# Patient Record
Sex: FEMALE | HISPANIC OR LATINO | Employment: FULL TIME | URBAN - METROPOLITAN AREA
[De-identification: names, ages, dates, MRNs, and addresses within clinical notes are randomized per-mention and may not be internally consistent; named-entity substitution may affect disease eponyms.]

---

## 2017-01-13 DIAGNOSIS — N91.1 SECONDARY AMENORRHEA: ICD-10-CM

## 2017-01-17 ENCOUNTER — HOSPITAL ENCOUNTER (OUTPATIENT)
Dept: LAB | Facility: MEDICAL CENTER | Age: 32
End: 2017-01-17
Attending: FAMILY MEDICINE
Payer: COMMERCIAL

## 2017-01-17 DIAGNOSIS — N91.1 SECONDARY AMENORRHEA: ICD-10-CM

## 2017-01-17 LAB
PROLACTIN SERPL-MCNC: 8.41 NG/ML (ref 2.8–26)
T4 FREE SERPL-MCNC: 0.65 NG/DL (ref 0.53–1.43)
TSH SERPL DL<=0.005 MIU/L-ACNC: 1.24 UIU/ML (ref 0.3–3.7)

## 2017-01-17 PROCEDURE — 36415 COLL VENOUS BLD VENIPUNCTURE: CPT

## 2017-01-17 PROCEDURE — 84146 ASSAY OF PROLACTIN: CPT

## 2017-01-17 PROCEDURE — 84443 ASSAY THYROID STIM HORMONE: CPT

## 2017-01-17 PROCEDURE — 84439 ASSAY OF FREE THYROXINE: CPT

## 2017-01-19 ENCOUNTER — PATIENT MESSAGE (OUTPATIENT)
Dept: MEDICAL GROUP | Age: 32
End: 2017-01-19

## 2017-01-27 ENCOUNTER — HOSPITAL ENCOUNTER (OUTPATIENT)
Dept: LAB | Facility: MEDICAL CENTER | Age: 32
End: 2017-01-27
Attending: CLINICAL NURSE SPECIALIST
Payer: COMMERCIAL

## 2017-01-27 LAB
25(OH)D3 SERPL-MCNC: 66 NG/ML (ref 30–100)
ESTRADIOL SERPL-MCNC: 27 PG/ML
FSH SERPL-ACNC: 4.5 MIU/ML
PROLACTIN SERPL-MCNC: 7.19 NG/ML (ref 2.8–26)

## 2017-01-27 PROCEDURE — 84146 ASSAY OF PROLACTIN: CPT

## 2017-01-27 PROCEDURE — 36415 COLL VENOUS BLD VENIPUNCTURE: CPT

## 2017-01-27 PROCEDURE — 84270 ASSAY OF SEX HORMONE GLOBUL: CPT

## 2017-01-27 PROCEDURE — 84403 ASSAY OF TOTAL TESTOSTERONE: CPT

## 2017-01-27 PROCEDURE — 82670 ASSAY OF TOTAL ESTRADIOL: CPT

## 2017-01-27 PROCEDURE — 82306 VITAMIN D 25 HYDROXY: CPT

## 2017-01-27 PROCEDURE — 83001 ASSAY OF GONADOTROPIN (FSH): CPT

## 2017-02-01 LAB
SHBG SERPL-SCNC: 147 NMOL/L (ref 30–135)
TESTOST FREE SERPL-MCNC: 1.6 PG/ML (ref 0.8–7.4)
TESTOST SERPL-MCNC: 27 NG/DL (ref 9–55)

## 2017-02-09 ENCOUNTER — HOSPITAL ENCOUNTER (OUTPATIENT)
Dept: LAB | Facility: MEDICAL CENTER | Age: 32
End: 2017-02-09
Attending: PHYSICIAN ASSISTANT
Payer: COMMERCIAL

## 2017-02-09 ENCOUNTER — HOSPITAL ENCOUNTER (OUTPATIENT)
Dept: LAB | Facility: MEDICAL CENTER | Age: 32
End: 2017-02-09
Payer: COMMERCIAL

## 2017-02-09 LAB
25(OH)D3 SERPL-MCNC: 90 NG/ML (ref 30–100)
ALBUMIN SERPL BCP-MCNC: 5.1 G/DL (ref 3.2–4.9)
ALBUMIN/GLOB SERPL: 1.5 G/DL
ALP SERPL-CCNC: 83 U/L (ref 30–99)
ALT SERPL-CCNC: 25 U/L (ref 2–50)
ANION GAP SERPL CALC-SCNC: 7 MMOL/L (ref 0–11.9)
APTT PPP: 27 SEC (ref 24.7–36)
AST SERPL-CCNC: 23 U/L (ref 12–45)
BASOPHILS # BLD AUTO: 0.02 K/UL (ref 0–0.12)
BASOPHILS NFR BLD AUTO: 0.4 % (ref 0–1.8)
BDY FAT % MEASURED: 15.7 %
BILIRUB SERPL-MCNC: 0.9 MG/DL (ref 0.1–1.5)
BP DIAS: 62 MMHG
BP SYS: 97 MMHG
BUN SERPL-MCNC: 12 MG/DL (ref 8–22)
CALCIUM SERPL-MCNC: 10.6 MG/DL (ref 8.5–10.5)
CHLORIDE SERPL-SCNC: 103 MMOL/L (ref 96–112)
CHOLEST SERPL-MCNC: 178 MG/DL (ref 100–199)
CHOLEST SERPL-MCNC: 181 MG/DL (ref 100–199)
CO2 SERPL-SCNC: 29 MMOL/L (ref 20–33)
CREAT SERPL-MCNC: 0.82 MG/DL (ref 0.5–1.4)
DIABETES HTDIA: NO
EOSINOPHIL # BLD: 0.01 K/UL (ref 0–0.51)
EOSINOPHIL NFR BLD AUTO: 0.2 % (ref 0–6.9)
ERYTHROCYTE [DISTWIDTH] IN BLOOD BY AUTOMATED COUNT: 45.6 FL (ref 35.9–50)
EVENT NAME HTEVT: NORMAL
FASTING HTFAS: YES
FOLATE SERPL-MCNC: >23.6 NG/ML
GLOBULIN SER CALC-MCNC: 3.4 G/DL (ref 1.9–3.5)
GLUCOSE SERPL-MCNC: 70 MG/DL (ref 65–99)
GLUCOSE SERPL-MCNC: 83 MG/DL (ref 65–99)
HCT VFR BLD AUTO: 44.5 % (ref 37–47)
HDLC SERPL-MCNC: 86 MG/DL
HDLC SERPL-MCNC: 89 MG/DL
HGB BLD-MCNC: 15.4 G/DL (ref 12–16)
HYPERTENSION HTHYP: NO
IMM GRANULOCYTES # BLD AUTO: 0.01 K/UL (ref 0–0.11)
IMM GRANULOCYTES NFR BLD AUTO: 0.2 % (ref 0–0.9)
INR PPP: 0.99 (ref 0.87–1.13)
LDLC SERPL CALC-MCNC: 77 MG/DL
LDLC SERPL CALC-MCNC: 77 MG/DL
LYMPHOCYTES # BLD: 1.63 K/UL (ref 1–4.8)
LYMPHOCYTES NFR BLD AUTO: 35.6 % (ref 22–41)
MCH RBC QN AUTO: 34 PG (ref 27–33)
MCHC RBC AUTO-ENTMCNC: 34.6 G/DL (ref 33.6–35)
MCV RBC AUTO: 98.2 FL (ref 81.4–97.8)
MONOCYTES # BLD: 0.28 K/UL (ref 0–0.85)
MONOCYTES NFR BLD AUTO: 6.1 % (ref 0–13.4)
NEUTROPHILS # BLD: 2.63 K/UL (ref 2–7.15)
NEUTROPHILS NFR BLD AUTO: 57.5 % (ref 44–72)
NRBC # BLD AUTO: 0 K/UL
NRBC BLD-RTO: 0 /100 WBC
PLATELET # BLD AUTO: 207 K/UL (ref 164–446)
PMV BLD AUTO: 11.6 FL (ref 9–12.9)
POTASSIUM SERPL-SCNC: 3.7 MMOL/L (ref 3.6–5.5)
PROT SERPL-MCNC: 8.5 G/DL (ref 6–8.2)
PROTHROMBIN TIME: 13.4 SEC (ref 12–14.6)
RBC # BLD AUTO: 4.53 M/UL (ref 4.2–5.4)
SCREENING LOC CITY HTCIT: NORMAL
SCREENING LOC STATE HTSTA: NORMAL
SCREENING LOCATION HTLOC: NORMAL
SMOKING HTSMO: NO
SODIUM SERPL-SCNC: 139 MMOL/L (ref 135–145)
SUBSCRIBER ID HTSID: NORMAL
TRIGL SERPL-MCNC: 73 MG/DL (ref 0–149)
TRIGL SERPL-MCNC: 74 MG/DL (ref 0–149)
VIT B12 SERPL-MCNC: 881 PG/ML (ref 211–911)
WBC # BLD AUTO: 4.6 K/UL (ref 4.8–10.8)

## 2017-02-09 PROCEDURE — 85025 COMPLETE CBC W/AUTO DIFF WBC: CPT

## 2017-02-09 PROCEDURE — 80061 LIPID PANEL: CPT

## 2017-02-09 PROCEDURE — 82746 ASSAY OF FOLIC ACID SERUM: CPT

## 2017-02-09 PROCEDURE — 85610 PROTHROMBIN TIME: CPT

## 2017-02-09 PROCEDURE — 82607 VITAMIN B-12: CPT

## 2017-02-09 PROCEDURE — 80053 COMPREHEN METABOLIC PANEL: CPT

## 2017-02-09 PROCEDURE — 82306 VITAMIN D 25 HYDROXY: CPT

## 2017-02-09 PROCEDURE — 85730 THROMBOPLASTIN TIME PARTIAL: CPT

## 2017-02-09 PROCEDURE — 36415 COLL VENOUS BLD VENIPUNCTURE: CPT

## 2017-03-24 ENCOUNTER — HOSPITAL ENCOUNTER (OUTPATIENT)
Dept: LAB | Facility: MEDICAL CENTER | Age: 32
End: 2017-03-24
Attending: PHYSICIAN ASSISTANT
Payer: COMMERCIAL

## 2017-03-24 LAB
ALBUMIN SERPL BCP-MCNC: 4.5 G/DL (ref 3.2–4.9)
ALBUMIN/GLOB SERPL: 1.3 G/DL
ALP SERPL-CCNC: 76 U/L (ref 30–99)
ALT SERPL-CCNC: 31 U/L (ref 2–50)
ANION GAP SERPL CALC-SCNC: 6 MMOL/L (ref 0–11.9)
AST SERPL-CCNC: 23 U/L (ref 12–45)
BILIRUB SERPL-MCNC: 0.5 MG/DL (ref 0.1–1.5)
BUN SERPL-MCNC: 14 MG/DL (ref 8–22)
CALCIUM SERPL-MCNC: 9.9 MG/DL (ref 8.5–10.5)
CHLORIDE SERPL-SCNC: 102 MMOL/L (ref 96–112)
CO2 SERPL-SCNC: 28 MMOL/L (ref 20–33)
CREAT SERPL-MCNC: 0.78 MG/DL (ref 0.5–1.4)
GLOBULIN SER CALC-MCNC: 3.5 G/DL (ref 1.9–3.5)
GLUCOSE SERPL-MCNC: 79 MG/DL (ref 65–99)
PHOSPHATE SERPL-MCNC: 3.4 MG/DL (ref 2.5–4.5)
POTASSIUM SERPL-SCNC: 4 MMOL/L (ref 3.6–5.5)
PROT SERPL-MCNC: 8 G/DL (ref 6–8.2)
PTH-INTACT SERPL-MCNC: 34.8 PG/ML (ref 14–72)
SODIUM SERPL-SCNC: 136 MMOL/L (ref 135–145)

## 2017-03-24 PROCEDURE — 80053 COMPREHEN METABOLIC PANEL: CPT

## 2017-03-24 PROCEDURE — 83970 ASSAY OF PARATHORMONE: CPT

## 2017-03-24 PROCEDURE — 36415 COLL VENOUS BLD VENIPUNCTURE: CPT

## 2017-03-24 PROCEDURE — 84100 ASSAY OF PHOSPHORUS: CPT

## 2017-03-29 ENCOUNTER — HOSPITAL ENCOUNTER (OUTPATIENT)
Dept: LAB | Facility: MEDICAL CENTER | Age: 32
End: 2017-03-29
Attending: OBSTETRICS & GYNECOLOGY
Payer: COMMERCIAL

## 2017-03-29 LAB
T3FREE SERPL-MCNC: 2.57 PG/ML (ref 2.4–4.2)
T4 FREE SERPL-MCNC: 0.76 NG/DL (ref 0.53–1.43)
TSH SERPL DL<=0.005 MIU/L-ACNC: 0.68 UIU/ML (ref 0.3–3.7)

## 2017-03-29 PROCEDURE — 84443 ASSAY THYROID STIM HORMONE: CPT

## 2017-03-29 PROCEDURE — 84439 ASSAY OF FREE THYROXINE: CPT

## 2017-03-29 PROCEDURE — 84481 FREE ASSAY (FT-3): CPT

## 2017-03-29 PROCEDURE — 36415 COLL VENOUS BLD VENIPUNCTURE: CPT

## 2017-05-25 ENCOUNTER — HOSPITAL ENCOUNTER (OUTPATIENT)
Dept: LAB | Facility: MEDICAL CENTER | Age: 32
End: 2017-05-25
Attending: PHYSICIAN ASSISTANT
Payer: COMMERCIAL

## 2017-05-25 LAB
GFR SERPL CREATININE-BSD FRML MDRD: >60 ML/MIN/1.73 M 2
TRANSFERRIN SERPL-MCNC: 250 MG/DL (ref 200–370)

## 2017-05-25 PROCEDURE — 80053 COMPREHEN METABOLIC PANEL: CPT

## 2017-05-25 PROCEDURE — 82607 VITAMIN B-12: CPT

## 2017-05-25 PROCEDURE — 85652 RBC SED RATE AUTOMATED: CPT

## 2017-05-25 PROCEDURE — 85025 COMPLETE CBC W/AUTO DIFF WBC: CPT

## 2017-05-25 PROCEDURE — 83516 IMMUNOASSAY NONANTIBODY: CPT | Mod: 91

## 2017-05-25 PROCEDURE — 82746 ASSAY OF FOLIC ACID SERUM: CPT

## 2017-05-25 PROCEDURE — 86140 C-REACTIVE PROTEIN: CPT

## 2017-05-25 PROCEDURE — 82728 ASSAY OF FERRITIN: CPT

## 2017-05-25 PROCEDURE — 86376 MICROSOMAL ANTIBODY EACH: CPT

## 2017-05-25 PROCEDURE — 84436 ASSAY OF TOTAL THYROXINE: CPT

## 2017-05-25 PROCEDURE — 84443 ASSAY THYROID STIM HORMONE: CPT

## 2017-05-25 PROCEDURE — 84466 ASSAY OF TRANSFERRIN: CPT

## 2017-05-25 PROCEDURE — 36415 COLL VENOUS BLD VENIPUNCTURE: CPT

## 2017-05-25 PROCEDURE — 86200 CCP ANTIBODY: CPT

## 2017-05-25 PROCEDURE — 83550 IRON BINDING TEST: CPT

## 2017-05-25 PROCEDURE — 86431 RHEUMATOID FACTOR QUANT: CPT

## 2017-05-25 PROCEDURE — 84480 ASSAY TRIIODOTHYRONINE (T3): CPT

## 2017-05-25 PROCEDURE — 86038 ANTINUCLEAR ANTIBODIES: CPT

## 2017-05-25 PROCEDURE — 83540 ASSAY OF IRON: CPT

## 2017-05-26 LAB
ALBUMIN SERPL BCP-MCNC: 4.7 G/DL (ref 3.2–4.9)
ALBUMIN/GLOB SERPL: 1.5 G/DL
ALP SERPL-CCNC: 77 U/L (ref 30–99)
ALT SERPL-CCNC: 29 U/L (ref 2–50)
ANION GAP SERPL CALC-SCNC: 8 MMOL/L (ref 0–11.9)
AST SERPL-CCNC: 27 U/L (ref 12–45)
BASOPHILS # BLD AUTO: 0.6 % (ref 0–1.8)
BASOPHILS # BLD: 0.04 K/UL (ref 0–0.12)
BILIRUB SERPL-MCNC: 0.5 MG/DL (ref 0.1–1.5)
BUN SERPL-MCNC: 16 MG/DL (ref 8–22)
CALCIUM SERPL-MCNC: 9.7 MG/DL (ref 8.5–10.5)
CHLORIDE SERPL-SCNC: 102 MMOL/L (ref 96–112)
CO2 SERPL-SCNC: 27 MMOL/L (ref 20–33)
CREAT SERPL-MCNC: 0.76 MG/DL (ref 0.5–1.4)
CRP SERPL HS-MCNC: 0.02 MG/DL (ref 0–0.75)
EOSINOPHIL # BLD AUTO: 0.01 K/UL (ref 0–0.51)
EOSINOPHIL NFR BLD: 0.2 % (ref 0–6.9)
ERYTHROCYTE [DISTWIDTH] IN BLOOD BY AUTOMATED COUNT: 42.5 FL (ref 35.9–50)
ERYTHROCYTE [SEDIMENTATION RATE] IN BLOOD BY WESTERGREN METHOD: 8 MM/HOUR (ref 0–20)
FERRITIN SERPL-MCNC: 68.9 NG/ML (ref 10–291)
FOLATE SERPL-MCNC: >23.7 NG/ML
GLOBULIN SER CALC-MCNC: 3.2 G/DL (ref 1.9–3.5)
GLUCOSE SERPL-MCNC: 91 MG/DL (ref 65–99)
HCT VFR BLD AUTO: 38 % (ref 37–47)
HGB BLD-MCNC: 13.5 G/DL (ref 12–16)
IMM GRANULOCYTES # BLD AUTO: 0.01 K/UL (ref 0–0.11)
IMM GRANULOCYTES NFR BLD AUTO: 0.2 % (ref 0–0.9)
IRON SATN MFR SERPL: 24 % (ref 15–55)
IRON SERPL-MCNC: 82 UG/DL (ref 40–170)
LYMPHOCYTES # BLD AUTO: 1.97 K/UL (ref 1–4.8)
LYMPHOCYTES NFR BLD: 31.5 % (ref 22–41)
MCH RBC QN AUTO: 34.4 PG (ref 27–33)
MCHC RBC AUTO-ENTMCNC: 35.5 G/DL (ref 33.6–35)
MCV RBC AUTO: 96.7 FL (ref 81.4–97.8)
MONOCYTES # BLD AUTO: 0.25 K/UL (ref 0–0.85)
MONOCYTES NFR BLD AUTO: 4 % (ref 0–13.4)
NEUTROPHILS # BLD AUTO: 3.97 K/UL (ref 2–7.15)
NEUTROPHILS NFR BLD: 63.5 % (ref 44–72)
NRBC # BLD AUTO: 0 K/UL
NRBC BLD AUTO-RTO: 0 /100 WBC
PLATELET # BLD AUTO: 188 K/UL (ref 164–446)
PMV BLD AUTO: 11.9 FL (ref 9–12.9)
POTASSIUM SERPL-SCNC: 3.7 MMOL/L (ref 3.6–5.5)
PROT SERPL-MCNC: 7.9 G/DL (ref 6–8.2)
RBC # BLD AUTO: 3.93 M/UL (ref 4.2–5.4)
RHEUMATOID FACT SER IA-ACNC: 10 IU/ML (ref 0–14)
SODIUM SERPL-SCNC: 137 MMOL/L (ref 135–145)
T3 SERPL-MCNC: 62.9 NG/DL (ref 60–181)
T4 SERPL-MCNC: 6.1 UG/DL (ref 4–12)
THYROPEROXIDASE AB SERPL-ACNC: 2.2 IU/ML (ref 0–9)
TIBC SERPL-MCNC: 344 UG/DL (ref 250–450)
TSH SERPL DL<=0.005 MIU/L-ACNC: 1.65 UIU/ML (ref 0.3–3.7)
VIT B12 SERPL-MCNC: 949 PG/ML (ref 211–911)
WBC # BLD AUTO: 6.3 K/UL (ref 4.8–10.8)

## 2017-05-27 LAB
TTG IGA SER IA-ACNC: 1 U/ML (ref 0–3)
TTG IGG SER IA-ACNC: 4 U/ML (ref 0–5)

## 2017-05-28 LAB
NUCLEAR IGG SER QL IA: DETECTED
NUCLEAR IGG TITR SER IF: ABNORMAL {TITER}

## 2017-06-01 ENCOUNTER — HOSPITAL ENCOUNTER (OUTPATIENT)
Dept: LAB | Facility: MEDICAL CENTER | Age: 32
End: 2017-06-01
Attending: PHYSICIAN ASSISTANT
Payer: COMMERCIAL

## 2017-06-01 LAB
BASOPHILS # BLD AUTO: 0.6 % (ref 0–1.8)
BASOPHILS # BLD: 0.04 K/UL (ref 0–0.12)
EOSINOPHIL # BLD AUTO: 0.02 K/UL (ref 0–0.51)
EOSINOPHIL NFR BLD: 0.3 % (ref 0–6.9)
ERYTHROCYTE [DISTWIDTH] IN BLOOD BY AUTOMATED COUNT: 41.3 FL (ref 35.9–50)
HCT VFR BLD AUTO: 38.6 % (ref 37–47)
HGB BLD-MCNC: 13.6 G/DL (ref 12–16)
HGB RETIC QN AUTO: 39.4 PG/CELL (ref 29–35)
IMM GRANULOCYTES # BLD AUTO: 0.01 K/UL (ref 0–0.11)
IMM GRANULOCYTES NFR BLD AUTO: 0.2 % (ref 0–0.9)
IMM RETICS NFR: 9.8 % (ref 9.3–17.4)
LYMPHOCYTES # BLD AUTO: 1.66 K/UL (ref 1–4.8)
LYMPHOCYTES NFR BLD: 26.2 % (ref 22–41)
MCH RBC QN AUTO: 33.7 PG (ref 27–33)
MCHC RBC AUTO-ENTMCNC: 35.2 G/DL (ref 33.6–35)
MCV RBC AUTO: 95.5 FL (ref 81.4–97.8)
MONOCYTES # BLD AUTO: 0.3 K/UL (ref 0–0.85)
MONOCYTES NFR BLD AUTO: 4.7 % (ref 0–13.4)
NEUTROPHILS # BLD AUTO: 4.31 K/UL (ref 2–7.15)
NEUTROPHILS NFR BLD: 68 % (ref 44–72)
NRBC # BLD AUTO: 0 K/UL
NRBC BLD AUTO-RTO: 0 /100 WBC
PLATELET # BLD AUTO: 197 K/UL (ref 164–446)
PMV BLD AUTO: 11 FL (ref 9–12.9)
RBC # BLD AUTO: 4.04 M/UL (ref 4.2–5.4)
RETICS # AUTO: 0.07 M/UL (ref 0.04–0.06)
RETICS/RBC NFR: 1.6 % (ref 0.8–2.1)
WBC # BLD AUTO: 6.3 K/UL (ref 4.8–10.8)

## 2017-06-01 PROCEDURE — 85046 RETICYTE/HGB CONCENTRATE: CPT

## 2017-06-01 PROCEDURE — 86038 ANTINUCLEAR ANTIBODIES: CPT

## 2017-06-01 PROCEDURE — 86039 ANTINUCLEAR ANTIBODIES (ANA): CPT

## 2017-06-01 PROCEDURE — 86225 DNA ANTIBODY NATIVE: CPT

## 2017-06-01 PROCEDURE — 86235 NUCLEAR ANTIGEN ANTIBODY: CPT | Mod: 91

## 2017-06-01 PROCEDURE — 36415 COLL VENOUS BLD VENIPUNCTURE: CPT

## 2017-06-01 PROCEDURE — 85025 COMPLETE CBC W/AUTO DIFF WBC: CPT

## 2017-06-05 LAB
NUCLEAR IGG SER QL IA: DETECTED
NUCLEAR IGG TITR SER IF: ABNORMAL {TITER}

## 2017-06-07 LAB — TEST NAME 95000: NORMAL

## 2017-06-23 ENCOUNTER — OFFICE VISIT (OUTPATIENT)
Dept: CARDIOLOGY | Facility: MEDICAL CENTER | Age: 32
End: 2017-06-23
Payer: COMMERCIAL

## 2017-06-23 VITALS
HEART RATE: 48 BPM | OXYGEN SATURATION: 96 % | HEIGHT: 66 IN | WEIGHT: 109 LBS | SYSTOLIC BLOOD PRESSURE: 106 MMHG | BODY MASS INDEX: 17.52 KG/M2 | DIASTOLIC BLOOD PRESSURE: 70 MMHG

## 2017-06-23 DIAGNOSIS — R42 EPISODIC LIGHTHEADEDNESS: ICD-10-CM

## 2017-06-23 DIAGNOSIS — R00.1 BRADYCARDIA: ICD-10-CM

## 2017-06-23 LAB — EKG IMPRESSION: NORMAL

## 2017-06-23 PROCEDURE — 99244 OFF/OP CNSLTJ NEW/EST MOD 40: CPT | Performed by: INTERNAL MEDICINE

## 2017-06-23 PROCEDURE — 93000 ELECTROCARDIOGRAM COMPLETE: CPT | Performed by: INTERNAL MEDICINE

## 2017-06-23 ASSESSMENT — ENCOUNTER SYMPTOMS: PALPITATIONS: 1

## 2017-06-23 NOTE — Clinical Note
[facility]   [address]  [place] [address]  Phone: [phone]  Fax: [phone]              Emili Awad  1985    Encounter Date: 6/23/2017    Qi aHssan M.D.          PROGRESS NOTE:  Subjective:   Emili Awad is a 32 y.o. female who presents today for cardiac evaluation of palpitation and lightheadedness. These usually happen after her workout sessions. No syncopal episodes. Emili Awad does not have any history of heart attack arrhythmias in the past. she never had transthoracic echocardiogram, cardiac catheterization or ablations procedure in the past. At this time, she denies having chest pain shortness of breath presyncopal syncopal episodes. she is able to exercise with walking for one to 2 miles without having problems of chest pain or shortness of breath. Patient is also able to climb up at least 2 flights of stairs without having symptoms.      Past Medical History   Diagnosis Date   • H. pylori infection 2015     treated   • Hypokalemia    • IUD (intrauterine device) in place    • Needle stick injury      took medications, pt was tested negative   • Eczema    • Episodic lightheadedness 6/23/2017     History reviewed. No pertinent past surgical history.  Family History   Problem Relation Age of Onset   • Diabetes Mother    • Hypertension Father    • Cancer Paternal Uncle      lymphoma     History   Smoking status   • Never Smoker    Smokeless tobacco   • Never Used     No Known Allergies  Outpatient Encounter Prescriptions as of 6/23/2017   Medication Sig Dispense Refill   • B Complex Vitamins (VITAMIN B COMPLEX PO) Take  by mouth.     • BIOTIN PO Take  by mouth.     • Ascorbic Acid (VITAMIN C PO) Take  by mouth.     • Cholecalciferol (VITAMIN D PO) Take 1 Tab by mouth every day.     • Omega-3 Fatty Acids (FISH OIL PO) Take 1 Cap by mouth every day.     • Multiple Vitamins-Minerals (MULTIVITAMIN PO) Take  by mouth.    "  • Probiotic Product (PROBIOTIC DAILY PO) Take  by mouth.     • diazepam (VALIUM) 2 MG Tab Take 1 Tab by mouth 1 time daily as needed for Anxiety. (Patient not taking: Reported on 12/13/2016) 5 Tab 0   • POTASSIUM CHLORIDE PO Take  by mouth.       No facility-administered encounter medications on file as of 6/23/2017.     Review of Systems   Cardiovascular: Positive for palpitations.        Objective:   /70 mmHg  Pulse 48  Ht 1.676 m (5' 6\")  Wt 49.442 kg (109 lb)  BMI 17.60 kg/m2  SpO2 96%    Physical Exam   Constitutional: She is oriented to person, place, and time. She appears well-developed and well-nourished.   HENT:   Head: Normocephalic and atraumatic.   Eyes: EOM are normal.   Neck: No JVD present.   Cardiovascular: Normal rate, regular rhythm, normal heart sounds and intact distal pulses.  Exam reveals no gallop and no friction rub.    No murmur heard.  Pulmonary/Chest: No respiratory distress. She has no wheezes. She has no rales. She exhibits no tenderness.   Abdominal: She exhibits no distension. There is no tenderness. There is no rebound and no guarding.   Musculoskeletal: She exhibits no edema or tenderness.   Lymphadenopathy:     She has no cervical adenopathy.   Neurological: She is alert and oriented to person, place, and time.   Skin: Skin is dry.   Psychiatric: She has a normal mood and affect.   Nursing note and vitals reviewed.      Assessment:     1. Bradycardia  EKG    ECHOCARDIOGRAM COMP W/O CONT    HOLTER MONITOR STUDY    TREADMILL STRESS   2. Episodic lightheadedness  ECHOCARDIOGRAM COMP W/O CONT    HOLTER MONITOR STUDY    TREADMILL STRESS       Medical Decision Making:  Today's Assessment / Status / Plan:      I will order transthoracic echocardiogram to assess for structural abnormalities.   I will also order 48 hours home Holter monitoring.   I will also order exercise treadmill stress test        Paige Cruz PA-C  [address]  [place] [address]  VIA " Facsimile: [phone]

## 2017-06-23 NOTE — PROGRESS NOTES
Subjective:   Emili Awad is a 32 y.o. female who presents today for cardiac evaluation of palpitation and lightheadedness. These usually happen after her workout sessions. No syncopal episodes. Emili Awad does not have any history of heart attack arrhythmias in the past. she never had transthoracic echocardiogram, cardiac catheterization or ablations procedure in the past. At this time, she denies having chest pain shortness of breath presyncopal syncopal episodes. she is able to exercise with walking for one to 2 miles without having problems of chest pain or shortness of breath. Patient is also able to climb up at least 2 flights of stairs without having symptoms.      Past Medical History   Diagnosis Date   • H. pylori infection 2015     treated   • Hypokalemia    • IUD (intrauterine device) in place    • Needle stick injury      took medications, pt was tested negative   • Eczema    • Episodic lightheadedness 6/23/2017     History reviewed. No pertinent past surgical history.  Family History   Problem Relation Age of Onset   • Diabetes Mother    • Hypertension Father    • Cancer Paternal Uncle      lymphoma     History   Smoking status   • Never Smoker    Smokeless tobacco   • Never Used     No Known Allergies  Outpatient Encounter Prescriptions as of 6/23/2017   Medication Sig Dispense Refill   • B Complex Vitamins (VITAMIN B COMPLEX PO) Take  by mouth.     • BIOTIN PO Take  by mouth.     • Ascorbic Acid (VITAMIN C PO) Take  by mouth.     • Cholecalciferol (VITAMIN D PO) Take 1 Tab by mouth every day.     • Omega-3 Fatty Acids (FISH OIL PO) Take 1 Cap by mouth every day.     • Multiple Vitamins-Minerals (MULTIVITAMIN PO) Take  by mouth.     • Probiotic Product (PROBIOTIC DAILY PO) Take  by mouth.     • diazepam (VALIUM) 2 MG Tab Take 1 Tab by mouth 1 time daily as needed for Anxiety. (Patient not taking: Reported on 12/13/2016) 5 Tab 0   • POTASSIUM CHLORIDE PO Take  by mouth.       No  "facility-administered encounter medications on file as of 6/23/2017.     Review of Systems   Cardiovascular: Positive for palpitations.        Objective:   /70 mmHg  Pulse 48  Ht 1.676 m (5' 6\")  Wt 49.442 kg (109 lb)  BMI 17.60 kg/m2  SpO2 96%    Physical Exam   Constitutional: She is oriented to person, place, and time. She appears well-developed and well-nourished.   HENT:   Head: Normocephalic and atraumatic.   Eyes: EOM are normal.   Neck: No JVD present.   Cardiovascular: Normal rate, regular rhythm, normal heart sounds and intact distal pulses.  Exam reveals no gallop and no friction rub.    No murmur heard.  Pulmonary/Chest: No respiratory distress. She has no wheezes. She has no rales. She exhibits no tenderness.   Abdominal: She exhibits no distension. There is no tenderness. There is no rebound and no guarding.   Musculoskeletal: She exhibits no edema or tenderness.   Lymphadenopathy:     She has no cervical adenopathy.   Neurological: She is alert and oriented to person, place, and time.   Skin: Skin is dry.   Psychiatric: She has a normal mood and affect.   Nursing note and vitals reviewed.      Assessment:     1. Bradycardia  EKG    ECHOCARDIOGRAM COMP W/O CONT    HOLTER MONITOR STUDY    TREADMILL STRESS   2. Episodic lightheadedness  ECHOCARDIOGRAM COMP W/O CONT    HOLTER MONITOR STUDY    TREADMILL STRESS       Medical Decision Making:  Today's Assessment / Status / Plan:      I will order transthoracic echocardiogram to assess for structural abnormalities.   I will also order 48 hours home Holter monitoring.   I will also order exercise treadmill stress test    "

## 2017-06-23 NOTE — MR AVS SNAPSHOT
"        Emili Awad   2017 2:00 PM   Office Visit   MRN: [id number]    Department:  [facility]   Dept Phone:  [phone]    Description:  Female : 1985   Provider:  Qi Hassan M.D.           Reason for Visit     New Patient           Allergies as of 2017     No Known Allergies      You were diagnosed with     Bradycardia   [908662]       Episodic lightheadedness   [938917]         Vital Signs     Blood Pressure Pulse Height Weight Body Mass Index Oxygen Saturation    106/70 mmHg 48 1.676 m (5' 6\") 49.442 kg (109 lb) 17.60 kg/m2 96%    Smoking Status                   Never Smoker            Basic Information     Date Of Birth Sex Race Ethnicity Preferred Language    1985 Female  or   Origin (Ukrainian,Kuwaiti,Swazi,Tongan, etc) English      Your appointments     2017  2:15 PM   ECHO with ECHO [facility] [place] [place], [facility] EXAM 12   ECHOCARDIOLOGY [facility] [place] [place] ([facility])    [address]  [place] [address]   [phone]            2017  2:45 PM   Treadmill with TREADMILL[facility]   [facility] (--)    [address]  [place] [address]   [phone]            2017 11:00 AM   HOLTER MONITOR 48 HRS with HOLTER-[facility]   [facility] (--)    [address]  [place] [address]   [phone]            Aug 04, 2017  3:00 PM   FOLLOW UP with Qi Hassan M.D.   [facility] (--)    [address]  [place] [address]   [phone]              Problem List              ICD-10-CM Priority Class Noted - Resolved    Health examination of defined subpopulation Z02.89   2013 - Present    BMI less than 19,adult Z68.1   2016 - Present    Abdominal discomfort R10.9   2016 - Present    Abdominal bloating R14.0   2016 - Present    Irregular menses N92.6   2016 - Present    Secondary amenorrhea " N91.1   2017 - Present    Episodic lightheadedness R42   2017 - Present      Health Maintenance        Date Due Completion Dates    PAP SMEAR 2019    IMM DTaP/Tdap/Td Vaccine (2 - Td) 2026            Results     EKG      Component    Report    [facility]    Test Date:  2017  Pt Name:    LEELEE LOMELI                    Department: [facility]  MRN:        [id number]                      Room:  Gender:     F                            Technician: JOLIE  :        1985                   Requested By:QI BALDWIN  Order #:    [id number]                    Reading MD: Qi Baldwin MD    Measurements  Intervals                                Axis  Rate:       44                           P:          68  MN:         132                          QRS:        5  QRSD:       82                           T:          65  QT:         460  QTc:        394    Interpretive Statements  SINUS BRADYCARDIA  BORDERLINE T ABNORMALITIES, ANT-LAT LEADS  No previous ECG available for comparison    Electronically Signed On 2017 14:44:27 PDT by Qi Baldwin MD                          Current Immunizations     Hepatitis A Vaccine, Adult 10/15/2013    Hepatitis B Vaccine Recombivax (Adol/Adult) 2011, 2011, 2011    Influenza Vaccine Quad Inj (Pf) 11/10/2016, 10/4/2014    Influenza Vaccine Quad Inj (Preserved) 10/20/2015    MMR Vaccine 1992, 1989    Tdap Vaccine 2016  3:20 PM    Tuberculin Skin Test 2013 10:10 AM      Below and/or attached are the medications your provider expects you to take. Review all of your home medications and newly ordered medications with your provider and/or pharmacist. Follow medication instructions as directed by your provider and/or pharmacist. Please keep your medication list with you and share with your provider. Update the information when medications are discontinued, doses are changed, or new  medications (including over-the-counter products) are added; and carry medication information at all times in the event of emergency situations     Allergies:  No Known Allergies          Medications  Valid as of: June 23, 2017 -  2:47 PM    Generic Name Brand Name Tablet Size Instructions for use    Ascorbic Acid   Take  by mouth.        B Complex Vitamins   Take  by mouth.        Biotin   Take  by mouth.        Cholecalciferol   Take 1 Tab by mouth every day.        DiazePAM (Tab) VALIUM 2 MG Take 1 Tab by mouth 1 time daily as needed for Anxiety.        Multiple Vitamins-Minerals   Take  by mouth.        Omega-3 Fatty Acids   Take 1 Cap by mouth every day.        Potassium Chloride   Take  by mouth.        Probiotic Product   Take  by mouth.        .                 Medicines prescribed today were sent to:     [facility] - [place], [place] - [address] [address] [facility]    [address] [address]    Phone: [phone] Fax: [phone]    Open 24 Hours?: No      Medication refill instructions:       If your prescription bottle indicates you have medication refills left, it is not necessary to call your provider’s office. Please contact your pharmacy and they will refill your medication.    If your prescription bottle indicates you do not have any refills left, you may request refills at any time through one of the following ways: The online [employer] system (except Urgent Care), by calling your provider’s office, or by asking your pharmacy to contact your provider’s office with a refill request. Medication refills are processed only during regular business hours and may not be available until the next business day. Your provider may request additional information or to have a follow-up visit with you prior to refilling your medication.   *Please Note: Medication refills are assigned a new Rx number when refilled electronically. Your pharmacy may indicate that no refills were authorized even though a new prescription  for the same medication is available at the pharmacy. Please request the medicine by name with the pharmacy before contacting your provider for a refill.        Your To Do List     Future Labs/Procedures Complete By Expires    ECHOCARDIOGRAM COMP W/O CONT  As directed 6/24/2018         MyChart Access Code: Activation code not generated  Current [employer] Status: Active

## 2017-06-27 ENCOUNTER — HOSPITAL ENCOUNTER (OUTPATIENT)
Facility: MEDICAL CENTER | Age: 32
End: 2017-06-27
Attending: PHYSICIAN ASSISTANT
Payer: COMMERCIAL

## 2017-06-27 PROCEDURE — 87899 AGENT NOS ASSAY W/OPTIC: CPT

## 2017-06-27 PROCEDURE — 87324 CLOSTRIDIUM AG IA: CPT

## 2017-06-27 PROCEDURE — 87328 CRYPTOSPORIDIUM AG IA: CPT

## 2017-06-27 PROCEDURE — 87046 STOOL CULTR AEROBIC BACT EA: CPT

## 2017-06-27 PROCEDURE — 87329 GIARDIA AG IA: CPT

## 2017-06-27 PROCEDURE — 87045 FECES CULTURE AEROBIC BACT: CPT

## 2017-06-27 PROCEDURE — 82274 ASSAY TEST FOR BLOOD FECAL: CPT

## 2017-06-27 PROCEDURE — 87493 C DIFF AMPLIFIED PROBE: CPT

## 2017-06-28 LAB
C DIFF DNA SPEC QL NAA+PROBE: NEGATIVE
C DIFF TOX A+B STL QL IA: NEGATIVE
C DIFF TOX GENS STL QL NAA+PROBE: NEGATIVE
G LAMBLIA+C PARVUM AG STL QL RAPID: NORMAL
SIGNIFICANT IND 70042: NORMAL
SOURCE SOURCE: NORMAL

## 2017-06-29 LAB
E COLI SXT1+2 STL IA: NORMAL
SIGNIFICANT IND 70042: NORMAL
SOURCE SOURCE: NORMAL

## 2017-06-30 LAB — HEMOCCULT STL QL IA: NEGATIVE

## 2017-07-01 LAB
BACTERIA STL CULT: NORMAL
E COLI SXT1+2 STL IA: NORMAL
SIGNIFICANT IND 70042: NORMAL
SOURCE SOURCE: NORMAL

## 2017-07-10 ENCOUNTER — HOSPITAL ENCOUNTER (OUTPATIENT)
Dept: LAB | Facility: MEDICAL CENTER | Age: 32
End: 2017-07-10
Attending: PHYSICIAN ASSISTANT
Payer: COMMERCIAL

## 2017-07-10 LAB
ALBUMIN SERPL BCP-MCNC: 4.3 G/DL (ref 3.2–4.9)
ALBUMIN/GLOB SERPL: 1.5 G/DL
ALP SERPL-CCNC: 84 U/L (ref 30–99)
ALT SERPL-CCNC: 24 U/L (ref 2–50)
ANION GAP SERPL CALC-SCNC: 7 MMOL/L (ref 0–11.9)
AST SERPL-CCNC: 22 U/L (ref 12–45)
BASOPHILS # BLD AUTO: 0.7 % (ref 0–1.8)
BASOPHILS # BLD: 0.04 K/UL (ref 0–0.12)
BILIRUB SERPL-MCNC: 0.3 MG/DL (ref 0.1–1.5)
BUN SERPL-MCNC: 18 MG/DL (ref 8–22)
CALCIUM SERPL-MCNC: 9.3 MG/DL (ref 8.5–10.5)
CHLORIDE SERPL-SCNC: 100 MMOL/L (ref 96–112)
CO2 SERPL-SCNC: 29 MMOL/L (ref 20–33)
CREAT SERPL-MCNC: 0.73 MG/DL (ref 0.5–1.4)
CRP SERPL HS-MCNC: 0.03 MG/DL (ref 0–0.75)
EOSINOPHIL # BLD AUTO: 0.02 K/UL (ref 0–0.51)
EOSINOPHIL NFR BLD: 0.4 % (ref 0–6.9)
ERYTHROCYTE [DISTWIDTH] IN BLOOD BY AUTOMATED COUNT: 43.8 FL (ref 35.9–50)
ERYTHROCYTE [SEDIMENTATION RATE] IN BLOOD BY WESTERGREN METHOD: 4 MM/HOUR (ref 0–20)
FERRITIN SERPL-MCNC: 41.5 NG/ML (ref 10–291)
FOLATE SERPL-MCNC: >23.7 NG/ML
GFR SERPL CREATININE-BSD FRML MDRD: >60 ML/MIN/1.73 M 2
GLOBULIN SER CALC-MCNC: 2.9 G/DL (ref 1.9–3.5)
GLUCOSE SERPL-MCNC: 63 MG/DL (ref 65–99)
HCT VFR BLD AUTO: 38.6 % (ref 37–47)
HGB BLD-MCNC: 13.6 G/DL (ref 12–16)
HGB RETIC QN AUTO: 38.8 PG/CELL (ref 29–35)
IMM GRANULOCYTES # BLD AUTO: 0.01 K/UL (ref 0–0.11)
IMM GRANULOCYTES NFR BLD AUTO: 0.2 % (ref 0–0.9)
IMM RETICS NFR: 9.3 % (ref 9.3–17.4)
IRON SATN MFR SERPL: 18 % (ref 15–55)
IRON SERPL-MCNC: 60 UG/DL (ref 40–170)
LYMPHOCYTES # BLD AUTO: 1.79 K/UL (ref 1–4.8)
LYMPHOCYTES NFR BLD: 33.1 % (ref 22–41)
MCH RBC QN AUTO: 34 PG (ref 27–33)
MCHC RBC AUTO-ENTMCNC: 35.2 G/DL (ref 33.6–35)
MCV RBC AUTO: 96.5 FL (ref 81.4–97.8)
MONOCYTES # BLD AUTO: 0.38 K/UL (ref 0–0.85)
MONOCYTES NFR BLD AUTO: 7 % (ref 0–13.4)
NEUTROPHILS # BLD AUTO: 3.17 K/UL (ref 2–7.15)
NEUTROPHILS NFR BLD: 58.6 % (ref 44–72)
NRBC # BLD AUTO: 0 K/UL
NRBC BLD AUTO-RTO: 0 /100 WBC
PLATELET # BLD AUTO: 182 K/UL (ref 164–446)
PMV BLD AUTO: 11.9 FL (ref 9–12.9)
POTASSIUM SERPL-SCNC: 3.9 MMOL/L (ref 3.6–5.5)
PROT SERPL-MCNC: 7.2 G/DL (ref 6–8.2)
RBC # BLD AUTO: 4 M/UL (ref 4.2–5.4)
RETICS # AUTO: 0.07 M/UL (ref 0.04–0.06)
RETICS/RBC NFR: 1.9 % (ref 0.8–2.1)
SODIUM SERPL-SCNC: 136 MMOL/L (ref 135–145)
TIBC SERPL-MCNC: 335 UG/DL (ref 250–450)
TRANSFERRIN SERPL-MCNC: 243 MG/DL (ref 200–370)
TSH SERPL DL<=0.005 MIU/L-ACNC: 0.96 UIU/ML (ref 0.3–3.7)
VIT B12 SERPL-MCNC: 911 PG/ML (ref 211–911)
WBC # BLD AUTO: 5.4 K/UL (ref 4.8–10.8)

## 2017-07-10 PROCEDURE — 85046 RETICYTE/HGB CONCENTRATE: CPT

## 2017-07-10 PROCEDURE — 84443 ASSAY THYROID STIM HORMONE: CPT

## 2017-07-10 PROCEDURE — 80053 COMPREHEN METABOLIC PANEL: CPT

## 2017-07-10 PROCEDURE — 84466 ASSAY OF TRANSFERRIN: CPT

## 2017-07-10 PROCEDURE — 86235 NUCLEAR ANTIGEN ANTIBODY: CPT | Mod: 91

## 2017-07-10 PROCEDURE — 82607 VITAMIN B-12: CPT

## 2017-07-10 PROCEDURE — 85025 COMPLETE CBC W/AUTO DIFF WBC: CPT

## 2017-07-10 PROCEDURE — 82746 ASSAY OF FOLIC ACID SERUM: CPT

## 2017-07-10 PROCEDURE — 86140 C-REACTIVE PROTEIN: CPT

## 2017-07-10 PROCEDURE — 83540 ASSAY OF IRON: CPT

## 2017-07-10 PROCEDURE — 83550 IRON BINDING TEST: CPT

## 2017-07-10 PROCEDURE — 83516 IMMUNOASSAY NONANTIBODY: CPT

## 2017-07-10 PROCEDURE — 86225 DNA ANTIBODY NATIVE: CPT

## 2017-07-10 PROCEDURE — 36415 COLL VENOUS BLD VENIPUNCTURE: CPT

## 2017-07-10 PROCEDURE — 85652 RBC SED RATE AUTOMATED: CPT

## 2017-07-10 PROCEDURE — 82728 ASSAY OF FERRITIN: CPT

## 2017-07-12 ENCOUNTER — HOSPITAL ENCOUNTER (OUTPATIENT)
Dept: CARDIOLOGY | Facility: MEDICAL CENTER | Age: 32
End: 2017-07-12
Attending: INTERNAL MEDICINE
Payer: COMMERCIAL

## 2017-07-12 DIAGNOSIS — R42 EPISODIC LIGHTHEADEDNESS: ICD-10-CM

## 2017-07-12 DIAGNOSIS — R00.1 BRADYCARDIA: ICD-10-CM

## 2017-07-12 LAB
DSDNA AB TITR SER CLIF: NORMAL {TITER}
ENA SM IGG SER-ACNC: 0 AU/ML (ref 0–40)
ENA SS-B IGG SER IA-ACNC: 0 AU/ML (ref 0–40)
LV EJECT FRACT  99904: 60
LV EJECT FRACT MOD 2C 99903: 58.78
LV EJECT FRACT MOD 4C 99902: 64.97
LV EJECT FRACT MOD BP 99901: 62.11
SMA IGG SER-ACNC: 16 UNITS (ref 0–19)
SSA52 R0ENA AB IGG Q0420: 4 AU/ML (ref 0–40)
SSA60 R0ENA AB IGG Q0419: 0 AU/ML (ref 0–40)
TTG IGA SER IA-ACNC: 1 U/ML (ref 0–3)
TTG IGG SER IA-ACNC: 3 U/ML (ref 0–5)
U1 SNRNP IGG SER QL: 0 AU/ML (ref 0–40)

## 2017-07-12 PROCEDURE — 93306 TTE W/DOPPLER COMPLETE: CPT

## 2017-07-12 PROCEDURE — 93306 TTE W/DOPPLER COMPLETE: CPT | Mod: 26 | Performed by: INTERNAL MEDICINE

## 2017-07-12 NOTE — PROGRESS NOTES
Quick Note:    Dear Mary Beth,    Can you please let Emili Awad know that result is ok and I will see patient as scheduled?    Thanks Erlin Clinton.    ______

## 2017-07-13 ENCOUNTER — NON-PROVIDER VISIT (OUTPATIENT)
Dept: CARDIOLOGY | Facility: MEDICAL CENTER | Age: 32
End: 2017-07-13
Attending: INTERNAL MEDICINE
Payer: COMMERCIAL

## 2017-07-13 ENCOUNTER — TELEPHONE (OUTPATIENT)
Dept: CARDIOLOGY | Facility: MEDICAL CENTER | Age: 32
End: 2017-07-13

## 2017-07-13 VITALS
WEIGHT: 112 LBS | BODY MASS INDEX: 18.66 KG/M2 | HEIGHT: 65 IN | SYSTOLIC BLOOD PRESSURE: 102 MMHG | HEART RATE: 54 BPM | DIASTOLIC BLOOD PRESSURE: 60 MMHG | OXYGEN SATURATION: 99 %

## 2017-07-13 DIAGNOSIS — R00.1 BRADYCARDIA: ICD-10-CM

## 2017-07-13 DIAGNOSIS — R42 EPISODIC LIGHTHEADEDNESS: ICD-10-CM

## 2017-07-13 LAB — TREADMILL STRESS: NORMAL

## 2017-07-13 PROCEDURE — 93015 CV STRESS TEST SUPVJ I&R: CPT | Performed by: INTERNAL MEDICINE

## 2017-07-13 NOTE — TELEPHONE ENCOUNTER
Pt notified of echo results per Dr. Hassan. She has a Treadmill stress test today 7/13. Pt appreciative of call.     ECHOCARDIOGRAM COMP W/O CONT   Status: Final result     Visible to patient:  Not Released     Dx:  Bradycardia; Episodic lightheadedness               Notes Recorded by Qi Hassan M.D. on 7/12/2017 at 4:38 PM  Dear Mary Beth,    Can you please let Emili Alice Awad know that result is ok and I will see patient as scheduled?    Thanks Erlin Clinton.

## 2017-07-14 ENCOUNTER — TELEPHONE (OUTPATIENT)
Dept: CARDIOLOGY | Facility: MEDICAL CENTER | Age: 32
End: 2017-07-14

## 2017-07-14 NOTE — TELEPHONE ENCOUNTER
L/m notifying pt of negative stress test per Dr. Hassan. Pt to call back if any questions.     TREADMILL STRESS   Status: Edited Result - FINAL     Visible to patient:  Not Released     Dx:  Bradycardia; Episodic lightheadedness               Notes Recorded by Qi Hassan M.D. on 7/13/2017 at 3:51 PM  Dear Mary Beth,  Can you please let Emili Alice Awad know that result is ok and I will see patient as scheduled?  Thanks Erlin Clinton.

## 2017-07-18 ENCOUNTER — NON-PROVIDER VISIT (OUTPATIENT)
Dept: CARDIOLOGY | Facility: MEDICAL CENTER | Age: 32
End: 2017-07-18
Payer: COMMERCIAL

## 2017-07-18 DIAGNOSIS — I49.1 PREMATURE ATRIAL CONTRACTION: ICD-10-CM

## 2017-07-18 DIAGNOSIS — R42 EPISODIC LIGHTHEADEDNESS: ICD-10-CM

## 2017-07-18 DIAGNOSIS — R00.1 SINUS BRADYCARDIA: ICD-10-CM

## 2017-07-18 DIAGNOSIS — I49.3 PVC (PREMATURE VENTRICULAR CONTRACTION): ICD-10-CM

## 2017-07-18 DIAGNOSIS — I49.8 BIGEMINY: ICD-10-CM

## 2017-07-21 DIAGNOSIS — R00.1 BRADYCARDIA: ICD-10-CM

## 2017-07-21 DIAGNOSIS — R42 EPISODIC LIGHTHEADEDNESS: ICD-10-CM

## 2017-07-21 LAB — EKG IMPRESSION: NORMAL

## 2017-07-21 PROCEDURE — 93224 XTRNL ECG REC UP TO 48 HRS: CPT | Performed by: INTERNAL MEDICINE

## 2017-07-27 ENCOUNTER — HOSPITAL ENCOUNTER (OUTPATIENT)
Dept: RADIOLOGY | Facility: MEDICAL CENTER | Age: 32
End: 2017-07-27
Attending: INTERNAL MEDICINE
Payer: COMMERCIAL

## 2017-07-27 DIAGNOSIS — R10.9 ABDOMINAL PAIN, UNSPECIFIED SITE: ICD-10-CM

## 2017-07-27 PROCEDURE — 76700 US EXAM ABDOM COMPLETE: CPT

## 2017-08-04 ENCOUNTER — OFFICE VISIT (OUTPATIENT)
Dept: CARDIOLOGY | Facility: MEDICAL CENTER | Age: 32
End: 2017-08-04
Payer: COMMERCIAL

## 2017-08-04 VITALS
OXYGEN SATURATION: 98 % | HEART RATE: 46 BPM | SYSTOLIC BLOOD PRESSURE: 108 MMHG | BODY MASS INDEX: 18.66 KG/M2 | WEIGHT: 112 LBS | HEIGHT: 65 IN | DIASTOLIC BLOOD PRESSURE: 64 MMHG

## 2017-08-04 DIAGNOSIS — I49.3 PREMATURE VENTRICULAR COMPLEX: ICD-10-CM

## 2017-08-04 PROCEDURE — 99214 OFFICE O/P EST MOD 30 MIN: CPT | Performed by: INTERNAL MEDICINE

## 2017-08-04 RX ORDER — FERROUS SULFATE 325(65) MG
325 TABLET ORAL DAILY
COMMUNITY
End: 2018-03-15

## 2017-08-04 RX ORDER — DILTIAZEM HYDROCHLORIDE 120 MG/1
120 CAPSULE, COATED, EXTENDED RELEASE ORAL DAILY
Qty: 30 CAP | Refills: 11 | Status: SHIPPED | OUTPATIENT
Start: 2017-08-04 | End: 2018-03-15

## 2017-08-04 ASSESSMENT — ENCOUNTER SYMPTOMS
DIZZINESS: 0
VOMITING: 0
BLURRED VISION: 0
SPEECH CHANGE: 0
LOSS OF CONSCIOUSNESS: 0
EYE PAIN: 0
BLOOD IN STOOL: 0
CLAUDICATION: 0
HALLUCINATIONS: 0
EYE DISCHARGE: 0
SENSORY CHANGE: 0
CHILLS: 0
WEIGHT LOSS: 0
SHORTNESS OF BREATH: 0
MYALGIAS: 0
HEADACHES: 0
BRUISES/BLEEDS EASILY: 0
ABDOMINAL PAIN: 0
ORTHOPNEA: 0
PND: 0
DEPRESSION: 0
COUGH: 0
FEVER: 0
NAUSEA: 0
DOUBLE VISION: 0
FALLS: 0
PALPITATIONS: 1

## 2017-08-04 NOTE — MR AVS SNAPSHOT
"        Emili Awad   2017 3:00 PM   Office Visit   MRN: [id number]    Department:  [facility]   Dept Phone:  [phone]    Description:  Female : 1985   Provider:  Qi Hassan M.D.           Reason for Visit     Follow-Up           Allergies as of 2017     No Known Allergies      You were diagnosed with     Premature ventricular complex   [[id number]]         Vital Signs     Blood Pressure Pulse Height Weight Body Mass Index Oxygen Saturation    108/64 mmHg 46 1.651 m (5' 5\") 50.803 kg (112 lb) 18.64 kg/m2 98%    Smoking Status                   Never Smoker            Basic Information     Date Of Birth Sex Race Ethnicity Preferred Language    1985 Female  or   Origin (Romansh,Croatian,Equatorial Guinean,Mason, etc) English      Your appointments     Sep 15, 2017  2:30 PM   FOLLOW UP with Qi Hassan M.D.   [facility] (--)    [address], [address]  [facility] [address]   [phone]              Problem List              ICD-10-CM Priority Class Noted - Resolved    Health examination of defined subpopulation Z02.89   2013 - Present    BMI less than 19,adult Z68.1   2016 - Present    Abdominal discomfort R10.9   2016 - Present    Abdominal bloating R14.0   2016 - Present    Irregular menses N92.6   2016 - Present    Secondary amenorrhea N91.1   2017 - Present    Episodic lightheadedness R42   2017 - Present      Health Maintenance        Date Due Completion Dates    IMM INFLUENZA (1) 2017 11/10/2016, 10/20/2015, 10/4/2014    PAP SMEAR 2019    IMM DTaP/Tdap/Td Vaccine (2 - Td) 2026            Current Immunizations     Hepatitis A Vaccine, Adult 10/15/2013    Hepatitis B Vaccine Recombivax (Adol/Adult) 2011, 2011, 2011    Influenza Vaccine Quad Inj (Pf) 11/10/2016, 10/4/2014    Influenza Vaccine Quad Inj (Preserved) 10/20/2015   " MMR Vaccine 4/27/1992, 1/4/1989    Tdap Vaccine 12/13/2016  3:20 PM    Tuberculin Skin Test 11/1/2013 10:10 AM      Below and/or attached are the medications your provider expects you to take. Review all of your home medications and newly ordered medications with your provider and/or pharmacist. Follow medication instructions as directed by your provider and/or pharmacist. Please keep your medication list with you and share with your provider. Update the information when medications are discontinued, doses are changed, or new medications (including over-the-counter products) are added; and carry medication information at all times in the event of emergency situations     Allergies:  No Known Allergies          Medications  Valid as of: August 04, 2017 -  3:29 PM    Generic Name Brand Name Tablet Size Instructions for use    Ascorbic Acid   Take  by mouth.        B Complex Vitamins   Take  by mouth.        Biotin   Take  by mouth.        Cholecalciferol   Take 1 Tab by mouth every day.        DiazePAM (Tab) VALIUM 2 MG Take 1 Tab by mouth 1 time daily as needed for Anxiety.        DilTIAZem HCl Coated Beads (CAPSULE SR 24 HR) CARDIZEM  MG Take 1 Cap by mouth every day.        Ferrous Sulfate (Tab) ferrous sulfate 325 (65 FE) MG Take 325 mg by mouth every day.        Multiple Vitamins-Minerals   Take  by mouth.        Omega-3 Fatty Acids   Take 1 Cap by mouth every day.        Potassium Chloride   Take  by mouth.        Probiotic Product   Take  by mouth.        .                 Medicines prescribed today were sent to:     [facility] [place] - [id number] [facility] [address]    Phone: [phone] Fax: [phone]    Open 24 Hours?: No      Medication refill instructions:       If your prescription bottle indicates you have medication refills left, it is not necessary to call your provider’s office. Please contact your pharmacy and they will refill your medication.    If your  prescription bottle indicates you do not have any refills left, you may request refills at any time through one of the following ways: The online [employer] system (except Urgent Care), by calling your provider’s office, or by asking your pharmacy to contact your provider’s office with a refill request. Medication refills are processed only during regular business hours and may not be available until the next business day. Your provider may request additional information or to have a follow-up visit with you prior to refilling your medication.   *Please Note: Medication refills are assigned a new Rx number when refilled electronically. Your pharmacy may indicate that no refills were authorized even though a new prescription for the same medication is available at the pharmacy. Please request the medicine by name with the pharmacy before contacting your provider for a refill.           [employer] Access Code: Activation code not generated  Current [employer] Status: Active

## 2017-08-04 NOTE — Clinical Note
[facility]   [address], [address]  [place] [address]  Phone: [phone]  Fax: [phone]              Emili Awad  1985    Encounter Date: 8/4/2017    Qi Hassan M.D.          PROGRESS NOTE:  Subjective:   Emili Awad is a 32 y.o. female who presents today for cardiac care of palpitations. She had Holter monitor shows evidence of frequent PVCs. Her transthoracic echocardiogram is relatively normal. She still has symptoms at this time.    Past Medical History   Diagnosis Date   • H. pylori infection 2015     treated   • Hypokalemia    • IUD (intrauterine device) in place    • Needle stick injury      took medications, pt was tested negative   • Eczema    • Episodic lightheadedness 6/23/2017     History reviewed. No pertinent past surgical history.  Family History   Problem Relation Age of Onset   • Diabetes Mother    • Hypertension Father    • Cancer Paternal Uncle      lymphoma     History   Smoking status   • Never Smoker    Smokeless tobacco   • Never Used     No Known Allergies  Outpatient Encounter Prescriptions as of 8/4/2017   Medication Sig Dispense Refill   • ferrous sulfate 325 (65 FE) MG tablet Take 325 mg by mouth every day.     • diltiazem CD (CARDIZEM CD) 120 MG CAPSULE SR 24 HR Take 1 Cap by mouth every day. 30 Cap 11   • B Complex Vitamins (VITAMIN B COMPLEX PO) Take  by mouth.     • BIOTIN PO Take  by mouth.     • Ascorbic Acid (VITAMIN C PO) Take  by mouth.     • Cholecalciferol (VITAMIN D PO) Take 1 Tab by mouth every day.     • Omega-3 Fatty Acids (FISH OIL PO) Take 1 Cap by mouth every day.     • Multiple Vitamins-Minerals (MULTIVITAMIN PO) Take  by mouth.     • Probiotic Product (PROBIOTIC DAILY PO) Take  by mouth.     • diazepam (VALIUM) 2 MG Tab Take 1 Tab by mouth 1 time daily as needed for Anxiety. (Patient not taking: Reported on 12/13/2016) 5 Tab 0   • POTASSIUM CHLORIDE PO Take  by mouth.       No  "facility-administered encounter medications on file as of 8/4/2017.     Review of Systems   Constitutional: Negative for fever, chills, weight loss and malaise/fatigue.   HENT: Negative for ear discharge, ear pain, hearing loss and nosebleeds.    Eyes: Negative for blurred vision, double vision, pain and discharge.   Respiratory: Negative for cough and shortness of breath.    Cardiovascular: Positive for palpitations. Negative for chest pain, orthopnea, claudication, leg swelling and PND.   Gastrointestinal: Negative for nausea, vomiting, abdominal pain, blood in stool and melena.   Genitourinary: Negative for dysuria and hematuria.   Musculoskeletal: Negative for myalgias, joint pain and falls.   Skin: Negative for itching and rash.   Neurological: Negative for dizziness, sensory change, speech change, loss of consciousness and headaches.   Endo/Heme/Allergies: Negative for environmental allergies. Does not bruise/bleed easily.   Psychiatric/Behavioral: Negative for depression, suicidal ideas and hallucinations.        Objective:   /64 mmHg  Pulse 46  Ht 1.651 m (5' 5\")  Wt 50.803 kg (112 lb)  BMI 18.64 kg/m2  SpO2 98%    Physical Exam   Constitutional: She is oriented to person, place, and time. She appears well-developed and well-nourished.   HENT:   Head: Normocephalic and atraumatic.   Eyes: EOM are normal.   Neck: No JVD present.   Cardiovascular: Normal rate, regular rhythm, normal heart sounds and intact distal pulses.  Exam reveals no gallop and no friction rub.    No murmur heard.  Pulmonary/Chest: No respiratory distress. She has no wheezes. She has no rales. She exhibits no tenderness.   Abdominal: She exhibits no distension. There is no tenderness. There is no rebound and no guarding.   Musculoskeletal: She exhibits no edema or tenderness.   Lymphadenopathy:     She has no cervical adenopathy.   Neurological: She is alert and oriented to person, place, and time.   Skin: Skin is dry.   "   Psychiatric: She has a normal mood and affect.   Nursing note and vitals reviewed.      Assessment:     1. Premature ventricular complex  diltiazem CD (CARDIZEM CD) 120 MG CAPSULE SR 24 HR       Medical Decision Making:  Today's Assessment / Status / Plan:     Trial of Cardizem.      Paige Cruz PA-C  [address]  [place] [address]  VIA Facsimile: [phone]

## 2017-08-05 NOTE — PROGRESS NOTES
Subjective:   Emili Awad is a 32 y.o. female who presents today for cardiac care of palpitations. She had Holter monitor shows evidence of frequent PVCs. Her transthoracic echocardiogram is relatively normal. She still has symptoms at this time.    Past Medical History   Diagnosis Date   • H. pylori infection 2015     treated   • Hypokalemia    • IUD (intrauterine device) in place    • Needle stick injury      took medications, pt was tested negative   • Eczema    • Episodic lightheadedness 6/23/2017     History reviewed. No pertinent past surgical history.  Family History   Problem Relation Age of Onset   • Diabetes Mother    • Hypertension Father    • Cancer Paternal Uncle      lymphoma     History   Smoking status   • Never Smoker    Smokeless tobacco   • Never Used     No Known Allergies  Outpatient Encounter Prescriptions as of 8/4/2017   Medication Sig Dispense Refill   • ferrous sulfate 325 (65 FE) MG tablet Take 325 mg by mouth every day.     • diltiazem CD (CARDIZEM CD) 120 MG CAPSULE SR 24 HR Take 1 Cap by mouth every day. 30 Cap 11   • B Complex Vitamins (VITAMIN B COMPLEX PO) Take  by mouth.     • BIOTIN PO Take  by mouth.     • Ascorbic Acid (VITAMIN C PO) Take  by mouth.     • Cholecalciferol (VITAMIN D PO) Take 1 Tab by mouth every day.     • Omega-3 Fatty Acids (FISH OIL PO) Take 1 Cap by mouth every day.     • Multiple Vitamins-Minerals (MULTIVITAMIN PO) Take  by mouth.     • Probiotic Product (PROBIOTIC DAILY PO) Take  by mouth.     • diazepam (VALIUM) 2 MG Tab Take 1 Tab by mouth 1 time daily as needed for Anxiety. (Patient not taking: Reported on 12/13/2016) 5 Tab 0   • POTASSIUM CHLORIDE PO Take  by mouth.       No facility-administered encounter medications on file as of 8/4/2017.     Review of Systems   Constitutional: Negative for fever, chills, weight loss and malaise/fatigue.   HENT: Negative for ear discharge, ear pain, hearing loss and nosebleeds.    Eyes: Negative for blurred  "vision, double vision, pain and discharge.   Respiratory: Negative for cough and shortness of breath.    Cardiovascular: Positive for palpitations. Negative for chest pain, orthopnea, claudication, leg swelling and PND.   Gastrointestinal: Negative for nausea, vomiting, abdominal pain, blood in stool and melena.   Genitourinary: Negative for dysuria and hematuria.   Musculoskeletal: Negative for myalgias, joint pain and falls.   Skin: Negative for itching and rash.   Neurological: Negative for dizziness, sensory change, speech change, loss of consciousness and headaches.   Endo/Heme/Allergies: Negative for environmental allergies. Does not bruise/bleed easily.   Psychiatric/Behavioral: Negative for depression, suicidal ideas and hallucinations.        Objective:   /64 mmHg  Pulse 46  Ht 1.651 m (5' 5\")  Wt 50.803 kg (112 lb)  BMI 18.64 kg/m2  SpO2 98%    Physical Exam   Constitutional: She is oriented to person, place, and time. She appears well-developed and well-nourished.   HENT:   Head: Normocephalic and atraumatic.   Eyes: EOM are normal.   Neck: No JVD present.   Cardiovascular: Normal rate, regular rhythm, normal heart sounds and intact distal pulses.  Exam reveals no gallop and no friction rub.    No murmur heard.  Pulmonary/Chest: No respiratory distress. She has no wheezes. She has no rales. She exhibits no tenderness.   Abdominal: She exhibits no distension. There is no tenderness. There is no rebound and no guarding.   Musculoskeletal: She exhibits no edema or tenderness.   Lymphadenopathy:     She has no cervical adenopathy.   Neurological: She is alert and oriented to person, place, and time.   Skin: Skin is dry.   Psychiatric: She has a normal mood and affect.   Nursing note and vitals reviewed.      Assessment:     1. Premature ventricular complex  diltiazem CD (CARDIZEM CD) 120 MG CAPSULE SR 24 HR       Medical Decision Making:  Today's Assessment / Status / Plan:     Trial of Cardizem.  "

## 2017-08-10 ENCOUNTER — TELEPHONE (OUTPATIENT)
Dept: CARDIOLOGY | Facility: MEDICAL CENTER | Age: 32
End: 2017-08-10

## 2017-08-10 NOTE — TELEPHONE ENCOUNTER
Question about medication   Received: Today       Jordinvanessavirgil Munroe RCELESTINE.       Phone Number: [phone]                     TT/Mary Beth     Pt takes diltiazem CD, wants to know with occasionally a glass of wine on weekends is that okay? Please advise [phone]              Returned patient call. Pt advised that ETOH can potentiate PVCs. Pt states she takes her Cardizem in the AM and wanted to know if she could drink a little wine in the evenings. Pt advised to about alcohol potentiation. Pt asks if caffeine does the same. This is confirmed. Pt states she will just try to stay away or limit her alcohol intake then.

## 2017-08-23 ENCOUNTER — RX ONLY (OUTPATIENT)
Age: 32
Setting detail: RX ONLY
End: 2017-08-23

## 2017-08-25 ENCOUNTER — HOSPITAL ENCOUNTER (OUTPATIENT)
Dept: LAB | Facility: MEDICAL CENTER | Age: 32
End: 2017-08-25
Attending: PHYSICIAN ASSISTANT
Payer: COMMERCIAL

## 2017-08-25 LAB
BASOPHILS # BLD AUTO: 0.8 % (ref 0–1.8)
BASOPHILS # BLD: 0.04 K/UL (ref 0–0.12)
EOSINOPHIL # BLD AUTO: 0.02 K/UL (ref 0–0.51)
EOSINOPHIL NFR BLD: 0.4 % (ref 0–6.9)
ERYTHROCYTE [DISTWIDTH] IN BLOOD BY AUTOMATED COUNT: 44.6 FL (ref 35.9–50)
FERRITIN SERPL-MCNC: 57.1 NG/ML (ref 10–291)
HCT VFR BLD AUTO: 34.9 % (ref 37–47)
HGB BLD-MCNC: 12.1 G/DL (ref 12–16)
IMM GRANULOCYTES # BLD AUTO: 0.01 K/UL (ref 0–0.11)
IMM GRANULOCYTES NFR BLD AUTO: 0.2 % (ref 0–0.9)
IRON SATN MFR SERPL: 22 % (ref 15–55)
IRON SERPL-MCNC: 71 UG/DL (ref 40–170)
LYMPHOCYTES # BLD AUTO: 1.54 K/UL (ref 1–4.8)
LYMPHOCYTES NFR BLD: 31.5 % (ref 22–41)
MCH RBC QN AUTO: 34 PG (ref 27–33)
MCHC RBC AUTO-ENTMCNC: 34.7 G/DL (ref 33.6–35)
MCV RBC AUTO: 98 FL (ref 81.4–97.8)
MONOCYTES # BLD AUTO: 0.55 K/UL (ref 0–0.85)
MONOCYTES NFR BLD AUTO: 11.2 % (ref 0–13.4)
NEUTROPHILS # BLD AUTO: 2.73 K/UL (ref 2–7.15)
NEUTROPHILS NFR BLD: 55.9 % (ref 44–72)
NRBC # BLD AUTO: 0 K/UL
NRBC BLD AUTO-RTO: 0 /100 WBC
PLATELET # BLD AUTO: 170 K/UL (ref 164–446)
PMV BLD AUTO: 11.7 FL (ref 9–12.9)
RBC # BLD AUTO: 3.56 M/UL (ref 4.2–5.4)
TIBC SERPL-MCNC: 329 UG/DL (ref 250–450)
TRANSFERRIN SERPL-MCNC: 231 MG/DL (ref 200–370)
WBC # BLD AUTO: 4.9 K/UL (ref 4.8–10.8)

## 2017-08-25 PROCEDURE — 36415 COLL VENOUS BLD VENIPUNCTURE: CPT

## 2017-08-25 PROCEDURE — 85025 COMPLETE CBC W/AUTO DIFF WBC: CPT

## 2017-08-25 PROCEDURE — 84466 ASSAY OF TRANSFERRIN: CPT

## 2017-08-25 PROCEDURE — 83550 IRON BINDING TEST: CPT

## 2017-08-25 PROCEDURE — 83540 ASSAY OF IRON: CPT

## 2017-08-25 PROCEDURE — 82728 ASSAY OF FERRITIN: CPT

## 2017-09-15 ENCOUNTER — OFFICE VISIT (OUTPATIENT)
Dept: CARDIOLOGY | Facility: MEDICAL CENTER | Age: 32
End: 2017-09-15
Payer: COMMERCIAL

## 2017-09-15 VITALS
BODY MASS INDEX: 18.32 KG/M2 | HEIGHT: 66 IN | WEIGHT: 114 LBS | SYSTOLIC BLOOD PRESSURE: 100 MMHG | HEART RATE: 46 BPM | DIASTOLIC BLOOD PRESSURE: 80 MMHG | OXYGEN SATURATION: 100 %

## 2017-09-15 DIAGNOSIS — I49.3 PREMATURE VENTRICULAR COMPLEX: ICD-10-CM

## 2017-09-15 PROCEDURE — 99214 OFFICE O/P EST MOD 30 MIN: CPT | Performed by: INTERNAL MEDICINE

## 2017-09-15 ASSESSMENT — ENCOUNTER SYMPTOMS
ORTHOPNEA: 0
DIZZINESS: 0
PND: 0
BRUISES/BLEEDS EASILY: 0
DOUBLE VISION: 0
COUGH: 0
BLOOD IN STOOL: 0
DEPRESSION: 0
MYALGIAS: 0
SHORTNESS OF BREATH: 0
ABDOMINAL PAIN: 0
VOMITING: 0
SPEECH CHANGE: 0
FALLS: 0
BLURRED VISION: 0
CLAUDICATION: 0
NAUSEA: 0
SENSORY CHANGE: 0
FEVER: 0
HEADACHES: 0
EYE PAIN: 0
LOSS OF CONSCIOUSNESS: 0
PALPITATIONS: 0
CHILLS: 0
HALLUCINATIONS: 0
WEIGHT LOSS: 0
EYE DISCHARGE: 0

## 2017-09-15 NOTE — LETTER
[facility]   [address], [address]  [place] [address]  Phone: [phone]  Fax: [phone]              Emili Awad  1985    Encounter Date: 9/15/2017    Qi Hassan M.D.          PROGRESS NOTE:  Subjective:   Emili Awad is a 32 y.o. female who presents today for cardiac care of palpitations. She had Holter monitor shows evidence of frequent PVCs. Her transthoracic echocardiogram is relatively normal.     Cardizem is controlling her symptoms.    Past Medical History:   Diagnosis Date   • Episodic lightheadedness 6/23/2017   • H. pylori infection 2015    treated   • Eczema    • Hypokalemia    • IUD (intrauterine device) in place    • Needle stick injury     took medications, pt was tested negative     No past surgical history on file.  Family History   Problem Relation Age of Onset   • Diabetes Mother    • Hypertension Father    • Cancer Paternal Uncle      lymphoma     History   Smoking Status   • Never Smoker   Smokeless Tobacco   • Never Used     No Known Allergies  Outpatient Encounter Prescriptions as of 9/15/2017   Medication Sig Dispense Refill   • ferrous sulfate 325 (65 FE) MG tablet Take 325 mg by mouth every day.     • diltiazem CD (CARDIZEM CD) 120 MG CAPSULE SR 24 HR Take 1 Cap by mouth every day. 30 Cap 11   • B Complex Vitamins (VITAMIN B COMPLEX PO) Take  by mouth.     • BIOTIN PO Take  by mouth.     • Ascorbic Acid (VITAMIN C PO) Take  by mouth.     • Cholecalciferol (VITAMIN D PO) Take 1 Tab by mouth every day.     • Omega-3 Fatty Acids (FISH OIL PO) Take 1 Cap by mouth every day.     • Multiple Vitamins-Minerals (MULTIVITAMIN PO) Take  by mouth.     • Probiotic Product (PROBIOTIC DAILY PO) Take  by mouth.     • diazepam (VALIUM) 2 MG Tab Take 1 Tab by mouth 1 time daily as needed for Anxiety. (Patient not taking: Reported on 12/13/2016) 5 Tab 0   • POTASSIUM CHLORIDE PO Take  by mouth.       No facility-administered  "encounter medications on file as of 9/15/2017.      Review of Systems   Constitutional: Negative for chills, fever, malaise/fatigue and weight loss.   HENT: Negative for ear discharge, ear pain, hearing loss and nosebleeds.    Eyes: Negative for blurred vision, double vision, pain and discharge.   Respiratory: Negative for cough and shortness of breath.    Cardiovascular: Negative for chest pain, palpitations, orthopnea, claudication, leg swelling and PND.   Gastrointestinal: Negative for abdominal pain, blood in stool, melena, nausea and vomiting.   Genitourinary: Negative for dysuria and hematuria.   Musculoskeletal: Negative for falls, joint pain and myalgias.   Skin: Negative for itching and rash.   Neurological: Negative for dizziness, sensory change, speech change, loss of consciousness and headaches.   Endo/Heme/Allergies: Negative for environmental allergies. Does not bruise/bleed easily.   Psychiatric/Behavioral: Negative for depression, hallucinations and suicidal ideas.        Objective:   /80   Pulse (!) 46   Ht 1.676 m (5' 6\")   Wt 51.7 kg (114 lb)   SpO2 100%   BMI 18.40 kg/m²      Physical Exam   Constitutional: She is oriented to person, place, and time. She appears well-developed and well-nourished.   HENT:   Head: Normocephalic and atraumatic.   Eyes: EOM are normal.   Neck: No JVD present.   Cardiovascular: Normal rate, regular rhythm, normal heart sounds and intact distal pulses.  Exam reveals no gallop and no friction rub.    No murmur heard.  Pulmonary/Chest: No respiratory distress. She has no wheezes. She has no rales. She exhibits no tenderness.   Abdominal: She exhibits no distension. There is no tenderness. There is no rebound and no guarding.   Musculoskeletal: She exhibits no edema or tenderness.   Lymphadenopathy:     She has no cervical adenopathy.   Neurological: She is alert and oriented to person, place, and time.   Skin: Skin is dry.   Psychiatric: She has a normal mood " and affect.   Nursing note and vitals reviewed.      Assessment:     1. Premature ventricular complex         Medical Decision Making:  Today's Assessment / Status / Plan:   At this time patient is clinically stable in terms of her cardiac standpoint.  Blood pressure is well controlled.    Cont current medications at current dose.     I will see patient back in clinic with lab tests and studies results in 6 months.    I thank you for referring patient to our Cardiology Clinic today.        Paige Cruz P.A.-C.  [address] [facility]  [place] [address]  VIA Facsimile: [phone]

## 2017-09-15 NOTE — PROGRESS NOTES
Subjective:   Emili Awad is a 32 y.o. female who presents today for cardiac care of palpitations. She had Holter monitor shows evidence of frequent PVCs. Her transthoracic echocardiogram is relatively normal.     Cardizem is controlling her symptoms.    Past Medical History:   Diagnosis Date   • Episodic lightheadedness 6/23/2017   • H. pylori infection 2015    treated   • Eczema    • Hypokalemia    • IUD (intrauterine device) in place    • Needle stick injury     took medications, pt was tested negative     No past surgical history on file.  Family History   Problem Relation Age of Onset   • Diabetes Mother    • Hypertension Father    • Cancer Paternal Uncle      lymphoma     History   Smoking Status   • Never Smoker   Smokeless Tobacco   • Never Used     No Known Allergies  Outpatient Encounter Prescriptions as of 9/15/2017   Medication Sig Dispense Refill   • ferrous sulfate 325 (65 FE) MG tablet Take 325 mg by mouth every day.     • diltiazem CD (CARDIZEM CD) 120 MG CAPSULE SR 24 HR Take 1 Cap by mouth every day. 30 Cap 11   • B Complex Vitamins (VITAMIN B COMPLEX PO) Take  by mouth.     • BIOTIN PO Take  by mouth.     • Ascorbic Acid (VITAMIN C PO) Take  by mouth.     • Cholecalciferol (VITAMIN D PO) Take 1 Tab by mouth every day.     • Omega-3 Fatty Acids (FISH OIL PO) Take 1 Cap by mouth every day.     • Multiple Vitamins-Minerals (MULTIVITAMIN PO) Take  by mouth.     • Probiotic Product (PROBIOTIC DAILY PO) Take  by mouth.     • diazepam (VALIUM) 2 MG Tab Take 1 Tab by mouth 1 time daily as needed for Anxiety. (Patient not taking: Reported on 12/13/2016) 5 Tab 0   • POTASSIUM CHLORIDE PO Take  by mouth.       No facility-administered encounter medications on file as of 9/15/2017.      Review of Systems   Constitutional: Negative for chills, fever, malaise/fatigue and weight loss.   HENT: Negative for ear discharge, ear pain, hearing loss and nosebleeds.    Eyes: Negative for blurred vision, double  "vision, pain and discharge.   Respiratory: Negative for cough and shortness of breath.    Cardiovascular: Negative for chest pain, palpitations, orthopnea, claudication, leg swelling and PND.   Gastrointestinal: Negative for abdominal pain, blood in stool, melena, nausea and vomiting.   Genitourinary: Negative for dysuria and hematuria.   Musculoskeletal: Negative for falls, joint pain and myalgias.   Skin: Negative for itching and rash.   Neurological: Negative for dizziness, sensory change, speech change, loss of consciousness and headaches.   Endo/Heme/Allergies: Negative for environmental allergies. Does not bruise/bleed easily.   Psychiatric/Behavioral: Negative for depression, hallucinations and suicidal ideas.        Objective:   /80   Pulse (!) 46   Ht 1.676 m (5' 6\")   Wt 51.7 kg (114 lb)   SpO2 100%   BMI 18.40 kg/m²     Physical Exam   Constitutional: She is oriented to person, place, and time. She appears well-developed and well-nourished.   HENT:   Head: Normocephalic and atraumatic.   Eyes: EOM are normal.   Neck: No JVD present.   Cardiovascular: Normal rate, regular rhythm, normal heart sounds and intact distal pulses.  Exam reveals no gallop and no friction rub.    No murmur heard.  Pulmonary/Chest: No respiratory distress. She has no wheezes. She has no rales. She exhibits no tenderness.   Abdominal: She exhibits no distension. There is no tenderness. There is no rebound and no guarding.   Musculoskeletal: She exhibits no edema or tenderness.   Lymphadenopathy:     She has no cervical adenopathy.   Neurological: She is alert and oriented to person, place, and time.   Skin: Skin is dry.   Psychiatric: She has a normal mood and affect.   Nursing note and vitals reviewed.      Assessment:     1. Premature ventricular complex         Medical Decision Making:  Today's Assessment / Status / Plan:   At this time patient is clinically stable in terms of her cardiac standpoint.  Blood pressure is " well controlled.    Cont current medications at current dose.     I will see patient back in clinic with lab tests and studies results in 6 months.    I thank you for referring patient to our Cardiology Clinic today.

## 2017-11-21 ENCOUNTER — IMMUNIZATION (OUTPATIENT)
Dept: OCCUPATIONAL MEDICINE | Facility: CLINIC | Age: 32
End: 2017-11-21

## 2017-11-21 DIAGNOSIS — Z23 NEED FOR VACCINATION: ICD-10-CM

## 2017-11-21 PROCEDURE — 90686 IIV4 VACC NO PRSV 0.5 ML IM: CPT | Performed by: PREVENTIVE MEDICINE

## 2018-01-03 ENCOUNTER — HOSPITAL ENCOUNTER (OUTPATIENT)
Dept: LAB | Facility: MEDICAL CENTER | Age: 33
End: 2018-01-03
Attending: PHYSICIAN ASSISTANT
Payer: COMMERCIAL

## 2018-01-03 LAB
BASOPHILS # BLD AUTO: 0.7 % (ref 0–1.8)
BASOPHILS # BLD: 0.04 K/UL (ref 0–0.12)
EOSINOPHIL # BLD AUTO: 0.01 K/UL (ref 0–0.51)
EOSINOPHIL NFR BLD: 0.2 % (ref 0–6.9)
ERYTHROCYTE [DISTWIDTH] IN BLOOD BY AUTOMATED COUNT: 44.6 FL (ref 35.9–50)
FERRITIN SERPL-MCNC: 47.3 NG/ML (ref 10–291)
HCT VFR BLD AUTO: 38.2 % (ref 37–47)
HGB BLD-MCNC: 13 G/DL (ref 12–16)
IMM GRANULOCYTES # BLD AUTO: 0 K/UL (ref 0–0.11)
IMM GRANULOCYTES NFR BLD AUTO: 0 % (ref 0–0.9)
IRON SATN MFR SERPL: 34 % (ref 15–55)
IRON SERPL-MCNC: 113 UG/DL (ref 40–170)
LYMPHOCYTES # BLD AUTO: 1.73 K/UL (ref 1–4.8)
LYMPHOCYTES NFR BLD: 31.5 % (ref 22–41)
MCH RBC QN AUTO: 33.2 PG (ref 27–33)
MCHC RBC AUTO-ENTMCNC: 34 G/DL (ref 33.6–35)
MCV RBC AUTO: 97.7 FL (ref 81.4–97.8)
MONOCYTES # BLD AUTO: 0.29 K/UL (ref 0–0.85)
MONOCYTES NFR BLD AUTO: 5.3 % (ref 0–13.4)
NEUTROPHILS # BLD AUTO: 3.42 K/UL (ref 2–7.15)
NEUTROPHILS NFR BLD: 62.3 % (ref 44–72)
NRBC # BLD AUTO: 0 K/UL
NRBC BLD-RTO: 0 /100 WBC
PLATELET # BLD AUTO: 189 K/UL (ref 164–446)
PMV BLD AUTO: 11.4 FL (ref 9–12.9)
RBC # BLD AUTO: 3.91 M/UL (ref 4.2–5.4)
T3 SERPL-MCNC: 65.8 NG/DL (ref 60–181)
T4 SERPL-MCNC: 4.6 UG/DL (ref 4–12)
THYROPEROXIDASE AB SERPL-ACNC: 1 IU/ML (ref 0–9)
TIBC SERPL-MCNC: 337 UG/DL (ref 250–450)
TRANSFERRIN SERPL-MCNC: 244 MG/DL (ref 200–370)
TSH SERPL DL<=0.005 MIU/L-ACNC: 0.8 UIU/ML (ref 0.38–5.33)
WBC # BLD AUTO: 5.5 K/UL (ref 4.8–10.8)

## 2018-01-03 PROCEDURE — 82728 ASSAY OF FERRITIN: CPT

## 2018-01-03 PROCEDURE — 84480 ASSAY TRIIODOTHYRONINE (T3): CPT

## 2018-01-03 PROCEDURE — 86376 MICROSOMAL ANTIBODY EACH: CPT

## 2018-01-03 PROCEDURE — 84436 ASSAY OF TOTAL THYROXINE: CPT

## 2018-01-03 PROCEDURE — 36415 COLL VENOUS BLD VENIPUNCTURE: CPT

## 2018-01-03 PROCEDURE — 83550 IRON BINDING TEST: CPT

## 2018-01-03 PROCEDURE — 84466 ASSAY OF TRANSFERRIN: CPT

## 2018-01-03 PROCEDURE — 84443 ASSAY THYROID STIM HORMONE: CPT

## 2018-01-03 PROCEDURE — 83540 ASSAY OF IRON: CPT

## 2018-01-03 PROCEDURE — 85025 COMPLETE CBC W/AUTO DIFF WBC: CPT

## 2018-02-16 ENCOUNTER — HOSPITAL ENCOUNTER (OUTPATIENT)
Dept: LAB | Facility: MEDICAL CENTER | Age: 33
End: 2018-02-16
Attending: PHYSICIAN ASSISTANT
Payer: COMMERCIAL

## 2018-02-16 LAB
CORTIS SERPL-MCNC: 17.1 UG/DL (ref 0–23)
MAGNESIUM SERPL-MCNC: 2.1 MG/DL (ref 1.5–2.5)

## 2018-02-16 PROCEDURE — 36415 COLL VENOUS BLD VENIPUNCTURE: CPT

## 2018-02-16 PROCEDURE — 82533 TOTAL CORTISOL: CPT

## 2018-02-16 PROCEDURE — 83735 ASSAY OF MAGNESIUM: CPT

## 2018-03-15 ENCOUNTER — OFFICE VISIT (OUTPATIENT)
Dept: CARDIOLOGY | Facility: MEDICAL CENTER | Age: 33
End: 2018-03-15
Payer: COMMERCIAL

## 2018-03-15 VITALS
DIASTOLIC BLOOD PRESSURE: 74 MMHG | BODY MASS INDEX: 18.48 KG/M2 | HEIGHT: 66 IN | SYSTOLIC BLOOD PRESSURE: 104 MMHG | WEIGHT: 115 LBS | HEART RATE: 48 BPM

## 2018-03-15 DIAGNOSIS — I49.3 PREMATURE VENTRICULAR COMPLEX: ICD-10-CM

## 2018-03-15 DIAGNOSIS — I49.8 BIGEMINY: ICD-10-CM

## 2018-03-15 DIAGNOSIS — R42 EPISODIC LIGHTHEADEDNESS: ICD-10-CM

## 2018-03-15 DIAGNOSIS — R00.1 BRADYCARDIA: ICD-10-CM

## 2018-03-15 DIAGNOSIS — I49.1 PREMATURE ATRIAL CONTRACTION: ICD-10-CM

## 2018-03-15 DIAGNOSIS — R00.1 SINUS BRADYCARDIA: ICD-10-CM

## 2018-03-15 PROCEDURE — 99214 OFFICE O/P EST MOD 30 MIN: CPT | Performed by: INTERNAL MEDICINE

## 2018-03-15 ASSESSMENT — ENCOUNTER SYMPTOMS
CHILLS: 0
FALLS: 0
HEADACHES: 0
HALLUCINATIONS: 0
CLAUDICATION: 0
PALPITATIONS: 0
FEVER: 0
MYALGIAS: 0
SENSORY CHANGE: 0
SPEECH CHANGE: 0
NAUSEA: 0
COUGH: 0
VOMITING: 0
DEPRESSION: 0
ABDOMINAL PAIN: 0
WEIGHT LOSS: 0
DIZZINESS: 0
BRUISES/BLEEDS EASILY: 0
BLURRED VISION: 0
PND: 0
ORTHOPNEA: 0
LOSS OF CONSCIOUSNESS: 0
EYE DISCHARGE: 0
DOUBLE VISION: 0
BLOOD IN STOOL: 0
SHORTNESS OF BREATH: 0
EYE PAIN: 0

## 2018-03-15 NOTE — PROGRESS NOTES
Subjective:   Emili Awad is a 32 y.o. female who presents today for cardiac care of palpitations. She had Holter monitor shows evidence of frequent PVCs. Her transthoracic echocardiogram is relatively normal.      Cardizem is NOT controlling her symptoms.    CC: palpitations some anxiety.    Past Medical History:   Diagnosis Date   • Eczema    • Episodic lightheadedness 6/23/2017   • H. pylori infection 2015    treated   • Hypokalemia    • IUD (intrauterine device) in place    • Needle stick injury     took medications, pt was tested negative     History reviewed. No pertinent surgical history.  Family History   Problem Relation Age of Onset   • Diabetes Mother    • Hypertension Father    • Cancer Paternal Uncle      lymphoma     History   Smoking Status   • Never Smoker   Smokeless Tobacco   • Never Used     No Known Allergies  Outpatient Encounter Prescriptions as of 3/15/2018   Medication Sig Dispense Refill   • B Complex Vitamins (VITAMIN B COMPLEX PO) Take  by mouth.     • Ascorbic Acid (VITAMIN C PO) Take  by mouth.     • Cholecalciferol (VITAMIN D PO) Take 1 Tab by mouth every day.     • Multiple Vitamins-Minerals (MULTIVITAMIN PO) Take  by mouth.     • Probiotic Product (PROBIOTIC DAILY PO) Take  by mouth.     • [DISCONTINUED] ferrous sulfate 325 (65 FE) MG tablet Take 325 mg by mouth every day.     • [DISCONTINUED] diltiazem CD (CARDIZEM CD) 120 MG CAPSULE SR 24 HR Take 1 Cap by mouth every day. 30 Cap 11   • [DISCONTINUED] BIOTIN PO Take  by mouth.     • [DISCONTINUED] diazepam (VALIUM) 2 MG Tab Take 1 Tab by mouth 1 time daily as needed for Anxiety. (Patient not taking: Reported on 12/13/2016) 5 Tab 0   • [DISCONTINUED] Omega-3 Fatty Acids (FISH OIL PO) Take 1 Cap by mouth every day.     • [DISCONTINUED] POTASSIUM CHLORIDE PO Take  by mouth.       No facility-administered encounter medications on file as of 3/15/2018.      Review of Systems   Constitutional: Negative for chills, fever,  "malaise/fatigue and weight loss.   HENT: Negative for ear discharge, ear pain, hearing loss and nosebleeds.    Eyes: Negative for blurred vision, double vision, pain and discharge.   Respiratory: Negative for cough and shortness of breath.    Cardiovascular: Negative for chest pain, palpitations, orthopnea, claudication, leg swelling and PND.   Gastrointestinal: Negative for abdominal pain, blood in stool, melena, nausea and vomiting.   Genitourinary: Negative for dysuria and hematuria.   Musculoskeletal: Negative for falls, joint pain and myalgias.   Skin: Negative for itching and rash.   Neurological: Negative for dizziness, sensory change, speech change, loss of consciousness and headaches.   Endo/Heme/Allergies: Negative for environmental allergies. Does not bruise/bleed easily.   Psychiatric/Behavioral: Negative for depression, hallucinations and suicidal ideas.        Objective:   /74   Pulse (!) 48   Ht 1.676 m (5' 6\")   Wt 52.2 kg (115 lb)   BMI 18.56 kg/m²     Physical Exam   Constitutional: She is oriented to person, place, and time. She appears well-developed and well-nourished.   HENT:   Head: Normocephalic and atraumatic.   Eyes: EOM are normal.   Neck: No JVD present.   Cardiovascular: Normal rate, regular rhythm, normal heart sounds and intact distal pulses.  Exam reveals no gallop and no friction rub.    No murmur heard.  Pulmonary/Chest: No respiratory distress. She has no wheezes. She has no rales. She exhibits no tenderness.   Abdominal: She exhibits no distension. There is no tenderness. There is no rebound and no guarding.   Musculoskeletal: She exhibits no edema or tenderness.   Lymphadenopathy:     She has no cervical adenopathy.   Neurological: She is alert and oriented to person, place, and time.   Skin: Skin is dry.   Psychiatric: She has a normal mood and affect.   Nursing note and vitals reviewed.      Assessment:     1. Premature ventricular complex     2. Episodic " lightheadedness     3. Bradycardia     4. Bigeminy     5. Premature atrial contraction     6. Sinus bradycardia         Medical Decision Making:  Today's Assessment / Status / Plan:   Try to stop Cardizem.    At this time patient is clinically stable in terms of her cardiac standpoint.  Blood pressure is well controlled.    Sinus bradycardia is not an issue.    Continue electrolytes hydration.    I will see patient back in clinic with lab tests and studies results in 12 months.    I thank you Dr. Dillon for referring patient to our Cardiology Clinic today.

## 2018-03-15 NOTE — LETTER
[facility]   [address], [address]  [place] [address]  Phone: [phone]  Fax: [phone]              Emili Awad  1985    Encounter Date: 3/15/2018    Qi Hassan M.D.          PROGRESS NOTE:  Subjective:   Emili Awad is a 32 y.o. female who presents today for cardiac care of palpitations. She had Holter monitor shows evidence of frequent PVCs. Her transthoracic echocardiogram is relatively normal.      Cardizem is NOT controlling her symptoms.    CC: palpitations some anxiety.    Past Medical History:   Diagnosis Date   • Eczema    • Episodic lightheadedness 6/23/2017   • H. pylori infection 2015    treated   • Hypokalemia    • IUD (intrauterine device) in place    • Needle stick injury     took medications, pt was tested negative     History reviewed. No pertinent surgical history.  Family History   Problem Relation Age of Onset   • Diabetes Mother    • Hypertension Father    • Cancer Paternal Uncle      lymphoma     History   Smoking Status   • Never Smoker   Smokeless Tobacco   • Never Used     No Known Allergies  Outpatient Encounter Prescriptions as of 3/15/2018   Medication Sig Dispense Refill   • B Complex Vitamins (VITAMIN B COMPLEX PO) Take  by mouth.     • Ascorbic Acid (VITAMIN C PO) Take  by mouth.     • Cholecalciferol (VITAMIN D PO) Take 1 Tab by mouth every day.     • Multiple Vitamins-Minerals (MULTIVITAMIN PO) Take  by mouth.     • Probiotic Product (PROBIOTIC DAILY PO) Take  by mouth.     • [DISCONTINUED] ferrous sulfate 325 (65 FE) MG tablet Take 325 mg by mouth every day.     • [DISCONTINUED] diltiazem CD (CARDIZEM CD) 120 MG CAPSULE SR 24 HR Take 1 Cap by mouth every day. 30 Cap 11   • [DISCONTINUED] BIOTIN PO Take  by mouth.     • [DISCONTINUED] diazepam (VALIUM) 2 MG Tab Take 1 Tab by mouth 1 time daily as needed for Anxiety. (Patient not taking: Reported on 12/13/2016) 5 Tab 0   • [DISCONTINUED] Omega-3 Fatty  "Acids (FISH OIL PO) Take 1 Cap by mouth every day.     • [DISCONTINUED] POTASSIUM CHLORIDE PO Take  by mouth.       No facility-administered encounter medications on file as of 3/15/2018.      Review of Systems   Constitutional: Negative for chills, fever, malaise/fatigue and weight loss.   HENT: Negative for ear discharge, ear pain, hearing loss and nosebleeds.    Eyes: Negative for blurred vision, double vision, pain and discharge.   Respiratory: Negative for cough and shortness of breath.    Cardiovascular: Negative for chest pain, palpitations, orthopnea, claudication, leg swelling and PND.   Gastrointestinal: Negative for abdominal pain, blood in stool, melena, nausea and vomiting.   Genitourinary: Negative for dysuria and hematuria.   Musculoskeletal: Negative for falls, joint pain and myalgias.   Skin: Negative for itching and rash.   Neurological: Negative for dizziness, sensory change, speech change, loss of consciousness and headaches.   Endo/Heme/Allergies: Negative for environmental allergies. Does not bruise/bleed easily.   Psychiatric/Behavioral: Negative for depression, hallucinations and suicidal ideas.        Objective:   /74   Pulse (!) 48   Ht 1.676 m (5' 6\")   Wt 52.2 kg (115 lb)   BMI 18.56 kg/m²      Physical Exam   Constitutional: She is oriented to person, place, and time. She appears well-developed and well-nourished.   HENT:   Head: Normocephalic and atraumatic.   Eyes: EOM are normal.   Neck: No JVD present.   Cardiovascular: Normal rate, regular rhythm, normal heart sounds and intact distal pulses.  Exam reveals no gallop and no friction rub.    No murmur heard.  Pulmonary/Chest: No respiratory distress. She has no wheezes. She has no rales. She exhibits no tenderness.   Abdominal: She exhibits no distension. There is no tenderness. There is no rebound and no guarding.   Musculoskeletal: She exhibits no edema or tenderness.   Lymphadenopathy:     She has no cervical adenopathy.  "   Neurological: She is alert and oriented to person, place, and time.   Skin: Skin is dry.   Psychiatric: She has a normal mood and affect.   Nursing note and vitals reviewed.      Assessment:     1. Premature ventricular complex     2. Episodic lightheadedness     3. Bradycardia     4. Bigeminy     5. Premature atrial contraction     6. Sinus bradycardia         Medical Decision Making:  Today's Assessment / Status / Plan:   Try to stop Cardizem.    At this time patient is clinically stable in terms of her cardiac standpoint.  Blood pressure is well controlled.    Sinus bradycardia is not an issue.    Continue electrolytes hydration.    I will see patient back in clinic with lab tests and studies results in 12 months.    I thank you Dr. Dillon for referring patient to our Cardiology Clinic today.        Miguelito Dillon M.D.  [address] [facility]  [address] [place] [id number]  VIA Facsimile: [phone]

## 2018-05-22 ENCOUNTER — HOSPITAL ENCOUNTER (OUTPATIENT)
Facility: MEDICAL CENTER | Age: 33
End: 2018-05-22
Attending: OBSTETRICS & GYNECOLOGY
Payer: COMMERCIAL

## 2018-05-22 ENCOUNTER — GYNECOLOGY VISIT (OUTPATIENT)
Dept: OBGYN | Facility: CLINIC | Age: 33
End: 2018-05-22
Payer: COMMERCIAL

## 2018-05-22 VITALS
WEIGHT: 113 LBS | SYSTOLIC BLOOD PRESSURE: 102 MMHG | DIASTOLIC BLOOD PRESSURE: 64 MMHG | HEIGHT: 66 IN | BODY MASS INDEX: 18.16 KG/M2

## 2018-05-22 DIAGNOSIS — Z12.4 SCREENING FOR CERVICAL CANCER: ICD-10-CM

## 2018-05-22 DIAGNOSIS — Z11.51 SPECIAL SCREENING EXAMINATION FOR HUMAN PAPILLOMAVIRUS (HPV): ICD-10-CM

## 2018-05-22 DIAGNOSIS — Z30.431 IUD CHECK UP: ICD-10-CM

## 2018-05-22 DIAGNOSIS — N92.6 IRREGULAR MENSTRUATION: ICD-10-CM

## 2018-05-22 DIAGNOSIS — Z01.419 WELL WOMAN EXAM: ICD-10-CM

## 2018-05-22 PROCEDURE — 88175 CYTOPATH C/V AUTO FLUID REDO: CPT

## 2018-05-22 PROCEDURE — 99385 PREV VISIT NEW AGE 18-39: CPT | Performed by: OBSTETRICS & GYNECOLOGY

## 2018-05-22 PROCEDURE — 87624 HPV HI-RISK TYP POOLED RSLT: CPT

## 2018-05-22 NOTE — PROGRESS NOTES
Emili Awad is a 33 y.o.  female who presents for her Annual Gynecologic Exam      [facility] Comments: Pt presents for her annual well woman exam.   Pt has complaint of absent menses x 5 months, then had menses 18. Has ParaGard IUD for birth control. Menses irregular past year. Sometimes skips 1-2 months. Exercises daily and states eats a healthy diet, denies fasting or vomiting. Last pap 1 year ago and normal.  Patient's last menstrual period was 2018.    Review of Systems:   Pertinent positives documented in HPI and all other systems reviewed & are negative    PGYN Hx: no abnl pap, no STD, ParaGard IUD for contraception    All PMH, PSH, allergies, social history and FH reviewed and updated today:  Past Medical History:   Diagnosis Date   • Eczema    • Episodic lightheadedness 2017   • H. pylori infection     treated   • Hypokalemia    • IUD (intrauterine device) in place    • Needle stick injury     took medications, pt was tested negative       History reviewed. No pertinent surgical history.    Medications:   Current Outpatient Prescriptions Ordered in [facility]   Medication Sig Dispense Refill   • B Complex Vitamins (VITAMIN B COMPLEX PO) Take  by mouth.     • Ascorbic Acid (VITAMIN C PO) Take  by mouth.     • Cholecalciferol (VITAMIN D PO) Take 1 Tab by mouth every day.     • Multiple Vitamins-Minerals (MULTIVITAMIN PO) Take  by mouth.     • Probiotic Product (PROBIOTIC DAILY PO) Take  by mouth.       No current Epic-ordered facility-administered medications on file.        Patient has no known allergies.    Social History     Social History   • Marital status:      Spouse name: N/A   • Number of children: N/A   • Years of education: N/A     Occupational History   • mammography tech      Social History Main Topics   • Smoking status: Never Smoker   • Smokeless tobacco: Never Used   • Alcohol use 0.0 oz/week      Comment: occasional   • Drug use: No   • Sexual activity: Yes      "Partners: Male     Birth control/ protection: IUD     Other Topics Concern   • Not on file     Social History Narrative    , no children.        Family History   Problem Relation Age of Onset   • Diabetes Mother    • Hypertension Father    • Cancer Paternal Uncle      lymphoma          Objective:   Vital measurements:  Blood pressure 102/64, height 1.676 m (5' 6\"), weight 51.3 kg (113 lb), last menstrual period 04/22/2018, not currently breastfeeding.  Body mass index is 18.24 kg/m². (Goal BM I>18 <25)    Physical Exam   Nursing note and vitals reviewed.  Constitutional: She is oriented to person, place, and time. She appears well-developed and well-nourished. No distress.     HEENT:   Head: Normocephalic and atraumatic.   Right Ear: External ear normal.   Left Ear: External ear normal.   Nose: Nose normal.   Eyes: Conjunctivae and EOM are normal. Pupils are equal, round, and reactive to light. No scleral icterus.     Neck: Normal range of motion. Neck supple. No tracheal deviation present. No thyromegaly present. No cervical or supraclavicular lymphadenopathy.    Pulmonary/Chest: Effort normal and breath sounds normal. No respiratory distress. She has no wheezes. She has no rales. She exhibits no tenderness.     Cardiovascular: Regular, rate and rhythm. No edema.    Breast: Symmetrical, normal consistency without masses., No dimpling or skin changes, Normal nipples without discharge, no axillary lymphadenopathy    Abdominal: Soft. Bowel sounds are normal. She exhibits no distension and no mass. No tenderness. She has no rebound and no guarding.     Genitourinary:  Pelvic exam was performed with patient supine.  External genitalia with no abnormal pigmentation, labial fusion, rashes, tenderness, lesions or injury to the labia bilaterally.  BUS normal  Vagina is pink and moist with no lesions, foul discharge, erythema, tenderness or bleeding. No foreign body around the vagina or signs of injury.   Cervix " exhibits no motion tenderness, no discharge and no friability, no lesions. IUD strings seen  Uterus is not deviated, not enlarged, not fixed and not tender.  Right adnexa displays no mass, no tenderness and no fullness.  Left adnexa displays no mass, no tenderness and no fullness.     Musculoskeletal: Normal range of motion. Non tender. She exhibits no edema and no tenderness.     Lymphadenopathy: She has no cervical or supraclavicular adenopathy.     Neurological: She is alert and oriented to person, place, and time. She exhibits normal muscle tone.     Skin: Skin is warm and dry. No rash noted. She is not diaphoretic. No erythema. No pallor.     Psychiatric: She has a normal mood and affect. Her behavior is normal. Judgment and thought content normal.        Assessment:     1. Well woman exam  THINPREP PAP WITH HPV   2. Screening for cervical cancer  THINPREP PAP WITH HPV   3. Special screening examination for human papillomavirus (HPV)  THINPREP PAP WITH HPV   4. Irregular menstruation  TSH+PRL+FSH+TESTT+LH+T4F+DH    ESTRADIOL    PROGESTERONE    ANTI-MULLERIAN HORMONE(AMH)   5. IUD check up     6. BMI less than 19,adult           Plan:   Pap and physical exam performed  Self breast awareness discussed.  Encouraged exercise and proper diet.  Mammograms starting @ age 40 annually.  See medications and orders placed in encounter report.  Will check hormone labs to rule out hormonal etiology or menopause as etiology of irregular menses  Suspect irregular menses due to her low body mass index - advised high protein and high fat diet  Follow up in 1 year for annual exam or sooner as needed

## 2018-05-23 LAB
CYTOLOGY REG CYTOL: NORMAL
HPV HR 12 DNA CVX QL NAA+PROBE: NEGATIVE
HPV16 DNA SPEC QL NAA+PROBE: NEGATIVE
HPV18 DNA SPEC QL NAA+PROBE: NEGATIVE
SPECIMEN SOURCE: NORMAL

## 2018-06-01 ENCOUNTER — HOSPITAL ENCOUNTER (OUTPATIENT)
Dept: LAB | Facility: MEDICAL CENTER | Age: 33
End: 2018-06-01
Attending: OBSTETRICS & GYNECOLOGY
Payer: COMMERCIAL

## 2018-06-01 DIAGNOSIS — N92.6 IRREGULAR MENSTRUATION: ICD-10-CM

## 2018-06-01 LAB
DHEA-S SERPL-MCNC: 165.9 UG/DL (ref 98.8–340)
ESTRADIOL SERPL-MCNC: 37 PG/ML
FSH SERPL-ACNC: 8.5 MIU/ML
LH SERPL-ACNC: 1 IU/L
PROGEST SERPL-MCNC: 0.19 NG/ML
PROLACTIN SERPL-MCNC: 8.94 NG/ML (ref 2.8–26)
T4 FREE SERPL-MCNC: 0.58 NG/DL (ref 0.53–1.43)
TSH SERPL DL<=0.005 MIU/L-ACNC: 1.05 UIU/ML (ref 0.38–5.33)

## 2018-06-01 PROCEDURE — 84270 ASSAY OF SEX HORMONE GLOBUL: CPT

## 2018-06-01 PROCEDURE — 83002 ASSAY OF GONADOTROPIN (LH): CPT

## 2018-06-01 PROCEDURE — 82627 DEHYDROEPIANDROSTERONE: CPT

## 2018-06-01 PROCEDURE — 82670 ASSAY OF TOTAL ESTRADIOL: CPT

## 2018-06-01 PROCEDURE — 84146 ASSAY OF PROLACTIN: CPT

## 2018-06-01 PROCEDURE — 84403 ASSAY OF TOTAL TESTOSTERONE: CPT

## 2018-06-01 PROCEDURE — 84144 ASSAY OF PROGESTERONE: CPT

## 2018-06-01 PROCEDURE — 83001 ASSAY OF GONADOTROPIN (FSH): CPT

## 2018-06-01 PROCEDURE — 36415 COLL VENOUS BLD VENIPUNCTURE: CPT

## 2018-06-01 PROCEDURE — 84443 ASSAY THYROID STIM HORMONE: CPT

## 2018-06-01 PROCEDURE — 84439 ASSAY OF FREE THYROXINE: CPT

## 2018-06-06 LAB
SHBG SERPL-SCNC: 108 NMOL/L (ref 30–135)
TESTOST FREE SERPL-MCNC: 2.1 PG/ML (ref 1.3–9.2)
TESTOST SERPL-MCNC: 29 NG/DL (ref 9–55)

## 2018-06-14 ENCOUNTER — HOSPITAL ENCOUNTER (OUTPATIENT)
Dept: LAB | Facility: MEDICAL CENTER | Age: 33
End: 2018-06-14
Attending: PHYSICIAN ASSISTANT
Payer: COMMERCIAL

## 2018-06-14 ENCOUNTER — HOSPITAL ENCOUNTER (OUTPATIENT)
Dept: RADIOLOGY | Facility: MEDICAL CENTER | Age: 33
End: 2018-06-14
Attending: PHYSICIAN ASSISTANT
Payer: COMMERCIAL

## 2018-06-14 DIAGNOSIS — M79.644 THUMB PAIN, RIGHT: ICD-10-CM

## 2018-06-14 LAB
APTT PPP: 26.4 SEC (ref 24.7–36)
BASOPHILS # BLD AUTO: 0.6 % (ref 0–1.8)
BASOPHILS # BLD: 0.03 K/UL (ref 0–0.12)
EOSINOPHIL # BLD AUTO: 0.01 K/UL (ref 0–0.51)
EOSINOPHIL NFR BLD: 0.2 % (ref 0–6.9)
ERYTHROCYTE [DISTWIDTH] IN BLOOD BY AUTOMATED COUNT: 44.9 FL (ref 35.9–50)
HCT VFR BLD AUTO: 40.5 % (ref 37–47)
HGB BLD-MCNC: 14.1 G/DL (ref 12–16)
IMM GRANULOCYTES # BLD AUTO: 0.01 K/UL (ref 0–0.11)
IMM GRANULOCYTES NFR BLD AUTO: 0.2 % (ref 0–0.9)
INR PPP: 1.06 (ref 0.87–1.13)
LYMPHOCYTES # BLD AUTO: 1.63 K/UL (ref 1–4.8)
LYMPHOCYTES NFR BLD: 30.6 % (ref 22–41)
MCH RBC QN AUTO: 34.4 PG (ref 27–33)
MCHC RBC AUTO-ENTMCNC: 34.8 G/DL (ref 33.6–35)
MCV RBC AUTO: 98.8 FL (ref 81.4–97.8)
MONOCYTES # BLD AUTO: 0.42 K/UL (ref 0–0.85)
MONOCYTES NFR BLD AUTO: 7.9 % (ref 0–13.4)
NEUTROPHILS # BLD AUTO: 3.23 K/UL (ref 2–7.15)
NEUTROPHILS NFR BLD: 60.5 % (ref 44–72)
NRBC # BLD AUTO: 0 K/UL
NRBC BLD-RTO: 0 /100 WBC
PLATELET # BLD AUTO: 219 K/UL (ref 164–446)
PMV BLD AUTO: 11 FL (ref 9–12.9)
PROTHROMBIN TIME: 13.5 SEC (ref 12–14.6)
RBC # BLD AUTO: 4.1 M/UL (ref 4.2–5.4)
WBC # BLD AUTO: 5.3 K/UL (ref 4.8–10.8)

## 2018-06-14 PROCEDURE — 85610 PROTHROMBIN TIME: CPT

## 2018-06-14 PROCEDURE — 36415 COLL VENOUS BLD VENIPUNCTURE: CPT

## 2018-06-14 PROCEDURE — 85730 THROMBOPLASTIN TIME PARTIAL: CPT

## 2018-06-14 PROCEDURE — 85025 COMPLETE CBC W/AUTO DIFF WBC: CPT

## 2018-06-14 PROCEDURE — 73140 X-RAY EXAM OF FINGER(S): CPT | Mod: RT

## 2018-07-25 ENCOUNTER — HOSPITAL ENCOUNTER (OUTPATIENT)
Dept: LAB | Facility: MEDICAL CENTER | Age: 33
End: 2018-07-25
Attending: NURSE PRACTITIONER
Payer: COMMERCIAL

## 2018-07-25 ENCOUNTER — HOSPITAL ENCOUNTER (OUTPATIENT)
Dept: LAB | Facility: MEDICAL CENTER | Age: 33
End: 2018-07-25
Payer: COMMERCIAL

## 2018-07-25 LAB
BDY FAT % MEASURED: 19.4 %
BP DIAS: 64 MMHG
BP SYS: 94 MMHG
DIABETES HTDIA: NO
EVENT NAME HTEVT: NORMAL
FOLATE SERPL-MCNC: >24 NG/ML
HYPERTENSION HTHYP: NO
SCREENING LOC CITY HTCIT: NORMAL
SCREENING LOC STATE HTSTA: NORMAL
SCREENING LOCATION HTLOC: NORMAL
SMOKING HTSMO: NO
SUBSCRIBER ID HTSID: NORMAL
VIT B12 SERPL-MCNC: 825 PG/ML (ref 211–911)

## 2018-07-25 PROCEDURE — 83516 IMMUNOASSAY NONANTIBODY: CPT

## 2018-07-25 PROCEDURE — 82668 ASSAY OF ERYTHROPOIETIN: CPT

## 2018-07-25 PROCEDURE — 82746 ASSAY OF FOLIC ACID SERUM: CPT

## 2018-07-25 PROCEDURE — 82607 VITAMIN B-12: CPT

## 2018-07-26 LAB
CHOLEST SERPL-MCNC: 187 MG/DL (ref 100–199)
EPO SERPL-ACNC: 10 MU/ML (ref 4–27)
FASTING HTFAS: YES
GLUCOSE SERPL-MCNC: 76 MG/DL (ref 65–99)
HDLC SERPL-MCNC: 95 MG/DL
LDLC SERPL CALC-MCNC: 75 MG/DL
TRIGL SERPL-MCNC: 86 MG/DL (ref 0–149)

## 2018-07-27 LAB — PCA IGG SER-ACNC: 1.7 UNITS (ref 0–24.9)

## 2018-09-11 NOTE — PROGRESS NOTES
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 Miguelito Dillon's office and Theresa BOGGS for evaluation of possible obstructive sleep apnea syndrome.    The patient briefly describes the presenting problem as  \"feel faint and really tired at times especially after certain workouts \". The patient grades the severity as moderate.  Symptom duration has been approximately 1 year.    She had an overnight oximetry performed on July 27, 2018 which showed saturations less than 90% for 5.5% of the recording with a low saturation of 49% which is likely artifactual.  Her saturations were less than 88% for 23.7 minutes. Review of the oximetric pattern suggest possible artifact.    The patient's bedtime is generally 10 PM to 11 PM and she gets up between 6 and 8 AM.  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  He has a regular bed partner and estimates that her sleep latency is between 30-40 minutes.  She may read or look at her phone just prior to turning of the lights and attempting to go to sleep.  She experiences 3 episodes of nocturia each night.  Her symptoms include sometimes difficulty awakening and getting out of bed after sleeping, resuscitative snorts, sleepiness during the day, too little sleep at night, tiredness during the day, but denies snoring.  She may experience restless legs as often as once a week.  She denies symptoms to suggest narcolepsy, periodic limb movements, or other parasomnias.    Review of her sleep diary showed no napping.  She awakened feeling fine good 5 out of 7 days and tired or little tired 2 of 7 days.  Her sleep times varied between a low about 6 hours and a high of about 9 hours.  Total [place] score is 4 out of 24.        Patient Active Problem List    Diagnosis Date Noted   • Premature ventricular complex 09/15/2017   • Episodic " "lightheadedness 06/23/2017   • Secondary amenorrhea 01/13/2017   • BMI less than 19,adult 12/13/2016   • Abdominal discomfort 12/13/2016   • Abdominal bloating 12/13/2016   • Irregular menses 12/13/2016   • Health examination of defined subpopulation 11/01/2013       Past Medical History:   Diagnosis Date   • Cardiac arrhythmia    • Chickenpox    • Eczema    • Episodic lightheadedness 6/23/2017   • H. pylori infection 2015    treated   • Hypokalemia    • IUD (intrauterine device) in place    • Needle stick injury     took medications, pt was tested negative        History reviewed. No pertinent surgical history.    Family History   Problem Relation Age of Onset   • Diabetes Mother    • Hypertension Father    • Cancer Paternal Uncle         lymphoma       Social History     Social History   • Marital status:      Spouse name: N/A   • Number of children: N/A   • Years of education: N/A     Occupational History   • mammography tech      Social History Main Topics   • Smoking status: Never Smoker   • Smokeless tobacco: Never Used   • Alcohol use 0.0 oz/week      Comment: occasional   • Drug use: No   • Sexual activity: Yes     Partners: Male     Birth control/ protection: IUD     Other Topics Concern   • Not on file     Social History Narrative    , no children.        Current Outpatient Prescriptions   Medication Sig Dispense Refill   • zolpidem (AMBIEN) 5 MG Tab Take 1 Tab by mouth at bedtime as needed for up to 3 doses. Take 1-3 tabs prn 3 Tab 0   • B Complex Vitamins (VITAMIN B COMPLEX PO) Take  by mouth.     • Ascorbic Acid (VITAMIN C PO) Take  by mouth.     • Cholecalciferol (VITAMIN D PO) Take 1 Tab by mouth every day.     • Multiple Vitamins-Minerals (MULTIVITAMIN PO) Take  by mouth.     • Probiotic Product (PROBIOTIC DAILY PO) Take  by mouth.       No current facility-administered medications for this visit.     \"CURRENT RX\"    ALLERGIES: Patient has no known allergies.    ROS    Constitutional: " "Denies fever, chills, sweats,  weight loss, positive for   fatigue.  Eyes: Denies vision loss, pain, drainage, double vision, wears glasses.  Ears/Nose/Mouth/Throat: Denies earache, difficulty hearing, rhinitis/nasal congestion, injury, recurrent sore throat, persistent hoarseness, decayed teeth, ringing or buzzing in the ears.  Positive for recent toothache  Cardiovascular: Denies chest pain, tightness, palpitations, swelling in legs/feet, fainting, difficulty breathing when lying down but gets better when sitting up.   Respiratory: Denies shortness of breath, cough, sputum, wheezing, painful breathing, coughing up blood.   Sleep: per HPI  Gastrointestinal: Denies  difficulty swallowing, nausea, abdominal pain, positive for diarrhea, constipation, heartburn.  Genitourinary: Denies  blood in urine, discharge, positive for frequent urination.  Has irregular periods  Musculoskeletal: Denies painful joints, positive for sore muscles, denies back pain.   Integumentary: Denies rashes, lumps, color changes.   Neurological: Denies frequent headaches,weakness, dizziness.    PHYSICAL EXAM  Thin    /60   Pulse 84   Resp 14   Ht 1.676 m (5' 6\")   Wt 52.2 kg (115 lb)   SpO2 95%   BMI 18.56 kg/m²   Appearance: Well-nourished, well-developed, no acute distress  Eyes:  PERRLA, EOMI  ENMT: without lesions, deformities;hearing grossly intact; tongue normal, posterior pharynx without erythema or exudate; Mallampati classification:   Neck: Supple, trachea midline, no masses  Respiratory effort:  No intercostal retractions or use of accessory muscles  Lung auscultation:  No wheezes rhonchi rubs or rales  Cardiac: No murmurs, rubs, or gallops; regular rhythm, normal rate; no edema  Abdomen:  No tenderness, no organomegaly  Musculoskeletal:  Grossly normal; gait and station normal; digits and nails normal  Skin:  No rashes, petechiae, cyanosis  Neurologic: without focal signs; oriented to person, time, place, and purpose; " judgement intact  Psychiatric:  No depression, anxiety, agitation        PROBLEMS:  1. MANUEL (obstructive sleep apnea)    - OVERNIGHT HOME SLEEP STUDY; Future          PLAN:   The patient has signs and symptoms consistent with obstructive sleep apnea hypopnea syndrome. Will schedule to have a HST per her request.    The risks of untreated sleep apnea were discussed with the patient at length. Patients with MANUEL are at increased risk of cardiovascular disease including coronary artery disease, systemic arterial hypertension, pulmonary arterial hypertension, cardiac arrythmias, and stroke. MANUEL patients have an increased risk of motor vehicle accidents, type 2 diabetes, chronic kidney disease, and non-alcoholic liver disease. The patient was advised to avoid driving a motor vehicle when drowsy.    Positive airway pressure, such as CPAP, is considered first-line and preferred therapy for sleep apnea and may reverse both symptoms and risks.       Return for after sleep study.

## 2018-09-12 ENCOUNTER — SLEEP CENTER VISIT (OUTPATIENT)
Dept: SLEEP MEDICINE | Facility: MEDICAL CENTER | Age: 33
End: 2018-09-12
Payer: COMMERCIAL

## 2018-09-12 VITALS
SYSTOLIC BLOOD PRESSURE: 100 MMHG | HEART RATE: 84 BPM | HEIGHT: 66 IN | DIASTOLIC BLOOD PRESSURE: 60 MMHG | OXYGEN SATURATION: 95 % | BODY MASS INDEX: 18.48 KG/M2 | RESPIRATION RATE: 14 BRPM | WEIGHT: 115 LBS

## 2018-09-12 DIAGNOSIS — G47.33 OSA (OBSTRUCTIVE SLEEP APNEA): ICD-10-CM

## 2018-09-12 PROCEDURE — 99244 OFF/OP CNSLTJ NEW/EST MOD 40: CPT | Performed by: INTERNAL MEDICINE

## 2018-09-12 RX ORDER — ZOLPIDEM TARTRATE 5 MG/1
5 TABLET ORAL NIGHTLY PRN
Qty: 3 TAB | Refills: 0 | Status: SHIPPED | OUTPATIENT
Start: 2018-09-12 | End: 2018-09-12

## 2018-09-26 ENCOUNTER — IMMUNIZATION (OUTPATIENT)
Dept: OCCUPATIONAL MEDICINE | Facility: CLINIC | Age: 33
End: 2018-09-26

## 2018-09-26 DIAGNOSIS — Z23 NEED FOR VACCINATION: ICD-10-CM

## 2018-10-05 PROCEDURE — 90686 IIV4 VACC NO PRSV 0.5 ML IM: CPT | Performed by: PREVENTIVE MEDICINE

## 2018-10-08 ENCOUNTER — HOSPITAL ENCOUNTER (OUTPATIENT)
Facility: MEDICAL CENTER | Age: 33
End: 2018-10-08
Attending: PHYSICIAN ASSISTANT
Payer: COMMERCIAL

## 2018-10-08 ENCOUNTER — OFFICE VISIT (OUTPATIENT)
Dept: URGENT CARE | Facility: CLINIC | Age: 33
End: 2018-10-08
Payer: COMMERCIAL

## 2018-10-08 VITALS
BODY MASS INDEX: 18.87 KG/M2 | DIASTOLIC BLOOD PRESSURE: 60 MMHG | HEART RATE: 60 BPM | HEIGHT: 66 IN | RESPIRATION RATE: 12 BRPM | SYSTOLIC BLOOD PRESSURE: 100 MMHG | TEMPERATURE: 99.5 F | WEIGHT: 117.4 LBS | OXYGEN SATURATION: 100 %

## 2018-10-08 DIAGNOSIS — N76.0 ACUTE VAGINITIS: ICD-10-CM

## 2018-10-08 LAB
APPEARANCE UR: CLEAR
BILIRUB UR STRIP-MCNC: NORMAL MG/DL
COLOR UR AUTO: YELLOW
GLUCOSE UR STRIP.AUTO-MCNC: NORMAL MG/DL
INT CON NEG: NEGATIVE
INT CON POS: POSITIVE
KETONES UR STRIP.AUTO-MCNC: NORMAL MG/DL
LEUKOCYTE ESTERASE UR QL STRIP.AUTO: NORMAL
NITRITE UR QL STRIP.AUTO: NORMAL
PH UR STRIP.AUTO: 5.5 [PH] (ref 5–8)
POC URINE PREGNANCY TEST: NORMAL
PROT UR QL STRIP: NORMAL MG/DL
RBC UR QL AUTO: NORMAL
SP GR UR STRIP.AUTO: 1
UROBILINOGEN UR STRIP-MCNC: NORMAL MG/DL

## 2018-10-08 PROCEDURE — 87660 TRICHOMONAS VAGIN DIR PROBE: CPT

## 2018-10-08 PROCEDURE — 81025 URINE PREGNANCY TEST: CPT | Performed by: PHYSICIAN ASSISTANT

## 2018-10-08 PROCEDURE — 87510 GARDNER VAG DNA DIR PROBE: CPT

## 2018-10-08 PROCEDURE — 99214 OFFICE O/P EST MOD 30 MIN: CPT | Performed by: PHYSICIAN ASSISTANT

## 2018-10-08 PROCEDURE — 87480 CANDIDA DNA DIR PROBE: CPT

## 2018-10-08 PROCEDURE — 87491 CHLMYD TRACH DNA AMP PROBE: CPT

## 2018-10-08 PROCEDURE — 87591 N.GONORRHOEAE DNA AMP PROB: CPT

## 2018-10-08 PROCEDURE — 81002 URINALYSIS NONAUTO W/O SCOPE: CPT | Performed by: PHYSICIAN ASSISTANT

## 2018-10-08 RX ORDER — METRONIDAZOLE 500 MG/1
500 TABLET ORAL 2 TIMES DAILY
Qty: 14 TAB | Refills: 0 | Status: SHIPPED | OUTPATIENT
Start: 2018-10-08 | End: 2018-10-15

## 2018-10-08 ASSESSMENT — ENCOUNTER SYMPTOMS
COUGH: 0
SHORTNESS OF BREATH: 0
FEVER: 0
HEADACHES: 0
CHILLS: 0
VAGINITIS: 1
MYALGIAS: 0
NAUSEA: 0
FLANK PAIN: 0
PALPITATIONS: 0
BACK PAIN: 0
DIARRHEA: 0
ABDOMINAL PAIN: 0
VOMITING: 0

## 2018-10-09 LAB
C TRACH DNA SPEC QL NAA+PROBE: NEGATIVE
CANDIDA DNA VAG QL PROBE+SIG AMP: NEGATIVE
G VAGINALIS DNA VAG QL PROBE+SIG AMP: NEGATIVE
N GONORRHOEA DNA SPEC QL NAA+PROBE: NEGATIVE
SPECIMEN SOURCE: NORMAL
T VAGINALIS DNA VAG QL PROBE+SIG AMP: NEGATIVE

## 2018-10-09 NOTE — PROGRESS NOTES
Subjective:      Emili Awad is a 33 y.o. female who presents with Vaginal Discharge (white discharge and odor that began on friday )            Vaginitis   The patient's primary symptoms include genital itching, a genital odor (fishy smell ) and vaginal discharge. The patient's pertinent negatives include no genital rash, pelvic pain or vaginal bleeding. This is a new problem. Episode onset: 4 days  The problem occurs constantly. The problem has been unchanged. She is not pregnant. Pertinent negatives include no abdominal pain, back pain, chills, diarrhea, discolored urine, dysuria, fever, flank pain, frequency, headaches, hematuria, nausea, painful intercourse, rash, urgency or vomiting. The vaginal discharge was white, yellow, watery, malodorous and copious. There has been no bleeding. Nothing aggravates the symptoms. She has tried nothing for the symptoms. She is sexually active. No, her partner does not have an STD. She uses an IUD for contraception. There is no history of an STD or vaginosis.     Patient reports history of Candidiasis.  Current ymptoms are different.   Past Medical History:   Diagnosis Date   • Cardiac arrhythmia    • Chickenpox    • Eczema    • Episodic lightheadedness 6/23/2017   • H. pylori infection 2015    treated   • Hypokalemia    • IUD (intrauterine device) in place    • Needle stick injury     took medications, pt was tested negative       No past surgical history on file.    Family History   Problem Relation Age of Onset   • Diabetes Mother    • Hypertension Father    • Cancer Paternal Uncle         lymphoma       No Known Allergies    Medications, Allergies, and current problem list reviewed today in Epic      Review of Systems   Constitutional: Negative for chills, fever and malaise/fatigue.   Respiratory: Negative for cough and shortness of breath.    Cardiovascular: Negative for chest pain, palpitations and leg swelling.   Gastrointestinal: Negative for abdominal pain,  "diarrhea, nausea and vomiting.   Genitourinary: Positive for vaginal discharge. Negative for dysuria, flank pain, frequency, hematuria, pelvic pain and urgency.        Vaginal discharge, vaginal odor   Musculoskeletal: Negative for back pain and myalgias.   Skin: Negative for rash.   Neurological: Negative for headaches.     All other systems reviewed and are negative.          Objective:     /60   Pulse 60   Temp 37.5 °C (99.5 °F)   Resp 12   Ht 1.676 m (5' 6\")   Wt 53.3 kg (117 lb 6.4 oz)   SpO2 100%   Breastfeeding? No   BMI 18.95 kg/m²      Physical Exam   Constitutional: She is oriented to person, place, and time. She appears well-developed and well-nourished. No distress.   HENT:   Head: Normocephalic and atraumatic.   Eyes: Conjunctivae are normal.   Cardiovascular: Normal rate, regular rhythm and normal heart sounds.  Exam reveals no gallop and no friction rub.    No murmur heard.  Pulmonary/Chest: Effort normal and breath sounds normal. No respiratory distress. She has no wheezes. She has no rales.   Genitourinary: Uterus normal. There is no rash, tenderness or lesion on the right labia. There is no rash, tenderness or lesion on the left labia. Cervix exhibits discharge. Cervix exhibits no friability. Right adnexum displays no mass, no tenderness and no fullness. Left adnexum displays no mass, no tenderness and no fullness. No erythema, tenderness or bleeding in the vagina. No foreign body in the vagina. No signs of injury around the vagina. Vaginal discharge (copious yellow discharge noted. ) found.   Neurological: She is alert and oriented to person, place, and time.   Skin: Skin is warm and dry.   Psychiatric: She has a normal mood and affect. Her behavior is normal. Judgment and thought content normal.          Lab Results   Component Value Date/Time    POCCOLOR yellow 10/08/2018 06:14 PM    POCCOLOR Yellow 01/01/2015 02:41 PM    POCAPPEAR clear 10/08/2018 06:14 PM    POCAPPEAR Clear " 01/01/2015 02:41 PM    POCLEUKEST trace 10/08/2018 06:14 PM    POCLEUKEST Trace (A) 01/01/2015 02:41 PM    POCNITRITE neg 10/08/2018 06:14 PM    POCNITRITE Negative 01/01/2015 02:41 PM    POCUROBILIGE neg 10/08/2018 06:14 PM    POCPROTEIN neg 10/08/2018 06:14 PM    POCPROTEIN Negative 01/01/2015 02:41 PM    POCURPH 5.5 10/08/2018 06:14 PM    POCURPH 5.5 01/01/2015 02:41 PM    POCBLOOD neg 10/08/2018 06:14 PM    POCBLOOD Negative 01/01/2015 02:41 PM    POCSPGRV 1.005 10/08/2018 06:14 PM    POCSPGRV >=1.030 (A) 01/01/2015 02:41 PM    POCKETONES neg 10/08/2018 06:14 PM    POCKETONES Trace (A) 01/01/2015 02:41 PM    POCBILIRUBIN neg 10/08/2018 06:14 PM    POCGLUCUA neg 10/08/2018 06:14 PM    POCGLUCUA Negative 01/01/2015 02:41 PM        hcg- negative     Assessment/Plan:     1. Acute vaginitis  metroNIDAZOLE (FLAGYL) 500 MG Tab    VAGINAL PATHOGENS DNA PANEL    CHLAMYDIA/GC PCR URINE OR SWAB    POCT Urinalysis    POCT Pregnancy     Suspect BV based on Physical exam findings.    Current Outpatient Prescriptions:   •  metroNIDAZOLE (FLAGYL) 500 MG Tab, Take 1 Tab by mouth 2 times a day for 7 days., Disp: 14 Tab, Rfl: 0  •  Will check GC/Chlamydia and vaginal pathogens. Change tx plan according to results.     Differential diagnoses, Supportive care, and indications for immediate follow-up discussed with patient.   Instructed to return to clinic or nearest emergency department for any change in condition, further concerns, or worsening of symptoms.    The patient demonstrated a good understanding and agreed with the treatment plan.    Magali Molina P.A.-C.

## 2018-10-10 ENCOUNTER — HOME STUDY (OUTPATIENT)
Dept: SLEEP MEDICINE | Facility: MEDICAL CENTER | Age: 33
End: 2018-10-10
Attending: INTERNAL MEDICINE
Payer: COMMERCIAL

## 2018-10-10 DIAGNOSIS — G47.33 OSA (OBSTRUCTIVE SLEEP APNEA): ICD-10-CM

## 2018-10-10 PROCEDURE — 95806 SLEEP STUDY UNATT&RESP EFFT: CPT | Performed by: INTERNAL MEDICINE

## 2018-10-18 ENCOUNTER — SLEEP CENTER VISIT (OUTPATIENT)
Dept: SLEEP MEDICINE | Facility: MEDICAL CENTER | Age: 33
End: 2018-10-18
Payer: COMMERCIAL

## 2018-10-18 VITALS
HEART RATE: 39 BPM | HEIGHT: 66 IN | OXYGEN SATURATION: 97 % | SYSTOLIC BLOOD PRESSURE: 110 MMHG | BODY MASS INDEX: 18.32 KG/M2 | WEIGHT: 114 LBS | RESPIRATION RATE: 15 BRPM | DIASTOLIC BLOOD PRESSURE: 60 MMHG

## 2018-10-18 DIAGNOSIS — G47.20 SLEEP-WAKE CYCLE DISORDER: ICD-10-CM

## 2018-10-18 DIAGNOSIS — G47.10 HYPERSOMNOLENCE: ICD-10-CM

## 2018-10-18 PROCEDURE — 99214 OFFICE O/P EST MOD 30 MIN: CPT | Performed by: INTERNAL MEDICINE

## 2018-10-18 NOTE — PROGRESS NOTES
"CC:  Hypersomnolence, possible sleep apnea hypopnea syndrome.    HPI:   Ms. Awad was evaluated here in September 12 4 episodes in which she seems to \"feel faint and really tired at times especially after certain workouts.\" Continuous nocturnal oximetry in July demonstrated some arterial oxygen desaturation but review of the raw data suggested extensive artifact. The patient's sleep-wake cycle was reviewed and indicates that she may get about 6 hours of sleep on work nights and closer to 8-9 hours on days off. She awakens 3 times at night with nocturia. She did not describe much daytime somnolence and her [place] sleepiness score was 4 points. She returns today for evaluation of a home sleep test. She has previously been evaluated by cardiology for bradycardia.    The home sleep test on October 10, 2018 is reviewed with the patient. It demonstrates a respiratory event index of 0.8 events per hour and preservation of arterial oxygen saturation.        Patient Active Problem List    Diagnosis Date Noted   • Premature ventricular complex 09/15/2017   • Episodic lightheadedness 06/23/2017   • Secondary amenorrhea 01/13/2017   • BMI less than 19,adult 12/13/2016   • Abdominal discomfort 12/13/2016   • Abdominal bloating 12/13/2016   • Irregular menses 12/13/2016   • Health examination of defined subpopulation 11/01/2013       Past Medical History:   Diagnosis Date   • Cardiac arrhythmia    • Chickenpox    • Eczema    • Episodic lightheadedness 6/23/2017   • H. pylori infection 2015    treated   • Hypokalemia    • IUD (intrauterine device) in place    • Needle stick injury     took medications, pt was tested negative       No past surgical history on file.    Family History   Problem Relation Age of Onset   • Diabetes Mother    • Hypertension Father    • Cancer Paternal Uncle         lymphoma       Social History     Social History   • Marital status:      Spouse name: N/A   • Number of children: N/A   • Years of " "education: N/A     Occupational History   • mammography tech      Social History Main Topics   • Smoking status: Never Smoker   • Smokeless tobacco: Never Used   • Alcohol use 0.0 oz/week      Comment: occasional   • Drug use: No   • Sexual activity: Yes     Partners: Male     Birth control/ protection: IUD     Other Topics Concern   • Not on file     Social History Narrative    , no children.        Current Outpatient Prescriptions   Medication Sig Dispense Refill   • B Complex Vitamins (VITAMIN B COMPLEX PO) Take  by mouth.     • Ascorbic Acid (VITAMIN C PO) Take  by mouth.     • Cholecalciferol (VITAMIN D PO) Take 1 Tab by mouth every day.     • Multiple Vitamins-Minerals (MULTIVITAMIN PO) Take  by mouth.     • Probiotic Product (PROBIOTIC DAILY PO) Take  by mouth.       No current facility-administered medications for this visit.     \"CURRENT RX\"      Allergies: Patient has no known allergies.      ROS  Positive for the sleep and cardiac issues reviewed above and otherwise unchanged and negative per the CPT protocol.      Physical Exam:   /60 (BP Location: Left arm, Patient Position: Sitting, BP Cuff Size: Adult)   Pulse (!) 39   Resp 15   Ht 1.676 m (5' 6\")   Wt 51.7 kg (114 lb)   SpO2 97%   BMI 18.40 kg/m²    Head and neck examination demonstrates no mucosal lesion, purulent drainage or evident polyps. The pharynx is benign with a Mallampati I presentation. The neck is supple without thyromegaly. On chest examination there are symmetrical bilateral breath sounds without rales, wheezing or consolidation. On cardiac examination, the apical impulse and heart sounds are normal and the rhythm is regular. There is no murmur, gallop or rub and no jugular venous distention. The abdomen is soft with active bowel sounds and no palpable hepatosplenomegaly, mass, guarding or rebound. The extremities show no clubbing, cyanosis or edema and no signs of deep venous thrombosis. There is no warmth, redness, " tenderness or palpable venous cord in the calves. The skin is clear, warm and dry. There is no unusual peripheral lymphadenopathy. Peripheral pulses are palpable in all 4 extremities. On neurologic examination, cranial nerve function is intact, motor tone is symmetrical, and the patient is alert, oriented and responsive.       Problems:  1. Hypersomnolence  She doesn't really describe daytime somnolence but does describe some episodes of lightheadedness and fatigue, particularly after strenuous workouts. She believes that this may be related to her chronic bradycardia and relatively low blood pressure and I suspect that she may be right, particularly if there is an element of dehydration and hypovolemia after extensive exercise. The home sleep test did not suggest sleep disordered breathing with a normal respiratory event index and preservation of arterial oxygen saturation. We have talked about the possibility that the home sleep test may not identify mild sleep-disordered breathing since it doesn't distinguish sleep from wake time and talked about the possibility of a formal polysomnogram but given the low clinical probability for sleep apnea hypopnea syndrome I don't think that study is indicated.    2. Sleep-wake cycle disorder  Characterized by short time in bed on work days.      Plan:   1. No treatment for sleep-disordered breathing is required at this time.    2. We reviewed the principles of sleep hygiene with particular attention to regular bedtime and awakening time as well as the goal of at least 7.5 hours of sleep nightly.    3. Return visit here as needed and follow-up in 's office as scheduled.    We appreciate Returning to assist in her care.

## 2018-10-18 NOTE — PROCEDURES
Ms. Awad presented with excessive daytime somnolence. She does not have medical or sleep diagnoses that would contraindicate a home sleep test.    480 minutes of data are available for review and the information appears to be of reasonable quality for analysis.    The respiratory event index is normal at 0.8 events per hour. Arterial oxygen saturation is well maintained with a mean of 94% and a transient minimum of 89%. The heart rate varies from 31-54 bpm.    Assessment:  The normal respiratory event index of 0.8 events per hour and the preservation of arterial oxygen saturation do not suggest significant sleep-disordered breathing. The home sleep test does not distinguish sleep from wake time may underestimate the severity of sleep-disordered breathing. Some bradycardia is noted in this study.    Recommendations:  Clinical correlation is required. Specific treatment may not be needed based on the results of this study. If the clinical probability of obstructive sleep apnea hypopnea is high a formal polysomnogram is suggested.

## 2019-03-21 ENCOUNTER — TELEPHONE (OUTPATIENT)
Dept: SCHEDULING | Facility: IMAGING CENTER | Age: 34
End: 2019-03-21

## 2019-05-15 ENCOUNTER — TELEPHONE (OUTPATIENT)
Dept: MEDICAL GROUP | Facility: LAB | Age: 34
End: 2019-05-15

## 2019-05-15 NOTE — TELEPHONE ENCOUNTER
NEW PATIENT VISIT PRE-VISIT PLANNING    1.  [employer] Patient is checked in Patient Demographics? YES    2.  Immunizations were updated in Epic using WebIZ?: Epic matches WebIZ       •  Web Iz Recommendations: Patient is up to date on all vaccines    3.  Is this appointment scheduled as a Hospital Follow-Up? No    4.  Patient is due for the following Health Maintenance Topics:   There are no preventive care reminders to display for this patient.        5. Orders for overdue Health Maintenance topics pended in Pre-Charting? N\A    6.  Reviewed/Updated the following with patient:   •   Preferred Pharmacy? NO       •   Preferred Lab? NO       •   Preferred Communication? NO       •   Allergies? NO       •   Medications? NO       •   Social History? NO       •   Family History (document living status of immediate family members and if + hx of cancer, diabetes, hypertension, hyperlipidemia, heart attack, stroke) NO    7.  Updated Care Team?       •   DME Company (gait device, O2, CPAP, etc.) NO       •   Other Specialists (eye doctor, derm, GYN, cardiology, endo, etc): NO    8.  Patient was informed to arrive 15 min prior to their   scheduled appointment and bring in their medication bottles.

## 2019-05-23 ENCOUNTER — HOSPITAL ENCOUNTER (OUTPATIENT)
Dept: LAB | Facility: MEDICAL CENTER | Age: 34
End: 2019-05-23
Attending: NURSE PRACTITIONER
Payer: COMMERCIAL

## 2019-05-23 ENCOUNTER — OFFICE VISIT (OUTPATIENT)
Dept: MEDICAL GROUP | Facility: LAB | Age: 34
End: 2019-05-23
Payer: COMMERCIAL

## 2019-05-23 VITALS
DIASTOLIC BLOOD PRESSURE: 60 MMHG | OXYGEN SATURATION: 100 % | HEIGHT: 66 IN | HEART RATE: 51 BPM | SYSTOLIC BLOOD PRESSURE: 104 MMHG | TEMPERATURE: 97.6 F | BODY MASS INDEX: 17.39 KG/M2 | WEIGHT: 108.2 LBS

## 2019-05-23 DIAGNOSIS — Z00.00 WELL ADULT EXAM: ICD-10-CM

## 2019-05-23 DIAGNOSIS — R00.1 BRADYCARDIA: ICD-10-CM

## 2019-05-23 DIAGNOSIS — E55.9 VITAMIN D DEFICIENCY: ICD-10-CM

## 2019-05-23 LAB
25(OH)D3 SERPL-MCNC: 33 NG/ML (ref 30–100)
ALBUMIN SERPL BCP-MCNC: 4.6 G/DL (ref 3.2–4.9)
ALBUMIN/GLOB SERPL: 1.7 G/DL
ALP SERPL-CCNC: 65 U/L (ref 30–99)
ALT SERPL-CCNC: 17 U/L (ref 2–50)
ANION GAP SERPL CALC-SCNC: 7 MMOL/L (ref 0–11.9)
AST SERPL-CCNC: 21 U/L (ref 12–45)
BASOPHILS # BLD AUTO: 0.9 % (ref 0–1.8)
BASOPHILS # BLD: 0.03 K/UL (ref 0–0.12)
BILIRUB SERPL-MCNC: 0.8 MG/DL (ref 0.1–1.5)
BUN SERPL-MCNC: 12 MG/DL (ref 8–22)
CALCIUM SERPL-MCNC: 9.3 MG/DL (ref 8.5–10.5)
CHLORIDE SERPL-SCNC: 109 MMOL/L (ref 96–112)
CO2 SERPL-SCNC: 27 MMOL/L (ref 20–33)
CREAT SERPL-MCNC: 0.81 MG/DL (ref 0.5–1.4)
EOSINOPHIL # BLD AUTO: 0.01 K/UL (ref 0–0.51)
EOSINOPHIL NFR BLD: 0.3 % (ref 0–6.9)
ERYTHROCYTE [DISTWIDTH] IN BLOOD BY AUTOMATED COUNT: 45.7 FL (ref 35.9–50)
FASTING STATUS PATIENT QL REPORTED: NORMAL
GLOBULIN SER CALC-MCNC: 2.7 G/DL (ref 1.9–3.5)
GLUCOSE SERPL-MCNC: 76 MG/DL (ref 65–99)
HCT VFR BLD AUTO: 41.8 % (ref 37–47)
HGB BLD-MCNC: 14.6 G/DL (ref 12–16)
IMM GRANULOCYTES # BLD AUTO: 0 K/UL (ref 0–0.11)
IMM GRANULOCYTES NFR BLD AUTO: 0 % (ref 0–0.9)
LYMPHOCYTES # BLD AUTO: 1.22 K/UL (ref 1–4.8)
LYMPHOCYTES NFR BLD: 35.4 % (ref 22–41)
MCH RBC QN AUTO: 34.8 PG (ref 27–33)
MCHC RBC AUTO-ENTMCNC: 34.9 G/DL (ref 33.6–35)
MCV RBC AUTO: 99.5 FL (ref 81.4–97.8)
MONOCYTES # BLD AUTO: 0.32 K/UL (ref 0–0.85)
MONOCYTES NFR BLD AUTO: 9.3 % (ref 0–13.4)
NEUTROPHILS # BLD AUTO: 1.87 K/UL (ref 2–7.15)
NEUTROPHILS NFR BLD: 54.1 % (ref 44–72)
NRBC # BLD AUTO: 0 K/UL
NRBC BLD-RTO: 0 /100 WBC
PLATELET # BLD AUTO: 200 K/UL (ref 164–446)
PMV BLD AUTO: 11.4 FL (ref 9–12.9)
POTASSIUM SERPL-SCNC: 4.1 MMOL/L (ref 3.6–5.5)
PROT SERPL-MCNC: 7.3 G/DL (ref 6–8.2)
RBC # BLD AUTO: 4.2 M/UL (ref 4.2–5.4)
SODIUM SERPL-SCNC: 143 MMOL/L (ref 135–145)
WBC # BLD AUTO: 3.5 K/UL (ref 4.8–10.8)

## 2019-05-23 PROCEDURE — 82306 VITAMIN D 25 HYDROXY: CPT

## 2019-05-23 PROCEDURE — 36415 COLL VENOUS BLD VENIPUNCTURE: CPT

## 2019-05-23 PROCEDURE — 99395 PREV VISIT EST AGE 18-39: CPT | Performed by: NURSE PRACTITIONER

## 2019-05-23 PROCEDURE — 80053 COMPREHEN METABOLIC PANEL: CPT

## 2019-05-23 PROCEDURE — 85025 COMPLETE CBC W/AUTO DIFF WBC: CPT

## 2019-06-23 ENCOUNTER — OFFICE VISIT (OUTPATIENT)
Dept: URGENT CARE | Facility: CLINIC | Age: 34
End: 2019-06-23
Payer: COMMERCIAL

## 2019-06-23 VITALS
HEART RATE: 60 BPM | DIASTOLIC BLOOD PRESSURE: 68 MMHG | TEMPERATURE: 98.9 F | HEIGHT: 66 IN | SYSTOLIC BLOOD PRESSURE: 100 MMHG | OXYGEN SATURATION: 95 % | WEIGHT: 108 LBS | BODY MASS INDEX: 17.36 KG/M2 | RESPIRATION RATE: 18 BRPM

## 2019-06-23 DIAGNOSIS — R30.0 DYSURIA: ICD-10-CM

## 2019-06-23 LAB
APPEARANCE UR: NORMAL
BILIRUB UR STRIP-MCNC: NORMAL MG/DL
COLOR UR AUTO: YELLOW
GLUCOSE UR STRIP.AUTO-MCNC: NORMAL MG/DL
INT CON NEG: NORMAL
INT CON POS: NORMAL
KETONES UR STRIP.AUTO-MCNC: NORMAL MG/DL
LEUKOCYTE ESTERASE UR QL STRIP.AUTO: NORMAL
NITRITE UR QL STRIP.AUTO: NORMAL
PH UR STRIP.AUTO: 8 [PH] (ref 5–8)
POC URINE PREGNANCY TEST: NEGATIVE
PROT UR QL STRIP: 100 MG/DL
RBC UR QL AUTO: NORMAL
SP GR UR STRIP.AUTO: 1.01
UROBILINOGEN UR STRIP-MCNC: 0.2 MG/DL

## 2019-06-23 PROCEDURE — 81025 URINE PREGNANCY TEST: CPT | Performed by: FAMILY MEDICINE

## 2019-06-23 PROCEDURE — 81002 URINALYSIS NONAUTO W/O SCOPE: CPT | Performed by: FAMILY MEDICINE

## 2019-06-23 PROCEDURE — 99214 OFFICE O/P EST MOD 30 MIN: CPT | Performed by: FAMILY MEDICINE

## 2019-06-23 RX ORDER — PHENAZOPYRIDINE HYDROCHLORIDE 200 MG/1
200 TABLET, FILM COATED ORAL 3 TIMES DAILY
Qty: 6 TAB | Refills: 0 | Status: SHIPPED | OUTPATIENT
Start: 2019-06-23 | End: 2019-06-25

## 2019-06-23 RX ORDER — NITROFURANTOIN 25; 75 MG/1; MG/1
100 CAPSULE ORAL EVERY 12 HOURS
Qty: 10 CAP | Refills: 0 | Status: SHIPPED | OUTPATIENT
Start: 2019-06-23 | End: 2019-06-28

## 2019-06-23 NOTE — PROGRESS NOTES
"Subjective:      Emili Awad is a 34 y.o. female who presents with Dysuria (Since yesterdya urinary discomfort and lower abdominal pressure)      - This is a pleasant and non toxic appearing 34 y.o. female with c/o freq/burn and suprapubic pressure x 1 day          ALLERGIES:  Patient has no known allergies.     PMH:  Past Medical History:   Diagnosis Date   • Cardiac arrhythmia    • Chickenpox    • Eczema    • Episodic lightheadedness 6/23/2017   • H. pylori infection 2015    treated   • Hypokalemia    • IUD (intrauterine device) in place    • Needle stick injury     took medications, pt was tested negative        PSH:  History reviewed. No pertinent surgical history.    MEDS:    Current Outpatient Prescriptions:   •  nitrofurantoin monohyd macro (MACROBID) 100 MG Cap, Take 1 Cap by mouth every 12 hours for 5 days., Disp: 10 Cap, Rfl: 0  •  phenazopyridine (PYRIDIUM) 200 MG Tab, Take 1 Tab by mouth 3 times a day for 2 days., Disp: 6 Tab, Rfl: 0  •  B Complex Vitamins (VITAMIN B COMPLEX PO), Take  by mouth., Disp: , Rfl:   •  Ascorbic Acid (VITAMIN C PO), Take  by mouth., Disp: , Rfl:   •  Cholecalciferol (VITAMIN D PO), Take 1 Tab by mouth every day., Disp: , Rfl:   •  Multiple Vitamins-Minerals (MULTIVITAMIN PO), Take  by mouth., Disp: , Rfl:   •  Probiotic Product (PROBIOTIC DAILY PO), Take  by mouth., Disp: , Rfl:     ** I have documented what I find to be significant in regards to past medical, social, family and surgical history  in my HPI or under PMH/PSH/FH review section, otherwise it is contributory **         HPI    Review of Systems   Genitourinary: Positive for dysuria, frequency and hematuria.   All other systems reviewed and are negative.         Objective:     /68   Pulse 60   Temp 37.2 °C (98.9 °F) (Temporal)   Resp 18   Ht 1.676 m (5' 6\")   Wt 49 kg (108 lb)   SpO2 95%   BMI 17.43 kg/m²      Physical Exam   Constitutional: She appears well-developed. No distress.   HENT:   Head: " Normocephalic and atraumatic.   Neck: Neck supple.   Cardiovascular: Regular rhythm.    No murmur heard.  Pulmonary/Chest: Effort normal. No respiratory distress.   Abdominal: Soft. There is no tenderness.   Neurological: She is alert. She exhibits normal muscle tone.   Skin: Skin is warm and dry.   Psychiatric: She has a normal mood and affect. Judgment normal.   Nursing note and vitals reviewed.              Assessment/Plan:         1. Dysuria  POCT Pregnancy    POCT Urinalysis    nitrofurantoin monohyd macro (MACROBID) 100 MG Cap    phenazopyridine (PYRIDIUM) 200 MG Tab             Dx & d/c instructions discussed w/ patient and/or family members.     Follow up with PCP (or here if PCP unavailable) in 2-3 days if symptoms not improving, ER if feeling/getting worse.    Any realistic and/or common medication side effects that may have been given today(i.e. Rash, GI upset/constipation, sedation, elevation of BP or blood sugars) reviewed.     Patient left in stable condition

## 2019-06-25 RX ORDER — CICLOPIROX 80 MG/ML
SOLUTION TOPICAL
Qty: 6.6 ML | Refills: 1 | Status: SHIPPED
Start: 2019-06-25 | End: 2020-04-27

## 2019-06-30 ENCOUNTER — OFFICE VISIT (OUTPATIENT)
Dept: URGENT CARE | Facility: CLINIC | Age: 34
End: 2019-06-30
Payer: COMMERCIAL

## 2019-06-30 ENCOUNTER — HOSPITAL ENCOUNTER (OUTPATIENT)
Facility: MEDICAL CENTER | Age: 34
End: 2019-06-30
Attending: PHYSICIAN ASSISTANT
Payer: COMMERCIAL

## 2019-06-30 VITALS
OXYGEN SATURATION: 98 % | WEIGHT: 109 LBS | RESPIRATION RATE: 16 BRPM | TEMPERATURE: 97.3 F | SYSTOLIC BLOOD PRESSURE: 110 MMHG | HEART RATE: 48 BPM | HEIGHT: 66 IN | BODY MASS INDEX: 17.52 KG/M2 | DIASTOLIC BLOOD PRESSURE: 72 MMHG

## 2019-06-30 DIAGNOSIS — N39.0 RECURRENT UTI: ICD-10-CM

## 2019-06-30 LAB
APPEARANCE UR: NORMAL
BILIRUB UR STRIP-MCNC: NORMAL MG/DL
COLOR UR AUTO: NORMAL
GLUCOSE UR STRIP.AUTO-MCNC: NORMAL MG/DL
KETONES UR STRIP.AUTO-MCNC: NORMAL MG/DL
LEUKOCYTE ESTERASE UR QL STRIP.AUTO: NORMAL
NITRITE UR QL STRIP.AUTO: NORMAL
PH UR STRIP.AUTO: 6.5 [PH] (ref 5–8)
PROT UR QL STRIP: NORMAL MG/DL
RBC UR QL AUTO: NORMAL
SP GR UR STRIP.AUTO: 1.01
UROBILINOGEN UR STRIP-MCNC: 0.2 MG/DL

## 2019-06-30 PROCEDURE — 81002 URINALYSIS NONAUTO W/O SCOPE: CPT | Performed by: PHYSICIAN ASSISTANT

## 2019-06-30 PROCEDURE — 99214 OFFICE O/P EST MOD 30 MIN: CPT | Performed by: PHYSICIAN ASSISTANT

## 2019-06-30 PROCEDURE — 87086 URINE CULTURE/COLONY COUNT: CPT

## 2019-06-30 RX ORDER — SULFAMETHOXAZOLE AND TRIMETHOPRIM 800; 160 MG/1; MG/1
1 TABLET ORAL EVERY 12 HOURS
Qty: 6 TAB | Refills: 0 | Status: SHIPPED | OUTPATIENT
Start: 2019-06-30 | End: 2019-07-03

## 2019-06-30 RX ORDER — PHENAZOPYRIDINE HYDROCHLORIDE 200 MG/1
200 TABLET, FILM COATED ORAL 3 TIMES DAILY
Qty: 6 TAB | Refills: 0 | Status: SHIPPED | OUTPATIENT
Start: 2019-06-30 | End: 2019-07-02

## 2019-06-30 ASSESSMENT — ENCOUNTER SYMPTOMS
DIZZINESS: 0
CHILLS: 0
PALPITATIONS: 0
SHORTNESS OF BREATH: 0
BLURRED VISION: 0
ABDOMINAL PAIN: 1
FEVER: 0
FLANK PAIN: 0

## 2019-06-30 NOTE — PROGRESS NOTES
"  Subjective:   Emili Peterson a 34 y.o. female who presents for Dysuria (Today urinary pain .)    HPI  ROS   Objective:   /72   Pulse (!) 48   Temp 36.3 °C (97.3 °F) (Temporal)   Resp 16   Ht 1.676 m (5' 6\")   Wt 49.4 kg (109 lb)   SpO2 98%   BMI 17.59 kg/m²   Physical Exam  Assessment/Plan:   1. Dysuria  - POCT Urinalysis  - Urine Culture [[id number]]; Future  - sulfamethoxazole-trimethoprim (BACTRIM DS) 800-160 MG tablet; Take 1 Tab by mouth every 12 hours for 3 days.  Dispense: 6 Tab; Refill: 0  - phenazopyridine (PYRIDIUM) 200 MG Tab; Take 1 Tab by mouth 3 times a day for 2 days.  Dispense: 6 Tab; Refill: 0    ***  Differential diagnosis, natural history, supportive care, and indications for immediate follow-up discussed.  Return if symptoms worsen or fail to improve.  "

## 2019-06-30 NOTE — PROGRESS NOTES
Subjective:      Emili Awad is a 34 y.o. female who presents with Dysuria (Today urinary pain .)      HPI:  Patient was seen on 6/23/2019 for evaluation of dysuria.  At that time she was diagnosed with a urinary tract infection and was prescribed a 5-day course of Macrobid.  She says symptoms went away approximately 2 days after starting the antibiotic.  She said this morning, however, she started to have burning, frequency, and urgency again.  She is also noticed some lower abdominal pressure today.  She denies fever/chills, flank pain, or nausea/vomiting.        Review of Systems   Constitutional: Negative for chills, fever and malaise/fatigue.   Eyes: Negative for blurred vision.   Respiratory: Negative for shortness of breath.    Cardiovascular: Negative for chest pain and palpitations.   Gastrointestinal: Positive for abdominal pain.   Genitourinary: Positive for dysuria, frequency and urgency. Negative for flank pain.   Neurological: Negative for dizziness.       PMH:  has a past medical history of Cardiac arrhythmia; Chickenpox; Eczema; Episodic lightheadedness (6/23/2017); H. pylori infection (2015); Hypokalemia; IUD (intrauterine device) in place; and Needle stick injury.  MEDS:   Current Outpatient Prescriptions:   •  sulfamethoxazole-trimethoprim (BACTRIM DS) 800-160 MG tablet, Take 1 Tab by mouth every 12 hours for 3 days., Disp: 6 Tab, Rfl: 0  •  phenazopyridine (PYRIDIUM) 200 MG Tab, Take 1 Tab by mouth 3 times a day for 2 days., Disp: 6 Tab, Rfl: 0  •  ciclopirox (PENLAC) 8 % solution, Apply twice daily to affected nails, Disp: 6.6 mL, Rfl: 1  •  B Complex Vitamins (VITAMIN B COMPLEX PO), Take  by mouth., Disp: , Rfl:   •  Ascorbic Acid (VITAMIN C PO), Take  by mouth., Disp: , Rfl:   •  Cholecalciferol (VITAMIN D PO), Take 1 Tab by mouth every day., Disp: , Rfl:   •  Multiple Vitamins-Minerals (MULTIVITAMIN PO), Take  by mouth., Disp: , Rfl:   •  Probiotic Product (PROBIOTIC DAILY PO), Take  by  "mouth., Disp: , Rfl:   ALLERGIES: No Known Allergies  SURGHX: No past surgical history on file.  SOCHX:  reports that she has never smoked. She has never used smokeless tobacco. She reports that she drinks alcohol. She reports that she does not use drugs.  FH: Family history was reviewed, no pertinent findings to report     Objective:     /72   Pulse (!) 48   Temp 36.3 °C (97.3 °F) (Temporal)   Resp 16   Ht 1.676 m (5' 6\")   Wt 49.4 kg (109 lb)   SpO2 98%   BMI 17.59 kg/m²      Physical Exam   Constitutional: She is oriented to person, place, and time. She appears well-developed and well-nourished.   HENT:   Head: Normocephalic and atraumatic.   Right Ear: External ear normal.   Left Ear: External ear normal.   Eyes: Pupils are equal, round, and reactive to light. Conjunctivae are normal.   Cardiovascular: Normal rate and regular rhythm.    No murmur heard.  Pulmonary/Chest: Effort normal and breath sounds normal.   Abdominal: Soft. Normal appearance. There is no tenderness. There is no CVA tenderness.   Neurological: She is alert and oriented to person, place, and time.   Skin: Skin is warm and dry. Capillary refill takes less than 2 seconds.   Psychiatric: She has a normal mood and affect. Her behavior is normal. Judgment normal.       POCT Urinalysis - Large RBCs, large leuks    Assessment/Plan:     1. Recurrent UTI  - POCT Urinalysis  - Urine Culture [[id number]]; Future  - sulfamethoxazole-trimethoprim (BACTRIM DS) 800-160 MG tablet; Take 1 Tab by mouth every 12 hours for 3 days.  Dispense: 6 Tab; Refill: 0  - phenazopyridine (PYRIDIUM) 200 MG Tab; Take 1 Tab by mouth 3 times a day for 2 days.  Dispense: 6 Tab; Refill: 0    Urine was not cultured last time.  Due to the fact that she has gotten a recurrent UTI will send urine for culture today.      Differential Diagnosis, natural history, and supportive care discussed. Return to the Urgent Care or follow up with your PCP if symptoms fail to " resolve, or for any new or worsening symptoms. Emergency room precautions discussed. Patient and/or family appears understanding of information.

## 2019-07-03 LAB
BACTERIA UR CULT: NORMAL
SIGNIFICANT IND 70042: NORMAL
SITE SITE: NORMAL
SOURCE SOURCE: NORMAL

## 2019-08-14 ENCOUNTER — HOSPITAL ENCOUNTER (OUTPATIENT)
Dept: LAB | Facility: MEDICAL CENTER | Age: 34
End: 2019-08-14
Payer: COMMERCIAL

## 2019-08-14 LAB
BDY FAT % MEASURED: 11.3 %
BP DIAS: 58 MMHG
BP SYS: 88 MMHG
CHOLEST SERPL-MCNC: 193 MG/DL (ref 100–199)
DIABETES HTDIA: NO
EVENT NAME HTEVT: NORMAL
FASTING HTFAS: YES
GLUCOSE SERPL-MCNC: 64 MG/DL (ref 65–99)
HDLC SERPL-MCNC: 87 MG/DL
HYPERTENSION HTHYP: NO
LDLC SERPL CALC-MCNC: 89 MG/DL
SCREENING LOC CITY HTCIT: NORMAL
SCREENING LOC STATE HTSTA: NORMAL
SCREENING LOCATION HTLOC: NORMAL
SMOKING HTSMO: NO
SUBSCRIBER ID HTSID: NORMAL
TRIGL SERPL-MCNC: 86 MG/DL (ref 0–149)

## 2019-08-14 PROCEDURE — 82947 ASSAY GLUCOSE BLOOD QUANT: CPT

## 2019-08-14 PROCEDURE — 36415 COLL VENOUS BLD VENIPUNCTURE: CPT

## 2019-08-14 PROCEDURE — 80061 LIPID PANEL: CPT

## 2019-08-14 PROCEDURE — S5190 WELLNESS ASSESSMENT BY NONPH: HCPCS

## 2020-03-19 ENCOUNTER — EH NON-PROVIDER (OUTPATIENT)
Dept: OCCUPATIONAL MEDICINE | Facility: CLINIC | Age: 35
End: 2020-03-19

## 2020-03-19 DIAGNOSIS — Z02.89 ENCOUNTER FOR OCCUPATIONAL HEALTH EXAMINATION INVOLVING RESPIRATOR: Primary | ICD-10-CM

## 2020-03-19 PROCEDURE — 94375 RESPIRATORY FLOW VOLUME LOOP: CPT | Performed by: FAMILY MEDICINE

## 2020-04-27 ENCOUNTER — OFFICE VISIT (OUTPATIENT)
Dept: MEDICAL GROUP | Facility: MEDICAL CENTER | Age: 35
End: 2020-04-27
Payer: COMMERCIAL

## 2020-04-27 VITALS
SYSTOLIC BLOOD PRESSURE: 106 MMHG | WEIGHT: 112.43 LBS | TEMPERATURE: 98.9 F | RESPIRATION RATE: 14 BRPM | OXYGEN SATURATION: 98 % | HEIGHT: 66 IN | HEART RATE: 62 BPM | DIASTOLIC BLOOD PRESSURE: 68 MMHG | BODY MASS INDEX: 18.07 KG/M2

## 2020-04-27 DIAGNOSIS — N91.1 SECONDARY AMENORRHEA: ICD-10-CM

## 2020-04-27 DIAGNOSIS — E55.9 VITAMIN D DEFICIENCY: ICD-10-CM

## 2020-04-27 DIAGNOSIS — Z76.89 ENCOUNTER TO ESTABLISH CARE: ICD-10-CM

## 2020-04-27 DIAGNOSIS — I49.3 PREMATURE VENTRICULAR COMPLEX: ICD-10-CM

## 2020-04-27 PROBLEM — R42 EPISODIC LIGHTHEADEDNESS: Status: RESOLVED | Noted: 2017-06-23 | Resolved: 2020-04-27

## 2020-04-27 PROCEDURE — 99214 OFFICE O/P EST MOD 30 MIN: CPT | Performed by: FAMILY MEDICINE

## 2020-04-27 ASSESSMENT — FIBROSIS 4 INDEX: FIB4 SCORE: 0.84

## 2020-04-27 ASSESSMENT — ENCOUNTER SYMPTOMS
EYE REDNESS: 0
NAUSEA: 0
CONSTIPATION: 0
DEPRESSION: 0
CHILLS: 0
HEADACHES: 0
NERVOUS/ANXIOUS: 0
ABDOMINAL PAIN: 0
COUGH: 0
DIARRHEA: 0
SENSORY CHANGE: 0
FEVER: 0
HEMOPTYSIS: 0
EYE DISCHARGE: 0
FOCAL WEAKNESS: 0
DIZZINESS: 0
WHEEZING: 0
SINUS PAIN: 0
SPUTUM PRODUCTION: 0
WEIGHT LOSS: 0
MYALGIAS: 0
SHORTNESS OF BREATH: 0
EYE PAIN: 0
VOMITING: 0
PALPITATIONS: 0

## 2020-04-27 ASSESSMENT — PATIENT HEALTH QUESTIONNAIRE - PHQ9: CLINICAL INTERPRETATION OF PHQ2 SCORE: 0

## 2020-04-27 ASSESSMENT — LIFESTYLE VARIABLES: SUBSTANCE_ABUSE: 0

## 2020-04-27 NOTE — ASSESSMENT & PLAN NOTE
Patient did followed up previously with cardiology for bradycardia and did have an echocardiogram and work-up which came back negative.  Currently patient denies any dizziness, chest pain, palpitations, shortness of breath, lower extremity swelling.

## 2020-04-27 NOTE — PROGRESS NOTES
FAMILY MEDICINE VISIT                                                               Chief complaint::Diagnoses of Encounter to establish care, Vitamin D deficiency, Premature ventricular complex, Secondary amenorrhea, and BMI less than 19,adult were pertinent to this visit.    History of present illness: Emili Awad is a 35 y.o. female who presented to establish care.    BMI less than 19,adult  Patient reports that she eats very healthy diet, she does eat breakfast, lunch, dinner.  When she is working she eats breakfast bars.  She does workout almost every day.    Secondary amenorrhea  Patient reports that she did follow-up with OB/GYN last year when she had her Pap smear.  She has ParaGard IUD placed 5 years ago.  She reports that she initially she was having heavy menstrual cycles and then since last year she did not have it..  She did have blood work TSH, LH, FSH, prolactin and testosterone checked which came back negative.  She reports that she does have follow-up appointment with OB/GYN for evaluation.    Premature ventricular complex  Patient did followed up previously with cardiology for bradycardia and did have an echocardiogram and work-up which came back negative.  Currently patient denies any dizziness, chest pain, palpitations, shortness of breath, lower extremity swelling.    Vitamin D deficiency  This is a chronic problem for this patient.  Her last vitamin D level was 33.  Patient is taking vitamin D tablets daily.      Review of systems:     Review of Systems   Constitutional: Negative for chills, fever, malaise/fatigue and weight loss.   HENT: Negative for ear discharge, ear pain, hearing loss and sinus pain.    Eyes: Negative for pain, discharge and redness.   Respiratory: Negative for cough, hemoptysis, sputum production, shortness of breath and wheezing.    Cardiovascular: Negative for chest pain, palpitations and leg swelling.   Gastrointestinal: Negative for abdominal pain, constipation,  diarrhea, nausea and vomiting.   Genitourinary: Negative for dysuria, frequency and urgency.        Secondary amenorrhea   Musculoskeletal: Negative for joint pain and myalgias.   Skin: Negative for itching and rash.   Neurological: Negative for dizziness, sensory change, focal weakness and headaches.   Endo/Heme/Allergies: Negative for environmental allergies.   Psychiatric/Behavioral: Negative for depression, substance abuse and suicidal ideas. The patient is not nervous/anxious.         Past Medical, Surgical and Family History:    Past Medical History:   Diagnosis Date   • Cardiac arrhythmia    • Chickenpox    • Eczema    • Episodic lightheadedness 2017   • H. pylori infection     treated   • Hypokalemia    • IUD (intrauterine device) in place    • Needle stick injury     took medications, pt was tested negative     Past Surgical History:   Procedure Laterality Date   • DENTAL SURGERY      wisdom teeth extraction   • DILATION AND CURETTAGE      D & C, election  in      Family History   Problem Relation Age of Onset   • Diabetes Mother    • Hypertension Father    • Cancer Paternal Uncle         lymphoma        Social History:    Social History     Tobacco Use   • Smoking status: Never Smoker   • Smokeless tobacco: Never Used   Substance Use Topics   • Alcohol use: Yes     Alcohol/week: 0.0 oz     Comment: occasional   • Drug use: No        Medications and Allergies:     Current Outpatient Medications   Medication Sig Dispense Refill   • Ascorbic Acid (VITAMIN C PO) Take  by mouth.     • Cholecalciferol (VITAMIN D PO) Take 1 Tab by mouth every day.     • Multiple Vitamins-Minerals (MULTIVITAMIN PO) Take  by mouth.     • Probiotic Product (PROBIOTIC DAILY PO) Take  by mouth.       No current facility-administered medications for this visit.         No Known Allergies    Vitals:    /68 (BP Location: Left arm, Patient Position: Sitting, BP Cuff Size: Adult)   Pulse 62   Temp 37.2 °C (98.9  "°F)   Resp 14   Ht 1.676 m (5' 6\")   Wt 51 kg (112 lb 7 oz)   SpO2 98%  Body mass index is 18.15 kg/m².    Physical Exam:     Physical Exam   Constitutional: She is well-developed, well-nourished, and in no distress. No distress.   HENT:   Head: Normocephalic and atraumatic.   Right Ear: External ear normal.   Left Ear: External ear normal.   Nose: Nose normal.   Eyes: Conjunctivae and EOM are normal. Right eye exhibits no discharge. Left eye exhibits no discharge.   Neck: Neck supple.   Cardiovascular: Normal rate, regular rhythm and intact distal pulses. Exam reveals no gallop.   No murmur heard.  Pulmonary/Chest: Effort normal and breath sounds normal. No respiratory distress. She has no wheezes. She has no rales.   Abdominal: Soft. Bowel sounds are normal. She exhibits no distension and no mass. There is no abdominal tenderness. There is no guarding.   Musculoskeletal:         General: No deformity or edema.   Neurological: She is alert. Gait normal.   Skin: Skin is warm. No rash noted. She is not diaphoretic.   Psychiatric: Mood, affect and judgment normal.          Assessment/Plan:    Emili was seen today for establish care.    Diagnoses and all orders for this visit:    Encounter to establish care    Vitamin D deficiency:  · Last vitamin D level was 33, continue vitamin D thousand international units daily.    Premature ventricular complex:  · Currently asymptomatic, will continue to monitor.    Secondary amenorrhea:  · Discussed with patient that copper IUD can cause oligomenorrhea, but cases are very less usually copper IUD causes heavy menstrual cycles.  We reviewed package insert of copper IUD together.  Her lower BMI may be because of secondary membrane amenorrhea.  Discussed with patient about healthy BMI, advised to eat healthy diet with more calories to optimize her BMI.  · Patient will follow-up with OB/GYN regarding this.  · Patient did not have HPV vaccine previously, discussed with patient about " recent recommendation about shared decision making from age 29 to 47 year.  Given pamphlet of HPV vaccine to patient.  Patient reports that she will review pamphlet and will discuss at next visit again.    BMI less than 19,adult:  Check below blood work.  Advised to optimize her BMI having more calories in diet, frequent meals, and continue physical activity.    -     CBC WITH DIFFERENTIAL; Future  -     Comp Metabolic Panel; Future  -     Lipid Profile; Future  -     TSH WITH REFLEX TO FT4; Future         Please note that this dictation was created using voice recognition software. I have made every reasonable attempt to correct obvious errors, but I expect that there are errors of grammar and possibly content that I did not discover before finalizing the note.    Follow up in 3 months for follow-up for labs.

## 2020-04-27 NOTE — ASSESSMENT & PLAN NOTE
Patient reports that she eats very healthy diet, she does eat breakfast, lunch, dinner.  When she is working she eats breakfast bars.  She does workout almost every day.

## 2020-04-27 NOTE — ASSESSMENT & PLAN NOTE
Patient reports that she did follow-up with OB/GYN last year when she had her Pap smear.  She has ParaGard IUD placed 5 years ago.  She reports that she initially she was having heavy menstrual cycles and then since last year she did not have it..  She did have blood work TSH, LH, FSH, prolactin and testosterone checked which came back negative.  She reports that she does have follow-up appointment with OB/GYN for evaluation.

## 2020-04-27 NOTE — ASSESSMENT & PLAN NOTE
This is a chronic problem for this patient.  Her last vitamin D level was 33.  Patient is taking vitamin D tablets daily.

## 2020-06-05 ENCOUNTER — TELEPHONE (OUTPATIENT)
Dept: HEALTH INFORMATION MANAGEMENT | Facility: OTHER | Age: 35
End: 2020-06-05

## 2020-06-05 NOTE — TELEPHONE ENCOUNTER
1. Caller Name: Emili Awad                Call Back Number: [phone]  [facility] PCP or Specialty Provider: Yes         2.  In the last two weeks, has the patient had any new or worsening symptoms (not explained by alternative diagnosis)? Yes, the patient reports the following COVID-19 consistent symptoms: sore throat and fatigue, started today.     3.  Does patient have any comoribidities? None     4.  Has the patient traveled in the last 14 days OR had any known contact with someone who is suspected or confirmed to have COVID-19?  No.    5. Disposition: Advised to perform self care, monitor for worsening symptoms and to call back in 3 days if no improvement    Note routed to [facility] Provider: [facility] only.

## 2020-06-19 DIAGNOSIS — Z11.59 SCREENING FOR VIRAL DISEASE: ICD-10-CM

## 2020-07-10 ENCOUNTER — HOSPITAL ENCOUNTER (OUTPATIENT)
Dept: LAB | Facility: MEDICAL CENTER | Age: 35
End: 2020-07-10
Attending: FAMILY MEDICINE
Payer: COMMERCIAL

## 2020-07-10 ENCOUNTER — HOSPITAL ENCOUNTER (OUTPATIENT)
Facility: MEDICAL CENTER | Age: 35
End: 2020-07-10
Payer: COMMERCIAL

## 2020-07-10 DIAGNOSIS — Z11.59 SCREENING FOR VIRAL DISEASE: ICD-10-CM

## 2020-07-10 LAB
ALBUMIN SERPL BCP-MCNC: 4.5 G/DL (ref 3.2–4.9)
ALBUMIN/GLOB SERPL: 1.7 G/DL
ALP SERPL-CCNC: 71 U/L (ref 30–99)
ALT SERPL-CCNC: 22 U/L (ref 2–50)
ANION GAP SERPL CALC-SCNC: 11 MMOL/L (ref 7–16)
AST SERPL-CCNC: 21 U/L (ref 12–45)
BASOPHILS # BLD AUTO: 0.9 % (ref 0–1.8)
BASOPHILS # BLD: 0.04 K/UL (ref 0–0.12)
BDY FAT % MEASURED: 16.1 %
BILIRUB SERPL-MCNC: 0.5 MG/DL (ref 0.1–1.5)
BP DIAS: 65 MMHG
BP SYS: 96 MMHG
BUN SERPL-MCNC: 10 MG/DL (ref 8–22)
CALCIUM SERPL-MCNC: 9.7 MG/DL (ref 8.5–10.5)
CHLORIDE SERPL-SCNC: 103 MMOL/L (ref 96–112)
CHOLEST SERPL-MCNC: 177 MG/DL (ref 100–199)
CHOLEST SERPL-MCNC: 180 MG/DL (ref 100–199)
CO2 SERPL-SCNC: 26 MMOL/L (ref 20–33)
CREAT SERPL-MCNC: 0.75 MG/DL (ref 0.5–1.4)
DIABETES HTDIA: NO
EOSINOPHIL # BLD AUTO: 0.05 K/UL (ref 0–0.51)
EOSINOPHIL NFR BLD: 1.2 % (ref 0–6.9)
ERYTHROCYTE [DISTWIDTH] IN BLOOD BY AUTOMATED COUNT: 45.2 FL (ref 35.9–50)
EVENT NAME HTEVT: NORMAL
FASTING HTFAS: YES
FASTING STATUS PATIENT QL REPORTED: NORMAL
GLOBULIN SER CALC-MCNC: 2.6 G/DL (ref 1.9–3.5)
GLUCOSE SERPL-MCNC: 77 MG/DL (ref 65–99)
GLUCOSE SERPL-MCNC: 77 MG/DL (ref 65–99)
HCT VFR BLD AUTO: 41.2 % (ref 37–47)
HDLC SERPL-MCNC: 98 MG/DL
HDLC SERPL-MCNC: 98 MG/DL
HGB BLD-MCNC: 14.1 G/DL (ref 12–16)
HYPERTENSION HTHYP: NO
IMM GRANULOCYTES # BLD AUTO: 0.01 K/UL (ref 0–0.11)
IMM GRANULOCYTES NFR BLD AUTO: 0.2 % (ref 0–0.9)
LDLC SERPL CALC-MCNC: 66 MG/DL
LDLC SERPL CALC-MCNC: 69 MG/DL
LYMPHOCYTES # BLD AUTO: 1.59 K/UL (ref 1–4.8)
LYMPHOCYTES NFR BLD: 37.6 % (ref 22–41)
MCH RBC QN AUTO: 33.6 PG (ref 27–33)
MCHC RBC AUTO-ENTMCNC: 34.2 G/DL (ref 33.6–35)
MCV RBC AUTO: 98.1 FL (ref 81.4–97.8)
MONOCYTES # BLD AUTO: 0.36 K/UL (ref 0–0.85)
MONOCYTES NFR BLD AUTO: 8.5 % (ref 0–13.4)
NEUTROPHILS # BLD AUTO: 2.18 K/UL (ref 2–7.15)
NEUTROPHILS NFR BLD: 51.6 % (ref 44–72)
NRBC # BLD AUTO: 0 K/UL
NRBC BLD-RTO: 0 /100 WBC
PLATELET # BLD AUTO: 112 K/UL (ref 164–446)
PMV BLD AUTO: 11.7 FL (ref 9–12.9)
POTASSIUM SERPL-SCNC: 4 MMOL/L (ref 3.6–5.5)
PROT SERPL-MCNC: 7.1 G/DL (ref 6–8.2)
RBC # BLD AUTO: 4.2 M/UL (ref 4.2–5.4)
SCREENING LOC CITY HTCIT: NORMAL
SCREENING LOC STATE HTSTA: NORMAL
SCREENING LOCATION HTLOC: NORMAL
SMOKING HTSMO: NO
SODIUM SERPL-SCNC: 140 MMOL/L (ref 135–145)
SUBSCRIBER ID HTSID: NORMAL
TRIGL SERPL-MCNC: 67 MG/DL (ref 0–149)
TRIGL SERPL-MCNC: 67 MG/DL (ref 0–149)
TSH SERPL DL<=0.005 MIU/L-ACNC: 1.15 UIU/ML (ref 0.38–5.33)
WBC # BLD AUTO: 4.2 K/UL (ref 4.8–10.8)

## 2020-07-10 PROCEDURE — 84443 ASSAY THYROID STIM HORMONE: CPT

## 2020-07-10 PROCEDURE — 82947 ASSAY GLUCOSE BLOOD QUANT: CPT

## 2020-07-10 PROCEDURE — 36415 COLL VENOUS BLD VENIPUNCTURE: CPT

## 2020-07-10 PROCEDURE — 80053 COMPREHEN METABOLIC PANEL: CPT

## 2020-07-10 PROCEDURE — 80061 LIPID PANEL: CPT

## 2020-07-10 PROCEDURE — S5190 WELLNESS ASSESSMENT BY NONPH: HCPCS

## 2020-07-10 PROCEDURE — 86769 SARS-COV-2 COVID-19 ANTIBODY: CPT

## 2020-07-10 PROCEDURE — 85025 COMPLETE CBC W/AUTO DIFF WBC: CPT

## 2020-07-10 PROCEDURE — 80061 LIPID PANEL: CPT | Mod: 91

## 2020-07-11 LAB — SARS-COV-2 AB SERPL QL IA: NORMAL

## 2020-07-24 ENCOUNTER — OFFICE VISIT (OUTPATIENT)
Dept: MEDICAL GROUP | Facility: MEDICAL CENTER | Age: 35
End: 2020-07-24
Payer: COMMERCIAL

## 2020-07-24 VITALS
OXYGEN SATURATION: 99 % | HEIGHT: 66 IN | BODY MASS INDEX: 18.07 KG/M2 | SYSTOLIC BLOOD PRESSURE: 100 MMHG | HEART RATE: 47 BPM | RESPIRATION RATE: 12 BRPM | WEIGHT: 112.43 LBS | TEMPERATURE: 98.1 F | DIASTOLIC BLOOD PRESSURE: 52 MMHG

## 2020-07-24 DIAGNOSIS — E55.9 VITAMIN D DEFICIENCY: ICD-10-CM

## 2020-07-24 DIAGNOSIS — D69.6 THROMBOCYTOPENIA (HCC): ICD-10-CM

## 2020-07-24 DIAGNOSIS — D72.819 LEUKOPENIA, UNSPECIFIED TYPE: ICD-10-CM

## 2020-07-24 DIAGNOSIS — D75.89 MACROCYTOSIS: ICD-10-CM

## 2020-07-24 PROCEDURE — 99214 OFFICE O/P EST MOD 30 MIN: CPT | Performed by: FAMILY MEDICINE

## 2020-07-24 ASSESSMENT — ENCOUNTER SYMPTOMS
HEADACHES: 0
BLOOD IN STOOL: 0
NERVOUS/ANXIOUS: 0
COUGH: 0
FEVER: 0
PALPITATIONS: 0
CONSTIPATION: 0
DEPRESSION: 0
CHILLS: 0
ABDOMINAL PAIN: 0
FOCAL WEAKNESS: 0
WHEEZING: 0
SENSORY CHANGE: 0
VOMITING: 0
SHORTNESS OF BREATH: 0
HEMOPTYSIS: 0
DIARRHEA: 0
DIZZINESS: 0
NAUSEA: 0
MYALGIAS: 0

## 2020-07-24 ASSESSMENT — FIBROSIS 4 INDEX: FIB4 SCORE: 1.32

## 2020-07-24 NOTE — ASSESSMENT & PLAN NOTE
This is a new problem for this patient.  Recent blood work showed platelet count 112, numbers reduced from 200 from previous labs.  She denies any bleeding, bruising.

## 2020-07-24 NOTE — LETTER
July 24, 2020         Patient: Emili Awad   YOB: 1985   Date of Visit: 7/24/2020           To Whom it May Concern:    Emili Awad was seen in my clinic on 7/24/2020. She may return to work on 7/25/2020.    If you have any questions or concerns, please don't hesitate to call.        Sincerely,           Mac Ramirez M.D.  Electronically Signed

## 2020-07-24 NOTE — ASSESSMENT & PLAN NOTE
This is a chronic problem for this patient.  She is taking vitamin D.  Her last vitamin D level was checked last year and was 33.  We will recheck her vitamin D levels.

## 2020-07-24 NOTE — ASSESSMENT & PLAN NOTE
This is a new problem for this patient.  Recent labs showed MCV 98.1, MCH 33.6, hemoglobin 14.1.  She reports that she does eat meat.  She is also taking multivitamins.  We will check her vitamin B12 and folate level.

## 2020-07-24 NOTE — PROGRESS NOTES
FAMILY MEDICINE VISIT                                                               Chief complaint::Diagnoses of Macrocytosis, Thrombocytopenia (HCC), Leukopenia, unspecified type, and Vitamin D deficiency were pertinent to this visit.    History of present illness: Emili Awad is a 35 y.o. female who presented for lab follow-up.    Vitamin D deficiency  This is a chronic problem for this patient.  She is taking vitamin D.  Her last vitamin D level was checked last year and was 33.  We will recheck her vitamin D levels.    Macrocytosis  This is a new problem for this patient.  Recent labs showed MCV 98.1, MCH 33.6, hemoglobin 14.1.  She reports that she does eat meat.  She is also taking multivitamins.  We will check her vitamin B12 and folate level.    Thrombocytopenia (HCC)  This is a new problem for this patient.  Recent blood work showed platelet count 112, numbers reduced from 200 from previous labs.  She denies any bleeding, bruising.    Leukopenia  This is a new problem for this patient.  Her white count has been improving, last WBC count was 3.5 and now it improved to 4.2.  She denies any fever, nasal congestion, sore throat, diarrhea, constipation, abdominal pain.      Review of systems:     Review of Systems   Constitutional: Negative for chills, fever and malaise/fatigue.   Respiratory: Negative for cough, hemoptysis, shortness of breath and wheezing.    Cardiovascular: Negative for chest pain, palpitations and leg swelling.   Gastrointestinal: Negative for abdominal pain, blood in stool, constipation, diarrhea, nausea and vomiting.   Musculoskeletal: Negative for myalgias.   Neurological: Negative for dizziness, sensory change, focal weakness and headaches.   Psychiatric/Behavioral: Negative for depression and suicidal ideas. The patient is not nervous/anxious.         Past Medical, Surgical and Family History:    Past Medical History:   Diagnosis Date   • Cardiac arrhythmia    • Chickenpox    •  "Eczema    • Episodic lightheadedness 2017   • H. pylori infection     treated   • Hypokalemia    • IUD (intrauterine device) in place    • Needle stick injury     took medications, pt was tested negative     Past Surgical History:   Procedure Laterality Date   • DENTAL SURGERY      wisdom teeth extraction   • DILATION AND CURETTAGE      D & C, election  in      Family History   Problem Relation Age of Onset   • Diabetes Mother    • Hypertension Father    • Cancer Paternal Uncle         lymphoma        Social History:    Social History     Tobacco Use   • Smoking status: Never Smoker   • Smokeless tobacco: Never Used   Substance Use Topics   • Alcohol use: Yes     Alcohol/week: 0.0 oz     Comment: occasional   • Drug use: No        Medications and Allergies:     Current Outpatient Medications   Medication Sig Dispense Refill   • Ascorbic Acid (VITAMIN C PO) Take  by mouth.     • Cholecalciferol (VITAMIN D PO) Take 1 Tab by mouth every day.     • Multiple Vitamins-Minerals (MULTIVITAMIN PO) Take  by mouth.     • Probiotic Product (PROBIOTIC DAILY PO) Take  by mouth.       No current facility-administered medications for this visit.         No Known Allergies    Vitals:    /52 (BP Location: Left arm, Patient Position: Sitting, BP Cuff Size: Adult)   Pulse (!) 47   Temp 36.7 °C (98.1 °F)   Resp 12   Ht 1.676 m (5' 6\")   Wt 51 kg (112 lb 7 oz)   SpO2 99%  Body mass index is 18.15 kg/m².    Physical Exam:     Physical Exam   Constitutional: She is well-developed, well-nourished, and in no distress. No distress.   HENT:   Head: Normocephalic and atraumatic.   Eyes: Conjunctivae are normal.   Neck: Neck supple.   Cardiovascular: Normal rate, regular rhythm and normal heart sounds. Exam reveals no gallop and no friction rub.   No murmur heard.  Pulmonary/Chest: Effort normal and breath sounds normal. No respiratory distress. She has no wheezes. She has no rales.   Musculoskeletal:         " General: No deformity or edema.   Neurological: She is alert. Gait normal.   Psychiatric: Mood, affect and judgment normal.        Labs:  Ref Range & Units  2wk ago    TSH  0.380 - 5.330 uIU/mL  1.150      Ref Range & Units  2wk ago    Cholesterol,Tot  100 - 199 mg/dL  177     Triglycerides  0 - 149 mg/dL  67     HDL  >=40 mg/dL  98     LDL  <100 mg/dL  66      Ref Range & Units  2wk ago    Sodium  135 - 145 mmol/L  140     Potassium  3.6 - 5.5 mmol/L  4.0     Chloride  96 - 112 mmol/L  103     Co2  20 - 33 mmol/L  26     Anion Gap  7.0 - 16.0  11.0     Glucose  65 - 99 mg/dL  77     Bun  8 - 22 mg/dL  10     Creatinine  0.50 - 1.40 mg/dL  0.75     Calcium  8.5 - 10.5 mg/dL  9.7     AST(SGOT)  12 - 45 U/L  21     ALT(SGPT)  2 - 50 U/L  22     Alkaline Phosphatase  30 - 99 U/L  71     Total Bilirubin  0.1 - 1.5 mg/dL  0.5     Albumin  3.2 - 4.9 g/dL  4.5     Total Protein  6.0 - 8.2 g/dL  7.1     Globulin  1.9 - 3.5 g/dL  2.6     A-G Ratio  g/dL  1.7      Ref Range & Units  2wk ago    WBC  4.8 - 10.8 K/uL  4.2Low       RBC  4.20 - 5.40 M/uL  4.20     Hemoglobin  12.0 - 16.0 g/dL  14.1     Hematocrit  37.0 - 47.0 %  41.2     MCV  81.4 - 97.8 fL  98.1High       MCH  27.0 - 33.0 pg  33.6High       MCHC  33.6 - 35.0 g/dL  34.2     RDW  35.9 - 50.0 fL  45.2     Platelet Count  164 - 446 K/uL  112Low       MPV  9.0 - 12.9 fL  11.7     Neutrophils-Polys  44.00 - 72.00 %  51.60     Lymphocytes  22.00 - 41.00 %  37.60     Monocytes  0.00 - 13.40 %  8.50     Eosinophils  0.00 - 6.90 %  1.20     Basophils  0.00 - 1.80 %  0.90     Immature Granulocytes  0.00 - 0.90 %  0.20     Nucleated RBC  /100 WBC  0.00     Neutrophils (Absolute)  2.00 - 7.15 K/uL  2.18     Comment: Includes immature neutrophils, if present.    Lymphs (Absolute)  1.00 - 4.80 K/uL  1.59     Monos (Absolute)  0.00 - 0.85 K/uL  0.36     Eos (Absolute)  0.00 - 0.51 K/uL  0.05     Baso (Absolute)  0.00 - 0.12 K/uL  0.04     Immature Granulocytes (abs)  0.00 - 0.11  K/uL  0.01     NRBC (Absolute)  K/uL  0.00      Ref Range & Units  2wk ago    GFR If African American  >60 mL/min/1.73 m 2  >60     GFR If Non African American  >60 mL/min/1.73 m 2  >60            Assessment/Plan:    Diagnoses and all orders for this visit:    Macrocytosis:  · We will check her vitamin B12 and folate.  · Advised patient to continue multivitamins.    -     CBC WITH DIFFERENTIAL; Future  -     VITAMIN B12; Future  -     FOLATE; Future    Thrombocytopenia (HCC):  · Unknown etiology, patient is asymptomatic,  · We will check CBC again in 3 to 4 months.  · Advised patient to notify if she has any bleeding or bruising.    -     CBC WITH DIFFERENTIAL; Future    Leukopenia, unspecified type:  · White cell count is improving.  · Advised patient to take vitamin C  · We will check CBC in 3 to 4 months.    -     CBC WITH DIFFERENTIAL; Future    Vitamin D deficiency:  · Recheck vitamin D level.  · Advised to continue vitamin D supplementation.    -     VITAMIN D,25 HYDROXY; Future         Please note that this dictation was created using voice recognition software. I have made every reasonable attempt to correct obvious errors, but I expect that there are errors of grammar and possibly content that I did not discover before finalizing the note.    Follow up in 4 months for lab follow up.

## 2020-07-24 NOTE — ASSESSMENT & PLAN NOTE
This is a new problem for this patient.  Her white count has been improving, last WBC count was 3.5 and now it improved to 4.2.  She denies any fever, nasal congestion, sore throat, diarrhea, constipation, abdominal pain.

## 2020-07-27 ENCOUNTER — GYNECOLOGY VISIT (OUTPATIENT)
Dept: OBGYN | Facility: CLINIC | Age: 35
End: 2020-07-27
Payer: COMMERCIAL

## 2020-07-27 VITALS — WEIGHT: 116 LBS | BODY MASS INDEX: 18.72 KG/M2

## 2020-07-27 DIAGNOSIS — Z00.00 ANNUAL PHYSICAL EXAM: ICD-10-CM

## 2020-07-27 DIAGNOSIS — N91.1 SECONDARY AMENORRHEA: ICD-10-CM

## 2020-07-27 PROCEDURE — 99395 PREV VISIT EST AGE 18-39: CPT | Performed by: OBSTETRICS & GYNECOLOGY

## 2020-07-27 RX ORDER — MEDROXYPROGESTERONE ACETATE 10 MG/1
10 TABLET ORAL DAILY
Qty: 10 TAB | Refills: 0 | Status: SHIPPED | OUTPATIENT
Start: 2020-07-27 | End: 2020-08-06

## 2020-07-27 ASSESSMENT — FIBROSIS 4 INDEX: FIB4 SCORE: 1.32

## 2020-07-27 NOTE — PROGRESS NOTES
Emili Awad is a 35 y.o.  female who presents for her Annual Gynecologic Exam      HPI Comments: Pt presents for her annual well woman exam.  Has had amenorrhea for over 3-4 years since IUD insertion.  Has had w/u with hormones and progesterone challenge which was successful in past.     Review of Systems:   Pertinent positives documented in HPI and all other systems reviewed & are negative    OB History    Para Term  AB Living   1 0 0 0 1 0   SAB TAB Ectopic Molar Multiple Live Births   0 1 0 0 0 0       Past Gynecological History  No LMP recorded. Patient has had an implant.  BC: Copper IUD ~  Menses: amenorrhea for 2 years, had light bleeding 2 years ago and before that went over a year without bleeding  Sexually active: yes with   Sexually transmitted infections: denies  Pap: last , denies hx of abnormal  Colonoscopy: 2018 normal, hemorrhoids  History of sexual abuse: denies  Fibroids?: denies  Ovarian Cysts?: denies      All PMH, PSH, allergies, social history and FH reviewed and updated today:  Past Medical History:   Diagnosis Date   • Cardiac arrhythmia    • Chickenpox    • Eczema    • Episodic lightheadedness 2017   • H. pylori infection     treated   • Hypokalemia    • IUD (intrauterine device) in place    • Needle stick injury     took medications, pt was tested negative     Past Surgical History:   Procedure Laterality Date   • DENTAL SURGERY      wisdom teeth extraction   • DILATION AND CURETTAGE      D & C, election  in      Medications:   Current Outpatient Medications Ordered in Epic   Medication Sig Dispense Refill   • medroxyPROGESTERone (PROVERA) 10 MG Tab Take 1 Tab by mouth every day for 10 days. 10 Tab 0   • Ascorbic Acid (VITAMIN C PO) Take  by mouth.     • Cholecalciferol (VITAMIN D PO) Take 1 Tab by mouth every day.     • Multiple Vitamins-Minerals (MULTIVITAMIN PO) Take  by mouth.     • Probiotic Product (PROBIOTIC DAILY PO) Take   by mouth.       No current Epic-ordered facility-administered medications on file.      Patient has no known allergies.  Social History     Socioeconomic History   • Marital status:      Spouse name: Not on file   • Number of children: Not on file   • Years of education: Not on file   • Highest education level: Not on file   Occupational History   • Occupation: [employer] tech   Social Needs   • Financial resource strain: Not on file   • Food insecurity     Worry: Not on file     Inability: Not on file   • Transportation needs     Medical: Not on file     Non-medical: Not on file   Tobacco Use   • Smoking status: Never Smoker   • Smokeless tobacco: Never Used   Substance and Sexual Activity   • Alcohol use: Yes     Alcohol/week: 0.0 oz     Comment: occasional   • Drug use: No   • Sexual activity: Yes     Partners: Male     Birth control/protection: I.U.D.     Comment: Yajaira in 2015, works at [employer]   • Physical activity     Days per week: Not on file     Minutes per session: Not on file   • Stress: Not on file   Relationships   • Social connections     Talks on phone: Not on file     Gets together: Not on file     Attends Jew service: Not on file     Active member of club or organization: Not on file     Attends meetings of clubs or organizations: Not on file     Relationship status: Not on file   • Intimate partner violence     Fear of current or ex partner: Not on file     Emotionally abused: Not on file     Physically abused: Not on file     Forced sexual activity: Not on file   Other Topics Concern   • Not on file   Social History Narrative    , no children.      Family History   Problem Relation Age of Onset   • Diabetes Mother    • Hypertension Father    • Cancer Paternal Uncle         lymphoma          Objective:   Vital measurements:  Wt 52.6 kg (116 lb)   Body mass index is 18.72 kg/m². (Goal BM I>18 <25)    Physical Exam   Nursing note and vitals  reviewed.  Constitutional: She is oriented to person, place, and time. She appears well-developed and well-nourished. No distress.     HEENT:   Head: Normocephalic and atraumatic.   Right Ear: External ear normal.   Left Ear: External ear normal.   Nose: Nose normal.   Eyes: Conjunctivae and EOM are normal. Pupils are equal, round, and reactive to light. No scleral icterus.     Neck: Normal range of motion. Neck supple. No tracheal deviation present. No thyromegaly present. No cervical or supraclavicular lymphadenopathy.    Pulmonary/Chest: Effort normal and breath sounds normal. No respiratory distress. She has no wheezes. She has no rales. She exhibits no tenderness.     Cardiovascular: Regular, rate and rhythm. No edema.    Breast:  Symmetrical, normal consistency without masses., No dimpling or skin changes, Normal nipples without discharge, no axillary lymphadenopathy    Abdominal: Soft. Bowel sounds are normal. She exhibits no distension and no mass. No tenderness. She has no rebound and no guarding.     Genitourinary:  Pelvic exam was performed with patient supine.  External genitalia with no abnormal pigmentation, labial fusion, rash, tenderness, lesion or injury to the labia bilaterally.  BUS normal  Vagina is pink and moist with no lesions, foul discharge, erythema, tenderness or bleeding. No foreign body around the vagina or signs of injury.   Cervix exhibits no motion tenderness, no discharge and no friability, no lesions.   Uterus is not deviated, not enlarged, not fixed and not tender.  Right adnexa displays no mass, no tenderness and no fullness.  Left adnexa displays no mass, no tenderness and no fullness.     Musculoskeletal: Normal range of motion. Non tender. She exhibits no edema and no tenderness.     Lymphadenopathy: She has no cervical or supraclavicular adenopathy.     Neurological: She is alert and oriented to person, place, and time. She exhibits normal muscle tone.     Skin: Skin is warm  and dry. No rash noted. She is not diaphoretic. No erythema. No pallor.     Psychiatric: She has a normal mood and affect. Her behavior is normal. Judgment and thought content normal.        Assessment:     1. Annual physical exam     2. Secondary amenorrhea  US-PELVIC COMPLETE (TRANSABDOMINAL/TRANSVAGINAL) ([facility])    ANTI-MULLERIAN HORMONE([facility])    ESTRADIOL    FSH    PROLACTIN    PROGESTERONE         Plan:   Today we specifically addressed her amenorrhea and the plan is to investigate premature ovarian failure.  If labs are normal then will do progesterone challenge.  If bleeds, can still do pelvic US to evaluate pelvic organs as she is planning on children in next few years.      Pap and physical exam performed  Counseling: breast self exam, use and side effects of OCPs, osteoporosis and adequate intake of calcium and vitamin D  Encouraged exercise and proper diet.  Weight bearing exercise daily to strengthen bones  Calcium 1200mg daily and vit D 800 IU daily, prenatal vitamins during childbearing years

## 2020-07-28 ENCOUNTER — HOSPITAL ENCOUNTER (OUTPATIENT)
Dept: LAB | Facility: MEDICAL CENTER | Age: 35
End: 2020-07-28
Attending: OBSTETRICS & GYNECOLOGY
Payer: COMMERCIAL

## 2020-07-28 DIAGNOSIS — N91.1 SECONDARY AMENORRHEA: ICD-10-CM

## 2020-07-28 LAB
ESTRADIOL SERPL-MCNC: 21.3 PG/ML
FSH SERPL-ACNC: 4.5 MIU/ML
PROGEST SERPL-MCNC: 0.1 NG/ML
PROLACTIN SERPL-MCNC: 15 NG/ML (ref 2.8–26)

## 2020-07-28 PROCEDURE — 83001 ASSAY OF GONADOTROPIN (FSH): CPT

## 2020-07-28 PROCEDURE — 84146 ASSAY OF PROLACTIN: CPT

## 2020-07-28 PROCEDURE — 82670 ASSAY OF TOTAL ESTRADIOL: CPT

## 2020-07-28 PROCEDURE — 83520 IMMUNOASSAY QUANT NOS NONAB: CPT

## 2020-07-28 PROCEDURE — 36415 COLL VENOUS BLD VENIPUNCTURE: CPT

## 2020-07-28 PROCEDURE — 84144 ASSAY OF PROGESTERONE: CPT

## 2020-07-31 LAB — MIS SERPL-MCNC: 4.52 NG/ML (ref 0.18–11.71)

## 2020-08-05 ENCOUNTER — TELEPHONE (OUTPATIENT)
Dept: OBGYN | Facility: CLINIC | Age: 35
End: 2020-08-05

## 2020-08-05 RX ORDER — MEDROXYPROGESTERONE ACETATE 10 MG/1
10 TABLET ORAL DAILY
Qty: 30 TAB | Refills: 0 | Status: SHIPPED | OUTPATIENT
Start: 2020-08-05 | End: 2021-01-20

## 2020-08-05 NOTE — TELEPHONE ENCOUNTER
Called pt. No answer. Left VM for patient to call back.    1548 pt called back. Spoke with pt regarding her questions. Pt stated she is not bleeding wanting to know if she needs to get an US. informed pt that Dr Rebollar is out of the office until next week and I needed to consult with Dr. Gray who is in the office today. told that I was going to call her back with and answer.    Consulted with Dr Dasilva  regarding pt's questions and Dr. Rebollar's plan for pt (Progesterone challenge) Rx for Provera sent to pt's pharmacy. Take it for 10 days then stop. Have pt call us if she bleeds, then US will be ordered. Pt will need to follow up with Dr. Rebollar.     1558 called pt. No answer. Left VM to call us back.  When pt calls back please inform her the above instructions.    ----- Message from Emili Awad sent at 8/4/2020  4:22 PM PDT -----  Regarding: Test Result Question  Contact: [phone]  Hi there! Good news for my ovaries.  Now the next step is the progesterone test right? Do I  have a progesterone prescription sent to my pharmacy? Also do I need to have my ultrasound yet? Lemme know! Thank you

## 2020-09-29 ENCOUNTER — IMMUNIZATION (OUTPATIENT)
Dept: OCCUPATIONAL MEDICINE | Facility: CLINIC | Age: 35
End: 2020-09-29

## 2020-09-29 DIAGNOSIS — Z23 NEED FOR VACCINATION: ICD-10-CM

## 2020-09-29 PROCEDURE — 90686 IIV4 VACC NO PRSV 0.5 ML IM: CPT | Performed by: NURSE PRACTITIONER

## 2020-11-18 ENCOUNTER — HOSPITAL ENCOUNTER (OUTPATIENT)
Dept: LAB | Facility: MEDICAL CENTER | Age: 35
End: 2020-11-18
Attending: FAMILY MEDICINE
Payer: COMMERCIAL

## 2020-11-18 DIAGNOSIS — D72.819 LEUKOPENIA, UNSPECIFIED TYPE: ICD-10-CM

## 2020-11-18 DIAGNOSIS — E55.9 VITAMIN D DEFICIENCY: ICD-10-CM

## 2020-11-18 DIAGNOSIS — D75.89 MACROCYTOSIS: ICD-10-CM

## 2020-11-18 DIAGNOSIS — D69.6 THROMBOCYTOPENIA (HCC): ICD-10-CM

## 2020-11-18 LAB
BASOPHILS # BLD AUTO: 0.6 % (ref 0–1.8)
BASOPHILS # BLD: 0.03 K/UL (ref 0–0.12)
EOSINOPHIL # BLD AUTO: 0.03 K/UL (ref 0–0.51)
EOSINOPHIL NFR BLD: 0.6 % (ref 0–6.9)
ERYTHROCYTE [DISTWIDTH] IN BLOOD BY AUTOMATED COUNT: 44 FL (ref 35.9–50)
HCT VFR BLD AUTO: 37.6 % (ref 37–47)
HGB BLD-MCNC: 13.2 G/DL (ref 12–16)
IMM GRANULOCYTES # BLD AUTO: 0.01 K/UL (ref 0–0.11)
IMM GRANULOCYTES NFR BLD AUTO: 0.2 % (ref 0–0.9)
LYMPHOCYTES # BLD AUTO: 1.71 K/UL (ref 1–4.8)
LYMPHOCYTES NFR BLD: 35.6 % (ref 22–41)
MCH RBC QN AUTO: 33.8 PG (ref 27–33)
MCHC RBC AUTO-ENTMCNC: 35.1 G/DL (ref 33.6–35)
MCV RBC AUTO: 96.4 FL (ref 81.4–97.8)
MONOCYTES # BLD AUTO: 0.48 K/UL (ref 0–0.85)
MONOCYTES NFR BLD AUTO: 10 % (ref 0–13.4)
NEUTROPHILS # BLD AUTO: 2.55 K/UL (ref 2–7.15)
NEUTROPHILS NFR BLD: 53 % (ref 44–72)
NRBC # BLD AUTO: 0 K/UL
NRBC BLD-RTO: 0 /100 WBC
PLATELET # BLD AUTO: 205 K/UL (ref 164–446)
PMV BLD AUTO: 10.5 FL (ref 9–12.9)
RBC # BLD AUTO: 3.9 M/UL (ref 4.2–5.4)
WBC # BLD AUTO: 4.8 K/UL (ref 4.8–10.8)

## 2020-11-18 PROCEDURE — 82746 ASSAY OF FOLIC ACID SERUM: CPT

## 2020-11-18 PROCEDURE — 82607 VITAMIN B-12: CPT

## 2020-11-18 PROCEDURE — 36415 COLL VENOUS BLD VENIPUNCTURE: CPT

## 2020-11-18 PROCEDURE — 85025 COMPLETE CBC W/AUTO DIFF WBC: CPT

## 2020-11-18 PROCEDURE — 82306 VITAMIN D 25 HYDROXY: CPT

## 2020-11-19 LAB
25(OH)D3 SERPL-MCNC: 50 NG/ML (ref 30–100)
FOLATE SERPL-MCNC: 36.9 NG/ML
VIT B12 SERPL-MCNC: 1035 PG/ML (ref 211–911)

## 2020-12-01 ENCOUNTER — OFFICE VISIT (OUTPATIENT)
Dept: MEDICAL GROUP | Facility: MEDICAL CENTER | Age: 35
End: 2020-12-01
Payer: COMMERCIAL

## 2020-12-01 VITALS
RESPIRATION RATE: 16 BRPM | OXYGEN SATURATION: 94 % | SYSTOLIC BLOOD PRESSURE: 97 MMHG | HEART RATE: 82 BPM | HEIGHT: 66 IN | WEIGHT: 116.84 LBS | DIASTOLIC BLOOD PRESSURE: 51 MMHG | TEMPERATURE: 97.8 F | BODY MASS INDEX: 18.78 KG/M2

## 2020-12-01 DIAGNOSIS — E55.9 VITAMIN D DEFICIENCY: ICD-10-CM

## 2020-12-01 DIAGNOSIS — D72.819 LEUKOPENIA, UNSPECIFIED TYPE: ICD-10-CM

## 2020-12-01 DIAGNOSIS — D75.89 MACROCYTOSIS: ICD-10-CM

## 2020-12-01 PROBLEM — D69.6 THROMBOCYTOPENIA (HCC): Status: RESOLVED | Noted: 2020-07-24 | Resolved: 2020-12-01

## 2020-12-01 PROCEDURE — 99214 OFFICE O/P EST MOD 30 MIN: CPT | Performed by: FAMILY MEDICINE

## 2020-12-01 ASSESSMENT — ENCOUNTER SYMPTOMS
NAUSEA: 0
SHORTNESS OF BREATH: 0
VOMITING: 0
DIARRHEA: 0
WHEEZING: 0
NERVOUS/ANXIOUS: 0
SENSORY CHANGE: 0
DIZZINESS: 0
HEMOPTYSIS: 0
PALPITATIONS: 0
COUGH: 0
FEVER: 0
CHILLS: 0
ABDOMINAL PAIN: 0
HEADACHES: 0
CONSTIPATION: 0
DEPRESSION: 0
BLOOD IN STOOL: 0
FOCAL WEAKNESS: 0
MYALGIAS: 0

## 2020-12-01 ASSESSMENT — FIBROSIS 4 INDEX: FIB4 SCORE: 0.72

## 2020-12-01 NOTE — PROGRESS NOTES
FAMILY MEDICINE VISIT                                                               Chief complaint::Diagnoses of Macrocytosis, Leukopenia, unspecified type, and Vitamin D deficiency were pertinent to this visit.    History of present illness: Emili Awad is a 35 y.o. female who presented for lab follow-up.      Leukopenia  Chronic problem for this patient.  Recent labs showed improvement in white count and all other counts came back normal.  Denies any symptoms.    Macrocytosis  Chronic problem for this patient.  Recent labs showed normal folate level and mildly elevated vitamin B12.  She is taking multivitamins.  She has improvement in MCV count.  Denies any concerns.    Vitamin D deficiency  Chronic problem for this patient.  Taking vitamin D supplementation.  Recent vitamin D level came back normal.      Review of systems:     Review of Systems   Constitutional: Negative for chills, fever and malaise/fatigue.   Respiratory: Negative for cough, hemoptysis, shortness of breath and wheezing.    Cardiovascular: Negative for chest pain, palpitations and leg swelling.   Gastrointestinal: Negative for abdominal pain, blood in stool, constipation, diarrhea, nausea and vomiting.   Musculoskeletal: Negative for myalgias.   Neurological: Negative for dizziness, sensory change, focal weakness and headaches.   Psychiatric/Behavioral: Negative for depression. The patient is not nervous/anxious.         Past Medical, Surgical and Family History:    Past Medical History:   Diagnosis Date   • Cardiac arrhythmia    • Chickenpox    • Eczema    • Episodic lightheadedness 2017   • H. pylori infection     treated   • Hypokalemia    • IUD (intrauterine device) in place    • Needle stick injury     took medications, pt was tested negative     Past Surgical History:   Procedure Laterality Date   • DENTAL SURGERY      wisdom teeth extraction   • DILATION AND CURETTAGE      D & C, election  in      Family History  "  Problem Relation Age of Onset   • Diabetes Mother    • Hypertension Father    • Cancer Paternal Uncle         lymphoma        Social History:    Social History     Tobacco Use   • Smoking status: Never Smoker   • Smokeless tobacco: Never Used   Substance Use Topics   • Alcohol use: Yes     Alcohol/week: 0.0 oz     Comment: occasional   • Drug use: No        Medications and Allergies:     Current Outpatient Medications   Medication Sig Dispense Refill   • medroxyPROGESTERone (PROVERA) 10 MG Tab Take 1 Tab by mouth every day. Take for 10 days then stop.  Please let us know if you have bleeding or not 30 Tab 0   • Ascorbic Acid (VITAMIN C PO) Take  by mouth.     • Cholecalciferol (VITAMIN D PO) Take 1 Tab by mouth every day.     • Multiple Vitamins-Minerals (MULTIVITAMIN PO) Take  by mouth.     • Probiotic Product (PROBIOTIC DAILY PO) Take  by mouth.       No current facility-administered medications for this visit.         No Known Allergies    Vitals:    BP (!) 97/51 (BP Location: Left arm, Patient Position: Sitting, BP Cuff Size: Small infant)   Pulse 82   Temp 36.6 °C (97.8 °F)   Resp 16   Ht 1.676 m (5' 6\")   Wt 53 kg (116 lb 13.5 oz)   SpO2 94%  Body mass index is 18.86 kg/m².    Physical Exam:     Physical Exam   Constitutional: She is well-developed, well-nourished, and in no distress. No distress.   HENT:   Head: Normocephalic and atraumatic.   Eyes: Conjunctivae are normal.   Neck: Neck supple.   Cardiovascular: Normal rate.   Pulmonary/Chest: Effort normal. No respiratory distress. She has no wheezes.   Musculoskeletal:         General: No deformity or edema.   Neurological: She is alert. Gait normal.   Skin: Skin is warm. No rash noted.   Psychiatric: Mood, affect and judgment normal.        Labs: Reviewed recent labs with patient.      Assessment/Plan:    Diagnoses and all orders for this visit:    Macrocytosis without anemia:  · Improving, continue multivitamins.    Leukopenia, unspecified " type  · Resolved, came back normal this time.    Vitamin D deficiency  · Stable, continue vitamin D supplementation.         Please note that this dictation was created using voice recognition software. I have made every reasonable attempt to correct obvious errors, but I expect that there are errors of grammar and possibly content that I did not discover before finalizing the note.    Follow up yearly for annual physicals.

## 2020-12-01 NOTE — ASSESSMENT & PLAN NOTE
Chronic problem for this patient.  Taking vitamin D supplementation.  Recent vitamin D level came back normal.

## 2020-12-01 NOTE — ASSESSMENT & PLAN NOTE
Chronic problem for this patient.  Recent labs showed normal folate level and mildly elevated vitamin B12.  She is taking multivitamins.  She has improvement in MCV count.  Denies any concerns.

## 2020-12-01 NOTE — ASSESSMENT & PLAN NOTE
Chronic problem for this patient.  Recent labs showed improvement in white count and all other counts came back normal.  Denies any symptoms.

## 2020-12-07 ENCOUNTER — HOSPITAL ENCOUNTER (OUTPATIENT)
Facility: MEDICAL CENTER | Age: 35
End: 2020-12-07
Attending: PREVENTIVE MEDICINE
Payer: COMMERCIAL

## 2020-12-07 ENCOUNTER — EH NON-PROVIDER (OUTPATIENT)
Dept: OCCUPATIONAL MEDICINE | Facility: CLINIC | Age: 35
End: 2020-12-07

## 2020-12-07 DIAGNOSIS — Z11.59 ENCOUNTER FOR SCREENING FOR OTHER VIRAL DISEASES: ICD-10-CM

## 2020-12-07 PROCEDURE — U0003 INFECTIOUS AGENT DETECTION BY NUCLEIC ACID (DNA OR RNA); SEVERE ACUTE RESPIRATORY SYNDROME CORONAVIRUS 2 (SARS-COV-2) (CORONAVIRUS DISEASE [COVID-19]), AMPLIFIED PROBE TECHNIQUE, MAKING USE OF HIGH THROUGHPUT TECHNOLOGIES AS DESCRIBED BY CMS-2020-01-R: HCPCS | Mod: CS | Performed by: PREVENTIVE MEDICINE

## 2020-12-08 DIAGNOSIS — Z11.59 ENCOUNTER FOR SCREENING FOR OTHER VIRAL DISEASES: ICD-10-CM

## 2020-12-08 LAB
COVID ORDER STATUS COVID19: NORMAL
SARS-COV-2 RNA RESP QL NAA+PROBE: NOTDETECTED
SPECIMEN SOURCE: NORMAL

## 2020-12-12 ENCOUNTER — HOSPITAL ENCOUNTER (OUTPATIENT)
Dept: LAB | Facility: MEDICAL CENTER | Age: 35
End: 2020-12-12
Attending: EMERGENCY MEDICINE
Payer: COMMERCIAL

## 2020-12-12 LAB
COVID ORDER STATUS COVID19: NORMAL
SARS-COV-2 RNA RESP QL NAA+PROBE: DETECTED
SPECIMEN SOURCE: ABNORMAL

## 2020-12-20 DIAGNOSIS — Z23 NEED FOR VACCINATION: ICD-10-CM

## 2020-12-30 ENCOUNTER — IMMUNIZATION (OUTPATIENT)
Dept: FAMILY PLANNING/WOMEN'S HEALTH CLINIC | Facility: IMMUNIZATION CENTER | Age: 35
End: 2020-12-30
Attending: FAMILY MEDICINE
Payer: COMMERCIAL

## 2020-12-30 DIAGNOSIS — Z23 ENCOUNTER FOR VACCINATION: Primary | ICD-10-CM

## 2020-12-30 DIAGNOSIS — Z23 NEED FOR VACCINATION: ICD-10-CM

## 2020-12-30 PROCEDURE — 0011A MODERNA SARS-COV-2 VACCINE: CPT

## 2020-12-30 PROCEDURE — 91301 MODERNA SARS-COV-2 VACCINE: CPT

## 2021-01-20 ENCOUNTER — OFFICE VISIT (OUTPATIENT)
Dept: MEDICAL GROUP | Facility: MEDICAL CENTER | Age: 36
End: 2021-01-20
Payer: COMMERCIAL

## 2021-01-20 VITALS
BODY MASS INDEX: 19.17 KG/M2 | DIASTOLIC BLOOD PRESSURE: 52 MMHG | SYSTOLIC BLOOD PRESSURE: 98 MMHG | TEMPERATURE: 97.6 F | RESPIRATION RATE: 20 BRPM | OXYGEN SATURATION: 100 % | HEIGHT: 66 IN | HEART RATE: 50 BPM | WEIGHT: 119.27 LBS

## 2021-01-20 DIAGNOSIS — R06.02 SHORTNESS OF BREATH: ICD-10-CM

## 2021-01-20 DIAGNOSIS — R07.9 CHEST PAIN, UNSPECIFIED TYPE: ICD-10-CM

## 2021-01-20 PROCEDURE — 99214 OFFICE O/P EST MOD 30 MIN: CPT | Performed by: FAMILY MEDICINE

## 2021-01-20 ASSESSMENT — ENCOUNTER SYMPTOMS
DIZZINESS: 0
FEVER: 0
HEMOPTYSIS: 0
CONSTIPATION: 0
WHEEZING: 0
CHILLS: 0
HEADACHES: 0
BLOOD IN STOOL: 0
MYALGIAS: 0
SHORTNESS OF BREATH: 1
FOCAL WEAKNESS: 0
NAUSEA: 0
SPUTUM PRODUCTION: 0
COUGH: 0
SENSORY CHANGE: 0
DIARRHEA: 0
VOMITING: 0
ABDOMINAL PAIN: 0
PALPITATIONS: 0

## 2021-01-20 ASSESSMENT — FIBROSIS 4 INDEX: FIB4 SCORE: 0.72

## 2021-01-20 ASSESSMENT — PATIENT HEALTH QUESTIONNAIRE - PHQ9: CLINICAL INTERPRETATION OF PHQ2 SCORE: 0

## 2021-01-21 ENCOUNTER — HOSPITAL ENCOUNTER (OUTPATIENT)
Dept: LAB | Facility: MEDICAL CENTER | Age: 36
End: 2021-01-21
Attending: FAMILY MEDICINE
Payer: COMMERCIAL

## 2021-01-21 DIAGNOSIS — R07.9 CHEST PAIN, UNSPECIFIED TYPE: ICD-10-CM

## 2021-01-21 LAB
ALBUMIN SERPL BCP-MCNC: 4.7 G/DL (ref 3.2–4.9)
ALBUMIN/GLOB SERPL: 1.5 G/DL
ALP SERPL-CCNC: 95 U/L (ref 30–99)
ALT SERPL-CCNC: 23 U/L (ref 2–50)
ANION GAP SERPL CALC-SCNC: 6 MMOL/L (ref 7–16)
AST SERPL-CCNC: 27 U/L (ref 12–45)
BILIRUB SERPL-MCNC: 0.3 MG/DL (ref 0.1–1.5)
BUN SERPL-MCNC: 13 MG/DL (ref 8–22)
CALCIUM SERPL-MCNC: 9.7 MG/DL (ref 8.5–10.5)
CHLORIDE SERPL-SCNC: 105 MMOL/L (ref 96–112)
CO2 SERPL-SCNC: 28 MMOL/L (ref 20–33)
CREAT SERPL-MCNC: 0.68 MG/DL (ref 0.5–1.4)
D DIMER PPP IA.FEU-MCNC: <0.27 UG/ML (FEU) (ref 0–0.5)
ERYTHROCYTE [DISTWIDTH] IN BLOOD BY AUTOMATED COUNT: 48.8 FL (ref 35.9–50)
GLOBULIN SER CALC-MCNC: 3.1 G/DL (ref 1.9–3.5)
GLUCOSE SERPL-MCNC: 83 MG/DL (ref 65–99)
HCT VFR BLD AUTO: 43.3 % (ref 37–47)
HGB BLD-MCNC: 14.5 G/DL (ref 12–16)
MCH RBC QN AUTO: 33.1 PG (ref 27–33)
MCHC RBC AUTO-ENTMCNC: 33.5 G/DL (ref 33.6–35)
MCV RBC AUTO: 98.9 FL (ref 81.4–97.8)
MORPHOLOGY BLD-IMP: NORMAL
PLATELET # BLD AUTO: ABNORMAL K/UL (ref 164–446)
PMV BLD AUTO: ABNORMAL FL (ref 9–12.9)
POTASSIUM SERPL-SCNC: 4 MMOL/L (ref 3.6–5.5)
PROT SERPL-MCNC: 7.8 G/DL (ref 6–8.2)
RBC # BLD AUTO: 4.38 M/UL (ref 4.2–5.4)
SODIUM SERPL-SCNC: 139 MMOL/L (ref 135–145)
TROPONIN T SERPL-MCNC: <6 NG/L (ref 6–19)
TSH SERPL DL<=0.005 MIU/L-ACNC: 1.69 UIU/ML (ref 0.38–5.33)
WBC # BLD AUTO: 6.1 K/UL (ref 4.8–10.8)

## 2021-01-21 PROCEDURE — 80053 COMPREHEN METABOLIC PANEL: CPT

## 2021-01-21 PROCEDURE — 85379 FIBRIN DEGRADATION QUANT: CPT

## 2021-01-21 PROCEDURE — 36415 COLL VENOUS BLD VENIPUNCTURE: CPT

## 2021-01-21 PROCEDURE — 84484 ASSAY OF TROPONIN QUANT: CPT

## 2021-01-21 PROCEDURE — 85027 COMPLETE CBC AUTOMATED: CPT

## 2021-01-21 PROCEDURE — 84443 ASSAY THYROID STIM HORMONE: CPT

## 2021-01-21 NOTE — PROGRESS NOTES
FAMILY MEDICINE VISIT                                                               Chief complaint::Diagnoses of Chest pain, unspecified type and Shortness of breath were pertinent to this visit.    History of present illness: Emili Awad is a 35 y.o. female who presented for chest pain and shortness of breath.    Patient had Covid test done on 12/12/2020 which came back positive.  She was then she reports that she is getting some shortness of breath.  She reports that she is not able to do that much exercise which she was able to do before getting Covid infection.  She is having chest pain since yesterday.  She reports that she had persistent chest pain whole day yesterday on the left side of her chest.  Describes pain as a dull pain.  Today she reports that she had that pain in the morning but now does not have any pain.  Denies any palpitations, lower leg swelling.  Reports previous history of low heart rate and had evaluation done which came back negative.    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    Review of systems:     Review of Systems   Constitutional: Negative for chills, fever and malaise/fatigue.   Respiratory: Positive for shortness of breath. Negative for cough, hemoptysis, sputum production and wheezing.    Cardiovascular: Positive for chest pain. Negative for palpitations and leg swelling.   Gastrointestinal: Negative for abdominal pain, blood in stool, constipation, diarrhea, nausea and vomiting.   Musculoskeletal: Negative for myalgias.   Neurological: Negative for dizziness, sensory change, focal weakness and headaches.        Past Medical, Surgical and Family History:    Past Medical History:   Diagnosis Date   • Cardiac arrhythmia    • Chickenpox    • Eczema    • Episodic lightheadedness 6/23/2017   • H. pylori infection 2015     "treated   • Hypokalemia    • IUD (intrauterine device) in place    • Needle stick injury     took medications, pt was tested negative     Past Surgical History:   Procedure Laterality Date   • DENTAL SURGERY      wisdom teeth extraction   • DILATION AND CURETTAGE      D & C, election  in      Family History   Problem Relation Age of Onset   • Diabetes Mother    • Hypertension Father    • Cancer Paternal Uncle         lymphoma        Social History:    Social History     Tobacco Use   • Smoking status: Never Smoker   • Smokeless tobacco: Never Used   Substance Use Topics   • Alcohol use: Yes     Alcohol/week: 0.0 oz     Comment: occasional   • Drug use: No        Medications and Allergies:     Current Outpatient Medications   Medication Sig Dispense Refill   • Ascorbic Acid (VITAMIN C PO) Take  by mouth.     • Cholecalciferol (VITAMIN D PO) Take 1 Tab by mouth every day.     • Multiple Vitamins-Minerals (MULTIVITAMIN PO) Take  by mouth.     • Probiotic Product (PROBIOTIC DAILY PO) Take  by mouth.       No current facility-administered medications for this visit.         No Known Allergies    Vitals:    BP (!) 98/52 (BP Location: Left arm, Patient Position: Sitting, BP Cuff Size: Adult)   Pulse (!) 50   Temp 36.4 °C (97.6 °F)   Resp 20   Ht 1.676 m (5' 6\")   Wt 54.1 kg (119 lb 4.3 oz)   SpO2 100%  Body mass index is 19.25 kg/m².    Physical Exam:     Physical Exam   Constitutional: She is well-developed, well-nourished, and in no distress. No distress.   HENT:   Head: Normocephalic and atraumatic.   Eyes: Conjunctivae are normal.   Neck: Neck supple.   Cardiovascular: Normal rate, regular rhythm and normal heart sounds. Exam reveals no gallop and no friction rub.   No murmur heard.  Pulmonary/Chest: Effort normal and breath sounds normal. No respiratory distress. She has no wheezes. She has no rales.   Musculoskeletal:         General: No deformity or edema.   Neurological: She is alert. Gait normal. "   Psychiatric: Mood, affect and judgment normal.        Assessment/Plan:    Emili was seen today for chest pain.    Diagnoses and all orders for this visit:    Chest pain, unspecified type  -     EKG - Clinic Performed  -     CBC WITHOUT DIFFERENTIAL; Future  -     Comp Metabolic Panel; Future  -     D-DIMER; Future  -     TROPONIN; Future  -     TSH WITH REFLEX TO FT4; Future  -     DX-CHEST-2 VIEWS; Future  -     EC-ECHOCARDIOGRAM COMPLETE W/O CONT; Future    Shortness of breath  -     DX-CHEST-2 VIEWS; Future  -     EC-ECHOCARDIOGRAM COMPLETE W/O CONT; Future       Point-of-care EKG showed sinus bradycardia, heart rate 43, no ST or T wave changes concerning for ischemia.  Patient does a lot of aerobic exercise and previous evaluation as per patient for bradycardia was normal.  Check blood work CBC, CMP, D-dimer, troponin and thyroid function test.  Discussed my differential diagnosis with patient including musculoskeletal pain versus GERD versu pulmonary embolism versus MI.  Check x-ray and echocardiogram.  Discussed with patient that her symptoms are persistent, advised to go to ER.  Advised to start taking aspirin 81 mg once daily    Discussed with patient diagnosis, management options, and risk, benefits and alternative to treatment plan agreed upon.    Please note that this dictation was created using voice recognition software. I have made every reasonable attempt to correct obvious errors, but I expect that there are errors of grammar and possibly content that I did not discover before finalizing the note.    Follow up in 3 weeks for follow-up.

## 2021-01-22 DIAGNOSIS — R06.02 SHORTNESS OF BREATH: ICD-10-CM

## 2021-01-22 DIAGNOSIS — R07.9 CHEST PAIN, UNSPECIFIED TYPE: ICD-10-CM

## 2021-01-22 NOTE — RESULT ENCOUNTER NOTE
Results released to [facility].  Called patient and discussed results with her.  Patient reports that she is having pain occasionally and shortness of breath frequently and would like to do ultrasound and x-ray sooner to find out if any infection in her lungs or any cardiac problems.  Ordered echocardiogram and x-ray to do urgent as patient is still having frequent shortness of breath.

## 2021-01-26 ENCOUNTER — HOSPITAL ENCOUNTER (OUTPATIENT)
Dept: CARDIOLOGY | Facility: MEDICAL CENTER | Age: 36
End: 2021-01-26
Attending: FAMILY MEDICINE
Payer: COMMERCIAL

## 2021-01-26 ENCOUNTER — HOSPITAL ENCOUNTER (OUTPATIENT)
Dept: RADIOLOGY | Facility: MEDICAL CENTER | Age: 36
End: 2021-01-26
Attending: FAMILY MEDICINE
Payer: COMMERCIAL

## 2021-01-26 DIAGNOSIS — R06.02 SHORTNESS OF BREATH: ICD-10-CM

## 2021-01-26 DIAGNOSIS — R07.9 CHEST PAIN, UNSPECIFIED TYPE: ICD-10-CM

## 2021-01-26 LAB
LV EJECT FRACT  99904: 60
LV EJECT FRACT MOD 2C 99903: 65.74
LV EJECT FRACT MOD 4C 99902: 69.77
LV EJECT FRACT MOD BP 99901: 69.74

## 2021-01-26 PROCEDURE — 93306 TTE W/DOPPLER COMPLETE: CPT

## 2021-01-26 PROCEDURE — 93306 TTE W/DOPPLER COMPLETE: CPT | Mod: 26 | Performed by: INTERNAL MEDICINE

## 2021-01-26 PROCEDURE — 71046 X-RAY EXAM CHEST 2 VIEWS: CPT

## 2021-01-29 PROCEDURE — 91301 MODERNA SARS-COV-2 VACCINE: CPT

## 2021-01-29 PROCEDURE — 0012A MODERNA SARS-COV-2 VACCINE: CPT

## 2021-01-31 ENCOUNTER — IMMUNIZATION (OUTPATIENT)
Dept: FAMILY PLANNING/WOMEN'S HEALTH CLINIC | Facility: IMMUNIZATION CENTER | Age: 36
End: 2021-01-31
Payer: COMMERCIAL

## 2021-01-31 DIAGNOSIS — Z23 ENCOUNTER FOR VACCINATION: Primary | ICD-10-CM

## 2021-02-10 ENCOUNTER — APPOINTMENT (OUTPATIENT)
Dept: MEDICAL GROUP | Facility: MEDICAL CENTER | Age: 36
End: 2021-02-10
Payer: COMMERCIAL

## 2021-03-25 ENCOUNTER — TELEPHONE (OUTPATIENT)
Dept: OBGYN | Facility: CLINIC | Age: 36
End: 2021-03-25

## 2021-03-25 NOTE — TELEPHONE ENCOUNTER
Pt called stating that she would like to discuss her options of Birth control/ IUD. Informed Pt that she would have to make an appt to discuss options with provider. Pt understood. I put Pt on hold to transfer to  right when I was going to Transfer. Pt hung up.

## 2021-03-26 ENCOUNTER — PATIENT MESSAGE (OUTPATIENT)
Dept: MEDICAL GROUP | Facility: MEDICAL CENTER | Age: 36
End: 2021-03-26

## 2021-03-26 DIAGNOSIS — M79.673 PAIN OF FOOT, UNSPECIFIED LATERALITY: ICD-10-CM

## 2021-03-26 DIAGNOSIS — M79.671 RIGHT FOOT PAIN: ICD-10-CM

## 2021-04-20 ENCOUNTER — GYNECOLOGY VISIT (OUTPATIENT)
Dept: OBGYN | Facility: CLINIC | Age: 36
End: 2021-04-20
Payer: COMMERCIAL

## 2021-04-20 VITALS
SYSTOLIC BLOOD PRESSURE: 110 MMHG | WEIGHT: 121 LBS | HEIGHT: 66 IN | BODY MASS INDEX: 19.44 KG/M2 | DIASTOLIC BLOOD PRESSURE: 67 MMHG

## 2021-04-20 DIAGNOSIS — Z30.9 ENCOUNTER FOR CONTRACEPTIVE MANAGEMENT, UNSPECIFIED TYPE: ICD-10-CM

## 2021-04-20 PROCEDURE — 99213 OFFICE O/P EST LOW 20 MIN: CPT | Performed by: OBSTETRICS & GYNECOLOGY

## 2021-04-20 NOTE — PROGRESS NOTES
Patient here for GYN exam.  C/o would like to discuss a new IUD   Pt has Yajaira originally placed 2013 and re placed 2015/2016   LMP=  03/19/2021  BCM: Yajaira  Last pap: 05/22/2018  WNL   Phone number: [phone]  Pharmacy verified

## 2021-04-20 NOTE — PROGRESS NOTES
GYN Visit  CC: Questions about IUD    Emili Awad is a 36 y.o.  who presents today for consultation regarding her upcoming IUD insertion appointment.  Patient reports that she is just become concerned about a couple of aspects and wanted to go over the details of having her IUD changed out next week.  She has a ParaGard in place right now and has not liked it as she has had very heavy bleeding.  She plans to trade it for Mirena next week.  Would like to know about pain associated with the procedure and the details surrounding it.  She is also thinking she will start her period and likely be on it when she has the IUD is exchanged and would like to know if that is a problem.   Denies any other concerns or questions today.    OB History:    OB History    Para Term  AB Living   1 0 0 0 1 0   SAB TAB Ectopic Molar Multiple Live Births   0 1 0   0        # Outcome Date GA Lbr Sammy/2nd Weight Sex Delivery Anes PTL Lv   1 TAB                Review of Systems:   Pertinent positives documented in HPI and all other systems reviewed & are negative.    All PMH, PSH, allergies, social history and FH reviewed and updated today:  Past Medical History:  Past Medical History:   Diagnosis Date   • Cardiac arrhythmia    • Chickenpox    • Eczema    • Episodic lightheadedness 2017   • H. pylori infection     treated   • Hypokalemia    • IUD (intrauterine device) in place    • Needle stick injury     took medications, pt was tested negative       Past Surgical History:  Past Surgical History:   Procedure Laterality Date   • DENTAL SURGERY      wisdom teeth extraction   • DILATION AND CURETTAGE      D & C, election  in        Medications:   Current Outpatient Medications Ordered in Epic   Medication Sig Dispense Refill   • Ascorbic Acid (VITAMIN C PO) Take  by mouth.     • Cholecalciferol (VITAMIN D PO) Take 1 Tab by mouth every day.     • Multiple Vitamins-Minerals (MULTIVITAMIN PO) Take  by  mouth.     • Probiotic Product (PROBIOTIC DAILY PO) Take  by mouth.       No current Epic-ordered facility-administered medications on file.       Allergies: Patient has no known allergies.    Social History:  Social History     Socioeconomic History   • Marital status:      Spouse name: Not on file   • Number of children: Not on file   • Years of education: Not on file   • Highest education level: Not on file   Occupational History   • Occupation: mammography tech   Tobacco Use   • Smoking status: Never Smoker   • Smokeless tobacco: Never Used   Substance and Sexual Activity   • Alcohol use: Yes     Alcohol/week: 0.0 oz     Comment: occasional   • Drug use: No   • Sexual activity: Yes     Partners: Male     Birth control/protection: I.U.D.     Comment: Yajaira in 2015, works at [employer]   Other Topics Concern   • Not on file   Social History Narrative    , no children.      Social Determinants of Health     Financial Resource Strain:    • Difficulty of Paying Living Expenses:    Food Insecurity:    • Worried About Running Out of Food in the Last Year:    • Ran Out of Food in the Last Year:    Transportation Needs:    • Lack of Transportation (Medical):    • Lack of Transportation (Non-Medical):    Physical Activity:    • Days of Exercise per Week:    • Minutes of Exercise per Session:    Stress:    • Feeling of Stress :    Social Connections:    • Frequency of Communication with Friends and Family:    • Frequency of Social Gatherings with Friends and Family:    • Attends Church Services:    • Active Member of Clubs or Organizations:    • Attends Club or Organization Meetings:    • Marital Status:    Intimate Partner Violence:    • Fear of Current or Ex-Partner:    • Emotionally Abused:    • Physically Abused:    • Sexually Abused:        Family History:  Family History   Problem Relation Age of Onset   • Diabetes Mother    • Hypertension Father    • Cancer Paternal Uncle         lymphoma  "          Objective:   Vitals:  /67 (BP Location: Left arm, Patient Position: Sitting, BP Cuff Size: Adult)   Ht 1.676 m (5' 6\")   Wt 54.9 kg (121 lb)   Body mass index is 19.53 kg/m². (Goal BM I>18 <25)    Physical Exam:   Nursing note and vitals reviewed.  GENERAL: No acute distress  HENT: Atraumatic, normocephalic  EYES: Extraocular movements intact, pupils equal and reactive to light  NECK: Supple, Full ROM  CHEST: No deformities, Equal chest expansion  RESP: Unlabored, no stridor or audible wheeze  ABD: Soft, Nontender, Non-Distended  Extremities: No Clubbing, Cyanosis, or Edema  Skin: Warm/dry, without rashes  Neuro: A/O x 4, CN 2-12 Grossly intact, Motor/sensory grossly intact  Psych: Normal mood, behavior, and affect     Assessment/Plan:   Emili Awad is a 36 y.o.  female who presents for:    No diagnosis found.  #Contraceptive management.  Details of IUD removal and replacement were addressed with patient today.  We discussed differences between ParaGard and Mirena.  We discussed medications used pre procedure to potentially soften cervix however if she is on her menses she will likely not need that medication.  Discussed that if it were to not work then we would potentially advised that medication.  Did recommend that she take 800 mg of ibuprofen about 2 hours prior to the procedure.  At the conclusion of our conversation she reports understanding and that she now feels much more comfortable with the plan procedures next week.  #Follow up.  RTC next week for IUD appointment or sooner if concerns or questions arise.    This encounter took 15 minutes of which > 50% of time was spent on face-to-face counseling and coordination of care regarding the above.    Please note that this note was created using voice recognition software. I have made every reasonable attempt to correct obvious errors, but expect that there are errors of grammar and possibly of content that I did not discover before " finalizing note.

## 2021-04-22 ENCOUNTER — TELEPHONE (OUTPATIENT)
Dept: MEDICAL GROUP | Facility: MEDICAL CENTER | Age: 36
End: 2021-04-22

## 2021-04-22 NOTE — TELEPHONE ENCOUNTER
ESTABLISHED PATIENT PRE-VISIT PLANNING     Patient was NOT contacted to complete PVP.     Note: Patient will not be contacted if there is no indication to call.     1.  Reviewed notes from the last few office visits within the medical group: Yes    2.  If any orders were placed at last visit or intended to be done for this visit (i.e. 6 mos follow-up), do we have Results/Consult Notes?         •  Labs - Labs ordered, completed on 01/21/21 and results are in chart.  Note: If patient appointment is for lab review and patient did not complete labs, check with provider if OK to reschedule patient until labs completed.       •  Imaging - Imaging was not ordered at last office visit.       •  Referrals - No referrals were ordered at last office visit.    3. Is this appointment scheduled as a Hospital Follow-Up? No    4.  Immunizations were updated in Epic using Reconcile Outside Information activity? Yes    5.  Patient is due for the following Health Maintenance Topics:   Health Maintenance Due   Topic Date Due   • PAP SMEAR  05/22/2021

## 2021-04-26 ENCOUNTER — GYNECOLOGY VISIT (OUTPATIENT)
Dept: OBGYN | Facility: CLINIC | Age: 36
End: 2021-04-26
Payer: COMMERCIAL

## 2021-04-26 VITALS — DIASTOLIC BLOOD PRESSURE: 61 MMHG | SYSTOLIC BLOOD PRESSURE: 92 MMHG | WEIGHT: 121 LBS | BODY MASS INDEX: 19.53 KG/M2

## 2021-04-26 DIAGNOSIS — Z30.433 ENCOUNTER FOR REMOVAL AND REINSERTION OF INTRAUTERINE CONTRACEPTIVE DEVICE (IUD): ICD-10-CM

## 2021-04-26 LAB
INT CON NEG: NORMAL
INT CON POS: NORMAL
POC URINE PREGNANCY TEST: NEGATIVE

## 2021-04-26 PROCEDURE — 81025 URINE PREGNANCY TEST: CPT | Performed by: OBSTETRICS & GYNECOLOGY

## 2021-04-26 PROCEDURE — 58300 INSERT INTRAUTERINE DEVICE: CPT | Mod: 59 | Performed by: OBSTETRICS & GYNECOLOGY

## 2021-04-26 PROCEDURE — 58301 REMOVE INTRAUTERINE DEVICE: CPT | Performed by: OBSTETRICS & GYNECOLOGY

## 2021-04-26 NOTE — PROCEDURES
IUD removal:    Speculum placed in the vagina and cervix visualized. IUD strings seen. IUD strings grasped with ring forceps and removed intact. Hemostasis noted. Pt tolerated procedure well.    36 y.o.    Female presents here today for her IUD insertion:     No LMP recorded. Patient has had an implant.      Today the patient is counseled on the risks of IUD insertion. I also discussed with the patient the risk of infection on insertion, and had asked the patient to remain on pelvic rest for one week following the insertion. We also discussed the risk of IUD expulsion, the risk of uterine perforation and IUD migration. If the IUD does migrate the patient may require a separate procedure such as a laparoscopy to retrieve the migrated IUD. I also discussed the 1% risk of pregnancy with IUD use. Also discussed with patient today increased risk of ectopic pregnancy with IUD use. Discussed specific side effects of ParaGard IUD which can be increased vaginal bleeding during menses or increased dysmenorrhea. Also discussed today the possibility that IUD may need to be removed secondary to bleeding profile or pain. Also discussed were the possibility that partner can feel the IUD during intercourse. I also discussed the side effects of Mirena which can be amenorrhea or dysfunctional uterine bleeding or spotting.  Patient had the opportunity to ask questions regarding insertion, risks and benefits, all questions are answered in their entirety.  Informed consent is signed.    Procedure note  Urine pregnancy test is negative, informed consent was previously signed  The bimanual exam is performed the uterus is noted to be 7 weeks in size and is mid position  A speculum was inserted into the vagina, the cervix was cleansed with Betadine swabs x3  The uterus was sounded to a depth of 8 centimeters  The IUD is placed under sterile conditions, without complications  The strings trimmed to approximately 3 cm  Good hemostasis    The patient tolerated the procedure well    Aftercare discussed. Patient is asked to refrain from coitus or use backup method for 7 days after insertion. She is to return for fever, worsening pelvic pain, abdominal pain, syncope, unusually heavy vaginal bleeding, suspected expulsion, foul smelling vaginal discharge, or pregnancy-like symptoms. If the patient is comfortable she may also perform a digital self examination to check for the strings, although this is not required.    Patient is asked to follow up in 4 to 6 weeks for IUD check.

## 2021-04-26 NOTE — NON-PROVIDER
Pt here for Mirena removal with reinsertion  Pt states no complaints  Good#[phone]  Pharmacy verified

## 2021-04-30 ENCOUNTER — OFFICE VISIT (OUTPATIENT)
Dept: MEDICAL GROUP | Facility: MEDICAL CENTER | Age: 36
End: 2021-04-30
Payer: COMMERCIAL

## 2021-04-30 VITALS
OXYGEN SATURATION: 98 % | RESPIRATION RATE: 20 BRPM | DIASTOLIC BLOOD PRESSURE: 60 MMHG | HEART RATE: 45 BPM | TEMPERATURE: 97.7 F | WEIGHT: 121.25 LBS | BODY MASS INDEX: 19.49 KG/M2 | HEIGHT: 66 IN | SYSTOLIC BLOOD PRESSURE: 100 MMHG

## 2021-04-30 DIAGNOSIS — L03.011 PARONYCHIA OF RIGHT MIDDLE FINGER: ICD-10-CM

## 2021-04-30 DIAGNOSIS — L60.8 NAIL DISCOLORATION: ICD-10-CM

## 2021-04-30 PROCEDURE — 99214 OFFICE O/P EST MOD 30 MIN: CPT | Performed by: FAMILY MEDICINE

## 2021-04-30 RX ORDER — AMOXICILLIN AND CLAVULANATE POTASSIUM 875; 125 MG/1; MG/1
1 TABLET, FILM COATED ORAL 2 TIMES DAILY
Qty: 10 TABLET | Refills: 0 | Status: SHIPPED | OUTPATIENT
Start: 2021-04-30 | End: 2021-05-05

## 2021-04-30 RX ORDER — CICLOPIROX 80 MG/ML
SOLUTION TOPICAL
Qty: 6.6 ML | Refills: 1 | Status: SHIPPED | OUTPATIENT
Start: 2021-04-30 | End: 2021-11-23

## 2021-04-30 ASSESSMENT — ENCOUNTER SYMPTOMS
CHILLS: 0
FEVER: 0

## 2021-04-30 NOTE — PROGRESS NOTES
FAMILY MEDICINE VISIT                                                               Chief complaint::Diagnoses of Paronychia of right middle finger and Nail discoloration were pertinent to this visit.    History of present illness: Emili Awad is a 36 y.o. female who presented for acute infection of nail and nail discoloration.    She has been having nail discoloration since few months.  Some of the nails are broken and there is some discoloration over all of her fingernails.  She was previously having artificial nails but currently does not have any.  She is also having swelling over nailbed of her right middle finger.  She has been using over-the-counter medications and applying bandage but that has been not helping her.  She is having pain and redness at that region.  No pustular drainage.    Review of systems:     Review of Systems   Constitutional: Negative for chills, fever and malaise/fatigue.   Skin:        Nail discoloration of fingernails bilaterally  Redness and swelling of right middle finger nailbed        Past Medical, Surgical and Family History:    Past Medical History:   Diagnosis Date   • Cardiac arrhythmia    • Chickenpox    • Eczema    • Episodic lightheadedness 2017   • H. pylori infection     treated   • Hypokalemia    • IUD (intrauterine device) in place    • Needle stick injury     took medications, pt was tested negative     Past Surgical History:   Procedure Laterality Date   • DENTAL SURGERY      wisdom teeth extraction   • DILATION AND CURETTAGE      D & C, election  in      Family History   Problem Relation Age of Onset   • Diabetes Mother    • Hypertension Father    • Cancer Paternal Uncle         lymphoma        Social History:    Social History     Tobacco Use   • Smoking status: Never Smoker   • Smokeless tobacco: Never Used   Substance Use Topics   • Alcohol use: Yes     Alcohol/week: 0.0 oz     Comment: occasional   • Drug use: No        Medications and  "Allergies:     Current Outpatient Medications   Medication Sig Dispense Refill   • amoxicillin-clavulanate (AUGMENTIN) 875-125 MG Tab Take 1 tablet by mouth 2 times a day for 5 days. 10 tablet 0   • ciclopirox (PENLAC) 8 % solution Apply thin over nails daily 6.6 mL 1   • Ascorbic Acid (VITAMIN C PO) Take  by mouth.     • Cholecalciferol (VITAMIN D PO) Take 1 Tab by mouth every day.     • Multiple Vitamins-Minerals (MULTIVITAMIN PO) Take  by mouth.     • Probiotic Product (PROBIOTIC DAILY PO) Take  by mouth.       No current facility-administered medications for this visit.        No Known Allergies    Vitals:    /60 (BP Location: Left arm, Patient Position: Sitting, BP Cuff Size: Adult)   Pulse (!) 45   Temp 36.5 °C (97.7 °F) (Temporal)   Resp 20   Ht 1.676 m (5' 6\")   Wt 55 kg (121 lb 4.1 oz)   SpO2 98%  Body mass index is 19.57 kg/m².    Physical Exam:     Physical Exam   Constitutional: She is well-developed, well-nourished, and in no distress. No distress.   HENT:   Head: Normocephalic and atraumatic.   Eyes: Conjunctivae are normal.   Cardiovascular: Normal rate.   Pulmonary/Chest: Effort normal. No respiratory distress.   Musculoskeletal:         General: No deformity or edema.      Cervical back: Neck supple.   Neurological: She is alert. Gait normal.   Skin: No rash noted.   Erythema, swelling over right middle finger nailbed, no visible drainage, no fluctuance.  Nail discoloration over all the nails bilaterally.  Some of them are whitish discoloration and some of them are mildly yellowish.   Psychiatric: Mood, affect and judgment normal.          Assessment/Plan:    1. Paronychia of right middle finger  New health problem, uncontrolled.  Start Augmentin 1 tablet twice daily for 5 days.  Advised to do soaking in warm water 3-4 times a day.    - amoxicillin-clavulanate (AUGMENTIN) 875-125 MG Tab; Take 1 tablet by mouth 2 times a day for 5 days.  Dispense: 10 tablet; Refill: 0    2. Nail " discoloration  New health problem, uncontrolled.  Appears fungal infection, will try Penlac nail solution, if not getting better, will refer to dermatology.  Advised to use Vaseline around her nails as there is dryness around that area and cracks.    - ciclopirox (PENLAC) 8 % solution; Apply thin over nails daily  Dispense: 6.6 mL; Refill: 1      Please note that this dictation was created using voice recognition software. I have made every reasonable attempt to correct obvious errors, but I expect that there are errors of grammar and possibly content that I did not discover before finalizing the note.    Follow up as needed if symptoms not getting better.

## 2021-05-17 ENCOUNTER — HOSPITAL ENCOUNTER (OUTPATIENT)
Dept: LAB | Facility: MEDICAL CENTER | Age: 36
End: 2021-05-17
Attending: EMERGENCY MEDICINE
Payer: COMMERCIAL

## 2021-05-17 ENCOUNTER — TELEPHONE (OUTPATIENT)
Dept: OBGYN | Facility: CLINIC | Age: 36
End: 2021-05-17

## 2021-05-17 NOTE — TELEPHONE ENCOUNTER
Patient called because she is having some bleeding after having IUD inserted 04/26/21 pt states that it was just spotting and now like a period advised pt that she can have unscheduled bleeding up to 6 months after insertion. Advised pt that she can take up to 800 mg of ibuprofen every 6 hours to help with the bleeding. Then patient states that she was  Nauseas and feeling fatigue. Advised pt that for those sx she should be seen at PCP pt understood no further questions.

## 2021-05-18 ENCOUNTER — PATIENT MESSAGE (OUTPATIENT)
Dept: MEDICAL GROUP | Facility: MEDICAL CENTER | Age: 36
End: 2021-05-18

## 2021-05-18 DIAGNOSIS — R11.0 NAUSEA: ICD-10-CM

## 2021-05-18 RX ORDER — ONDANSETRON 4 MG/1
4 TABLET, FILM COATED ORAL EVERY 6 HOURS PRN
Qty: 20 TABLET | Refills: 0 | Status: SHIPPED
Start: 2021-05-18 | End: 2021-11-23

## 2021-05-19 ENCOUNTER — PATIENT MESSAGE (OUTPATIENT)
Dept: MEDICAL GROUP | Facility: MEDICAL CENTER | Age: 36
End: 2021-05-19

## 2021-05-24 ENCOUNTER — GYNECOLOGY VISIT (OUTPATIENT)
Dept: OBGYN | Facility: CLINIC | Age: 36
End: 2021-05-24
Payer: COMMERCIAL

## 2021-05-24 VITALS — SYSTOLIC BLOOD PRESSURE: 101 MMHG | DIASTOLIC BLOOD PRESSURE: 59 MMHG | BODY MASS INDEX: 19.21 KG/M2 | WEIGHT: 119 LBS

## 2021-05-24 DIAGNOSIS — Z30.431 IUD CHECK UP: ICD-10-CM

## 2021-05-24 PROCEDURE — 99213 OFFICE O/P EST LOW 20 MIN: CPT | Performed by: OBSTETRICS & GYNECOLOGY

## 2021-05-24 RX ORDER — ACETAMINOPHEN AND CODEINE PHOSPHATE 120; 12 MG/5ML; MG/5ML
1 SOLUTION ORAL DAILY
Qty: 90 TABLET | Refills: 3 | Status: SHIPPED | OUTPATIENT
Start: 2021-05-24 | End: 2022-12-27

## 2021-05-24 NOTE — PROGRESS NOTES
Chief Complaint   Patient presents with   • Gynecologic Exam       History of present illness: 36 y.o. presents with above chief complaint. Pt had Mirena IUD placed without any complications and presents for an IUD check up. She reports daily bleeding, some mild pain, denies discomfort with intercourse, denies discharge. Denies fever, chills, nausea, vomiting. Overall very happy with device.    Review of systems:  Pertinent positives documented in HPI and all other systems reviewed & are negative      All PMH, PSH, allergies, social history and FH reviewed and updated today:  Past Medical History:   Diagnosis Date   • Cardiac arrhythmia    • Chickenpox    • Eczema    • Episodic lightheadedness 2017   • H. pylori infection     treated   • Hypokalemia    • IUD (intrauterine device) in place    • Needle stick injury     took medications, pt was tested negative       Past Surgical History:   Procedure Laterality Date   • DENTAL SURGERY      wisdom teeth extraction   • DILATION AND CURETTAGE      D & C, election  in        Allergies: No Known Allergies    Social History     Socioeconomic History   • Marital status:      Spouse name: Not on file   • Number of children: Not on file   • Years of education: Not on file   • Highest education level: Not on file   Occupational History   • Occupation: mammography tech   Tobacco Use   • Smoking status: Never Smoker   • Smokeless tobacco: Never Used   Vaping Use   • Vaping Use: Never used   Substance and Sexual Activity   • Alcohol use: Yes     Alcohol/week: 0.0 oz     Comment: occasional   • Drug use: No   • Sexual activity: Yes     Partners: Male     Birth control/protection: I.U.D.     Comment: Yajaira in , works at [employer]   Other Topics Concern   • Not on file   Social History Narrative    , no children.      Social Determinants of Health     Financial Resource Strain:    • Difficulty of Paying Living Expenses:    Food  Insecurity:    • Worried About Running Out of Food in the Last Year:    • Ran Out of Food in the Last Year:    Transportation Needs:    • Lack of Transportation (Medical):    • Lack of Transportation (Non-Medical):    Physical Activity:    • Days of Exercise per Week:    • Minutes of Exercise per Session:    Stress:    • Feeling of Stress :    Social Connections:    • Frequency of Communication with Friends and Family:    • Frequency of Social Gatherings with Friends and Family:    • Attends Holiness Services:    • Active Member of Clubs or Organizations:    • Attends Club or Organization Meetings:    • Marital Status:    Intimate Partner Violence:    • Fear of Current or Ex-Partner:    • Emotionally Abused:    • Physically Abused:    • Sexually Abused:        Family History   Problem Relation Age of Onset   • Diabetes Mother    • Hypertension Father    • Cancer Paternal Uncle         lymphoma       Physical exam:  /59   Wt 54 kg (119 lb)     GENERAL APPEARANCE: healthy, alert, no distress, cooperative, smiling  NECK nontender, no masses, thyromegaly or nodules  ABDOMEN Abdomen soft, non-tender. BS normal. No masses,  No organomegaly  FEMALE GYN: normal female external genitalia without lesions, clear vaginal discharge noted, vulva pink without erythema or friability, urethra is normal without discharge or scarring, no bladder fullness or masses, normal vagina and normal vaginal tone, normal cervix, normal  uterus, size and consistency, IUD strings seen and palpated with normal length of strings, normal anus and perineum.  EXTREMITIES:negative clubbing, cyanosis, edema    NEURO Awake, alert and oriented x 3, Normal gait, no sensory deficits  SKIN No rashes, or ulcers or lesions seen  PSYCHIATRIC: Patient shows appropriate affect, is alert and oriented x3, intact judgment and insight.      1. IUD check up       IUD in appropriate location  Micronor for daily spotting that is bothersome for her  Spent 20  minutes in face-to-face patient contact in which greater than 50% of that visit was spent in counseling/coordination of care of IUD and normal menstrual irregularity side effects. Reminded patient to check for strings monthly and to call the office if IUD has fallen out or any other problems should arise. Also reminded patient IUD expires in 6 years.  Follow up yearly for annual exam or sooner as needed.

## 2021-08-02 ENCOUNTER — OFFICE VISIT (OUTPATIENT)
Dept: MEDICAL GROUP | Facility: MEDICAL CENTER | Age: 36
End: 2021-08-02
Payer: COMMERCIAL

## 2021-08-02 VITALS
SYSTOLIC BLOOD PRESSURE: 106 MMHG | DIASTOLIC BLOOD PRESSURE: 58 MMHG | WEIGHT: 124.56 LBS | BODY MASS INDEX: 20.02 KG/M2 | HEIGHT: 66 IN | OXYGEN SATURATION: 88 % | HEART RATE: 55 BPM | TEMPERATURE: 98.4 F

## 2021-08-02 DIAGNOSIS — R10.31 RIGHT GROIN PAIN: ICD-10-CM

## 2021-08-02 PROCEDURE — 99213 OFFICE O/P EST LOW 20 MIN: CPT | Performed by: FAMILY MEDICINE

## 2021-08-02 NOTE — PROGRESS NOTES
"History of Present Illness  36 year old female presents to clinic for right lower quadrant/groin pain that started Friday, 7/30/2021.  The day before she had been doing exercises that were heavily focused on her hips.  The pain is intermittent, coming with certain movements, described as stabbing.  The pain has gradually been improving since Friday.  She has been using Tylenol for the pain which does help resolve it.  Bowel movements are regular, daily and soft.  No change in urinary urgency, frequency, or dysuria.  She has a Mirena IUD in place.  She does have her appendix.  She remains afebrile.    She denies any other questions or concerns at this time.    Current problem list, current medication, and past medical/surgical history were reviewed.     ROS  See HPI    Physical Exam  /58 (BP Location: Left arm, Patient Position: Sitting, BP Cuff Size: Adult)   Pulse (!) 55   Temp 36.9 °C (98.4 °F) (Temporal)   Ht 1.676 m (5' 6\")   Wt 56.5 kg (124 lb 9 oz)   SpO2 88%   BMI 20.10 kg/m²   Physical Exam  Constitutional:       General: She is not in acute distress.     Appearance: She is not diaphoretic.   Cardiovascular:      Rate and Rhythm: Normal rate and regular rhythm.      Heart sounds: Normal heart sounds.   Pulmonary:      Effort: Pulmonary effort is normal. No respiratory distress.      Breath sounds: Normal breath sounds.   Abdominal:      General: Bowel sounds are normal.      Palpations: Abdomen is soft.      Tenderness: There is no abdominal tenderness. There is no right CVA tenderness, left CVA tenderness or guarding. Negative signs include Longo's sign, Rovsing's sign, McBurney's sign, psoas sign and obturator sign.   Skin:     General: Skin is warm and dry.   Neurological:      Mental Status: She is alert.   Psychiatric:         Mood and Affect: Affect normal.         Judgment: Judgment normal.       Assessment & Plan  1. Right groin pain  Likely right groin sprain.  Patient was encouraged to " continue with Tylenol, ibuprofen, and heat as needed.    Return if symptoms worsen or fail to improve.    Thania Lacy M.D.

## 2021-11-23 ENCOUNTER — OFFICE VISIT (OUTPATIENT)
Dept: URGENT CARE | Facility: CLINIC | Age: 36
End: 2021-11-23
Payer: COMMERCIAL

## 2021-11-23 ENCOUNTER — HOSPITAL ENCOUNTER (OUTPATIENT)
Facility: MEDICAL CENTER | Age: 36
End: 2021-11-23
Attending: PHYSICIAN ASSISTANT
Payer: COMMERCIAL

## 2021-11-23 VITALS
HEART RATE: 58 BPM | SYSTOLIC BLOOD PRESSURE: 110 MMHG | RESPIRATION RATE: 18 BRPM | HEIGHT: 66 IN | BODY MASS INDEX: 18.96 KG/M2 | DIASTOLIC BLOOD PRESSURE: 58 MMHG | WEIGHT: 118 LBS | TEMPERATURE: 98.2 F | OXYGEN SATURATION: 98 %

## 2021-11-23 DIAGNOSIS — B34.9 NONSPECIFIC SYNDROME SUGGESTIVE OF VIRAL ILLNESS: ICD-10-CM

## 2021-11-23 LAB — COVID ORDER STATUS COVID19: NORMAL

## 2021-11-23 PROCEDURE — 99214 OFFICE O/P EST MOD 30 MIN: CPT | Mod: CS | Performed by: PHYSICIAN ASSISTANT

## 2021-11-23 PROCEDURE — U0005 INFEC AGEN DETEC AMPLI PROBE: HCPCS

## 2021-11-23 PROCEDURE — U0003 INFECTIOUS AGENT DETECTION BY NUCLEIC ACID (DNA OR RNA); SEVERE ACUTE RESPIRATORY SYNDROME CORONAVIRUS 2 (SARS-COV-2) (CORONAVIRUS DISEASE [COVID-19]), AMPLIFIED PROBE TECHNIQUE, MAKING USE OF HIGH THROUGHPUT TECHNOLOGIES AS DESCRIBED BY CMS-2020-01-R: HCPCS

## 2021-11-23 RX ORDER — AZELASTINE 1 MG/ML
1 SPRAY, METERED NASAL 2 TIMES DAILY
Qty: 30 ML | Refills: 0 | Status: SHIPPED | OUTPATIENT
Start: 2021-11-23 | End: 2022-12-27

## 2021-11-23 RX ORDER — ACETAMINOPHEN 325 MG/1
650 TABLET ORAL EVERY 4 HOURS PRN
COMMUNITY
End: 2022-12-27

## 2021-11-23 RX ORDER — DEXTROMETHORPHAN HYDROBROMIDE AND PROMETHAZINE HYDROCHLORIDE 15; 6.25 MG/5ML; MG/5ML
5 SYRUP ORAL EVERY 6 HOURS PRN
Qty: 118 ML | Refills: 0 | Status: SHIPPED | OUTPATIENT
Start: 2021-11-23 | End: 2021-11-30

## 2021-11-23 ASSESSMENT — ENCOUNTER SYMPTOMS
DIARRHEA: 0
EYE PAIN: 0
MYALGIAS: 0
ABDOMINAL PAIN: 0
NAUSEA: 0
VOMITING: 0
COUGH: 1
CHILLS: 0
SHORTNESS OF BREATH: 0
HEADACHES: 0
CONSTIPATION: 0
SORE THROAT: 1
FEVER: 0

## 2021-11-23 NOTE — PROGRESS NOTES
"Subjective:   Emili Awad is a 36 y.o. female who presents for Cough (congestion, runny nose x 4-5 days, had a headache and abdominal pain 1 week ago, did 2 home tests, last one: Negative 4 days ago)      36-year-old female presents complaining of around 1 week of progressive symptoms that include cough, congestion, runny nose with headache and malaise/fatigue.  She is not noted fever or body aches.  Symptoms do not seem to be worsening however persistent.  She is had disrupted sleep both from the postnasal drip as well as the cough.  She is been taking DayQuil and NyQuil with minimal relief of symptoms.  She has no known sick contacts, she is fully vaccinated against Covid, and denies any dyspnea      Review of Systems   Constitutional: Positive for malaise/fatigue. Negative for chills and fever.   HENT: Positive for congestion and sore throat. Negative for ear pain.    Eyes: Negative for pain.   Respiratory: Positive for cough. Negative for shortness of breath.    Cardiovascular: Negative for chest pain.   Gastrointestinal: Negative for abdominal pain, constipation, diarrhea, nausea and vomiting.   Genitourinary: Negative for dysuria.   Musculoskeletal: Negative for myalgias.   Skin: Negative for rash.   Neurological: Negative for headaches.       Medications, Allergies, and current problem list reviewed today in Epic.     Objective:     /58 (BP Location: Left arm, Patient Position: Sitting, BP Cuff Size: Adult)   Pulse (!) 58   Temp 36.8 °C (98.2 °F) (Temporal)   Resp 18   Ht 1.676 m (5' 6\")   Wt 53.5 kg (118 lb)   SpO2 98%     Physical Exam  Vitals reviewed.   Constitutional:       Appearance: Normal appearance. She is not ill-appearing or toxic-appearing.   HENT:      Head: Normocephalic and atraumatic.      Right Ear: Tympanic membrane, ear canal and external ear normal.      Left Ear: Tympanic membrane, ear canal and external ear normal.      Nose: Congestion and rhinorrhea present.      " Mouth/Throat:      Mouth: Mucous membranes are moist.      Pharynx: No oropharyngeal exudate or posterior oropharyngeal erythema.      Comments: POST NASAL DRIP  Eyes:      Conjunctiva/sclera: Conjunctivae normal.   Cardiovascular:      Rate and Rhythm: Normal rate and regular rhythm.   Pulmonary:      Effort: Pulmonary effort is normal.      Breath sounds: Normal breath sounds.   Lymphadenopathy:      Cervical: No cervical adenopathy.   Skin:     General: Skin is warm and dry.      Capillary Refill: Capillary refill takes less than 2 seconds.   Neurological:      Mental Status: She is alert and oriented to person, place, and time.         Assessment/Plan:     Diagnosis and associated orders:     1. Nonspecific syndrome suggestive of viral illness  promethazine-dextromethorphan (PROMETHAZINE-DM) 6.25-15 MG/5ML syrup    azelastine (ASTELIN) 137 MCG/SPRAY nasal spray    COVID/SARS CoV-2 PCR      Comments/MDM:     • Trial of nighttime cough syrup, patient cautioned about sedation.  Recommend trial of a Stehlin given her postnasal drip and significant rhinitis are so bothersome.  Discussed supportive care, patient's constellation of symptoms likely represents a viral URI that is been slightly prolonged.  No signs of secondary bacterial infection at this point.  We will perform Covid PCR testing, patient's had negative antigen tests but for reassurance and accuracy I recommend we perform this and have patient isolate until results available.  I also recommend she contact infection prevention at extension [facility] in order to get clearance to return to work         Differential diagnosis, natural history, supportive care, and indications for immediate follow-up discussed.    Advised the patient to follow-up with the primary care physician for recheck, reevaluation, and consideration of further management.    Please note that this dictation was created using voice recognition software. I have made a reasonable attempt to  correct obvious errors, but I expect that there are errors of grammar and possibly content that I did not discover before finalizing the note.    This note was electronically signed by Nestor Villafana PA-C

## 2021-11-24 LAB
SARS-COV-2 RNA RESP QL NAA+PROBE: NOTDETECTED
SPECIMEN SOURCE: NORMAL

## 2022-04-01 ENCOUNTER — APPOINTMENT (OUTPATIENT)
Dept: MEDICAL GROUP | Facility: MEDICAL CENTER | Age: 37
End: 2022-04-01
Payer: COMMERCIAL

## 2022-05-25 ENCOUNTER — APPOINTMENT (OUTPATIENT)
Dept: MEDICAL GROUP | Facility: MEDICAL CENTER | Age: 37
End: 2022-05-25
Payer: COMMERCIAL

## 2022-06-04 ENCOUNTER — OFFICE VISIT (OUTPATIENT)
Dept: URGENT CARE | Facility: PHYSICIAN GROUP | Age: 37
End: 2022-06-04
Payer: COMMERCIAL

## 2022-06-04 ENCOUNTER — HOSPITAL ENCOUNTER (OUTPATIENT)
Dept: RADIOLOGY | Facility: MEDICAL CENTER | Age: 37
End: 2022-06-04
Attending: PHYSICIAN ASSISTANT
Payer: COMMERCIAL

## 2022-06-04 VITALS
BODY MASS INDEX: 20.09 KG/M2 | TEMPERATURE: 97.2 F | DIASTOLIC BLOOD PRESSURE: 82 MMHG | RESPIRATION RATE: 20 BRPM | WEIGHT: 125 LBS | HEART RATE: 54 BPM | HEIGHT: 66 IN | SYSTOLIC BLOOD PRESSURE: 114 MMHG | OXYGEN SATURATION: 100 %

## 2022-06-04 DIAGNOSIS — R10.12 ACUTE LUQ PAIN: ICD-10-CM

## 2022-06-04 DIAGNOSIS — S39.91XA INJURY OF ABDOMEN, INITIAL ENCOUNTER: ICD-10-CM

## 2022-06-04 DIAGNOSIS — S30.1XXA CONTUSION OF ABDOMINAL WALL, INITIAL ENCOUNTER: ICD-10-CM

## 2022-06-04 PROCEDURE — 99213 OFFICE O/P EST LOW 20 MIN: CPT | Performed by: PHYSICIAN ASSISTANT

## 2022-06-04 PROCEDURE — 74150 CT ABDOMEN W/O CONTRAST: CPT

## 2022-06-04 ASSESSMENT — ENCOUNTER SYMPTOMS
NAUSEA: 0
EYE REDNESS: 0
COUGH: 0
FEVER: 0
SHORTNESS OF BREATH: 0
EYE DISCHARGE: 0
VOMITING: 0
DIARRHEA: 0
ABDOMINAL PAIN: 1
POLYDIPSIA: 0

## 2022-06-04 NOTE — PROGRESS NOTES
Subjective     Emili Awad is a 37 y.o. female who presents with Abdominal Injury and Abdominal Pain (Candelaria jumped on patient 2 nights ago, feels tender ULQ,  when she moves certain ways painful. -*+)            HPI    This is a new problem.   The patient presents to clinic complaining of pain to her left upper abdomen x2 days.  The patient states on Thursday evening she was laying on the floor when her dog jumped off of the bed landing on her left upper abdomen.  The patient states her dog weighs approximately 35-40 pounds.  The patient states she developed pain to her left upper abdomen following the injury.  The patient reports no associated swelling or bruising.  The patient states she is not experiencing any radiation of pain.  The patient also reports no associated rib pain.  No fever.  No nausea/vomiting.  No diarrhea.  No urinary symptoms.  No hematuria.  The patient is taken OTC ibuprofen for her current symptoms.  She is also applied ice to the injured area.    PMH:  has a past medical history of Cardiac arrhythmia, Chickenpox, Eczema, Episodic lightheadedness (6/23/2017), H. pylori infection (2015), Hypokalemia, IUD (intrauterine device) in place, and Needle stick injury.    She has no past medical history of Addisons disease ([facility]), Adrenal disorder ([facility]), Allergy, Anemia, Anxiety, Arrhythmia, Arthritis, Asthma, Blood transfusion without reported diagnosis, Cancer ([facility]), Cataract, CHF (congestive heart failure) ([facility]), Clotting disorder ([facility]), COPD (chronic obstructive pulmonary disease) ([facility]), Cushings syndrome ([facility]), Depression, Diabetes ([facility]), Diabetic neuropathy ([facility]), GERD (gastroesophageal reflux disease), Glaucoma, Goiter, Head ache, Heart attack ([facility]), Heart murmur, HIV (human immunodeficiency virus infection) ([facility]), Hyperlipidemia, Hypertension, IBD (inflammatory bowel disease), Kidney disease, Meningitis, Migraine, Muscle disorder, Osteoporosis, Parathyroid disorder ([facility]), Pituitary disease ([facility]),  Pulmonary emphysema ([facility]), Seizure (HCC), Sickle cell disease (HCC), Stroke (HCC), Substance abuse ([facility]), Thyroid disease, Tuberculosis, or Urinary tract infection.  MEDS:   Current Outpatient Medications:   •  Ascorbic Acid (VITAMIN C PO), Take  by mouth., Disp: , Rfl:   •  Cholecalciferol (VITAMIN D PO), Take 1 Tab by mouth every day., Disp: , Rfl:   •  Multiple Vitamins-Minerals (MULTIVITAMIN PO), Take  by mouth., Disp: , Rfl:   •  Probiotic Product (PROBIOTIC DAILY PO), Take  by mouth., Disp: , Rfl:   •  Pseudoeph-Doxylamine-DM-APAP (DAYQUIL/NYQUIL COLD/FLU RELIEF PO), Take  by mouth. (Patient not taking: Reported on 2022), Disp: , Rfl:   •  acetaminophen (TYLENOL) 325 MG Tab, Take 650 mg by mouth every four hours as needed., Disp: , Rfl:   •  azelastine (ASTELIN) 137 MCG/SPRAY nasal spray, Administer 1 Spray into affected nostril(S) 2 times a day. (Patient not taking: Reported on 2022), Disp: 30 mL, Rfl: 0  •  norethindrone (MICRONOR) 0.35 MG tablet, Take 1 tablet by mouth every day. (Patient not taking: No sig reported), Disp: 90 tablet, Rfl: 3  ALLERGIES: No Known Allergies  SURGHX:   Past Surgical History:   Procedure Laterality Date   • DENTAL SURGERY      wisdom teeth extraction   • DILATION AND CURETTAGE      D & C, election  in      SOCHX:  reports that she has never smoked. She has never used smokeless tobacco. She reports current alcohol use. She reports that she does not use drugs.  FH: Family history was reviewed, no pertinent findings to report      Review of Systems   Constitutional: Negative for fever.   HENT: Negative for congestion.    Eyes: Negative for discharge and redness.   Respiratory: Negative for cough and shortness of breath.    Cardiovascular: Negative for chest pain.   Gastrointestinal: Positive for abdominal pain. Negative for diarrhea, nausea and vomiting.   Genitourinary: Negative for dysuria and hematuria.   Endo/Heme/Allergies: Negative for polydipsia.         "      Objective     /82   Pulse (!) 54   Temp 36.2 °C (97.2 °F) (Tympanic)   Resp 20   Ht 1.676 m (5' 6\")   Wt 56.7 kg (125 lb)   SpO2 100%   BMI 20.18 kg/m²      Physical Exam  Constitutional:       General: She is not in acute distress.     Appearance: Normal appearance. She is well-developed. She is not ill-appearing.   HENT:      Head: Normocephalic and atraumatic.      Right Ear: External ear normal.      Left Ear: External ear normal.   Eyes:      Extraocular Movements: Extraocular movements intact.      Conjunctiva/sclera: Conjunctivae normal.   Cardiovascular:      Rate and Rhythm: Normal rate.   Pulmonary:      Effort: Pulmonary effort is normal.   Abdominal:      General: Bowel sounds are normal.      Palpations: Abdomen is soft.      Tenderness: There is abdominal tenderness in the left upper quadrant. There is no guarding or rebound.      Comments:   The patient has localized tenderness to the left upper quadrant.  No external signs of trauma.  No swelling.  No ecchymosis.  No tenderness to the distal left anterior ribs.   Musculoskeletal:      Cervical back: Normal range of motion and neck supple.   Skin:     General: Skin is warm and dry.   Neurological:      Mental Status: She is alert and oriented to person, place, and time.               Progress:  CT Abdomen without Contrast:   COMPARISON: None.     FINDINGS:  Lower Chest: Unremarkable.     Liver: Normal.     Spleen: Unremarkable.     Pancreas: Unremarkable.     Gallbladder: No calcified stones.     Biliary: Nondilated.     Adrenal glands: Normal.     Kidneys: Unremarkable without hydronephrosis.     Bowel: No obstruction or acute inflammation.     Lymph nodes: No adenopathy.     Vasculature: Unremarkable.     Peritoneum: Unremarkable without ascites.     Musculoskeletal: No acute or destructive process.       IMPRESSION:  No acute abnormalities identified on CT abdomen without contrast.              Assessment & Plan        1. Injury " of abdomen, initial encounter  - CT-ABDOMEN W/O; Future    2. Acute LUQ pain  - CT-ABDOMEN W/O; Future    3. Contusion of abdominal wall, initial encounter    The patient's presenting symptoms and physical examination are consistent with acute left upper quadrant abdominal pain secondary to an injury of the abdomen.  On physical exam, patient had localized tenderness to the left upper quadrant without guarding or rebound.  The patient's bowel sounds were normal.  The patient had no external signs of trauma.  No swelling or ecchymosis was noted.  The patient had no tenderness overlying the distal left anterior ribs.  The remainder the patient's physical exam today in clinic was normal.  The patient appears in no acute distress.  The patient's vital signs are stable and within normal limits.  She is afebrile today in clinic.  The patient is concerned about possible internal trauma.  Therefore, will obtain a CT of the abdomen without contrast to further evaluate her current symptoms and recent injury.  The patient CT abdomen showed no acute abnormalities.  The patient's spleen was viewed as unremarkable.  Based on patient's presenting symptoms and physical exam findings, I suspect the patient sustained a contusion of the abdominal wall.  Advised patient to monitor for worsening signs or symptoms.  Recommend OTC medications and supportive care for symptomatic management.  Recommend patient follow-up with her PCP as needed.  Discussed tricked ED precautions with patient, and she verbalized understanding.    Differential diagnoses, supportive care, and indications for immediate follow-up discussed with patient.   Instructed to return to clinic or nearest emergency department for any change in condition, further concerns, or worsening of symptoms.    OTC Tylenol or Motrin for fever/discomfort.  Apply ice and or heat to the affected area for symptomatic relief  Monitor worsening signs and or symptoms  Follow-up with  PCP  Return to clinic or go to the ED if symptoms worsen or fail to improve, or if the patient should develop worsening/increasing/persistent pain/tenderness to the injured area, swelling, bruising, increased redness or warmth, abdominal pain, nausea/vomiting, diarrhea, bloody stools, urinary symptoms, hematuria, flank pain, fever/chills, and/or any concerning symptoms.    Discussed plan with the patient, and she agrees to the above.     I personally reviewed prior external notes and test results pertinent to today's visit.  I have independently reviewed and interpreted all diagnostics ordered during this urgent care visit.       Please note that this dictation was created using voice recognition software. I have made every reasonable attempt to correct obvious errors, but I expect that there may be errors of grammar and possibly content that I did not discover before finalizing the note.     This note was electronically signed by Renea Patrick PA-C

## 2022-07-18 ENCOUNTER — HOSPITAL ENCOUNTER (OUTPATIENT)
Facility: MEDICAL CENTER | Age: 37
End: 2022-07-18
Attending: NURSE PRACTITIONER
Payer: COMMERCIAL

## 2022-07-18 ENCOUNTER — OFFICE VISIT (OUTPATIENT)
Dept: URGENT CARE | Facility: CLINIC | Age: 37
End: 2022-07-18
Payer: COMMERCIAL

## 2022-07-18 VITALS
TEMPERATURE: 98.3 F | OXYGEN SATURATION: 98 % | RESPIRATION RATE: 18 BRPM | HEIGHT: 66 IN | WEIGHT: 125 LBS | DIASTOLIC BLOOD PRESSURE: 76 MMHG | BODY MASS INDEX: 20.09 KG/M2 | SYSTOLIC BLOOD PRESSURE: 120 MMHG | HEART RATE: 66 BPM

## 2022-07-18 DIAGNOSIS — R30.0 DYSURIA: ICD-10-CM

## 2022-07-18 DIAGNOSIS — R10.2 VAGINAL PAIN: ICD-10-CM

## 2022-07-18 DIAGNOSIS — N30.00 ACUTE CYSTITIS WITHOUT HEMATURIA: ICD-10-CM

## 2022-07-18 LAB
APPEARANCE UR: CLEAR
BILIRUB UR STRIP-MCNC: NEGATIVE MG/DL
COLOR UR AUTO: YELLOW
GLUCOSE UR STRIP.AUTO-MCNC: NEGATIVE MG/DL
KETONES UR STRIP.AUTO-MCNC: NEGATIVE MG/DL
LEUKOCYTE ESTERASE UR QL STRIP.AUTO: NEGATIVE
NITRITE UR QL STRIP.AUTO: NEGATIVE
PH UR STRIP.AUTO: 6 [PH] (ref 5–8)
PROT UR QL STRIP: NEGATIVE MG/DL
RBC UR QL AUTO: NEGATIVE
SP GR UR STRIP.AUTO: <=1.005
UROBILINOGEN UR STRIP-MCNC: 0.2 MG/DL

## 2022-07-18 PROCEDURE — 99214 OFFICE O/P EST MOD 30 MIN: CPT | Performed by: NURSE PRACTITIONER

## 2022-07-18 PROCEDURE — 87086 URINE CULTURE/COLONY COUNT: CPT

## 2022-07-18 PROCEDURE — 81002 URINALYSIS NONAUTO W/O SCOPE: CPT | Performed by: NURSE PRACTITIONER

## 2022-07-18 RX ORDER — NITROFURANTOIN 25; 75 MG/1; MG/1
100 CAPSULE ORAL EVERY 12 HOURS
Qty: 10 CAPSULE | Refills: 0 | Status: SHIPPED | OUTPATIENT
Start: 2022-07-18 | End: 2022-07-23

## 2022-07-18 NOTE — PROGRESS NOTES
"Subjective:   Emili Awad is a 37 y.o. female who presents for RLQ Pain (When urinating, now having cramping in groin as well)       HPI  Patient presents for evaluation of 24-hour history of right mid to lower quadrant pain during urination.  Patient also reports vaginal pain this morning while trying to get dressed.  Denies urinary frequency hesitancy, or pain.  Is any trauma or injury.  She has not tried thing yet for her symptoms.  A history of recurrent UTIs.    ROS  All other systems are negative except as documented above within HPI.    MEDS:   Current Outpatient Medications:   •  Pseudoeph-Doxylamine-DM-APAP (DAYQUIL/NYQUIL COLD/FLU RELIEF PO), Take  by mouth. (Patient not taking: No sig reported), Disp: , Rfl:   •  acetaminophen (TYLENOL) 325 MG Tab, Take 650 mg by mouth every four hours as needed. (Patient not taking: Reported on 7/18/2022), Disp: , Rfl:   •  azelastine (ASTELIN) 137 MCG/SPRAY nasal spray, Administer 1 Spray into affected nostril(S) 2 times a day. (Patient not taking: No sig reported), Disp: 30 mL, Rfl: 0  •  norethindrone (MICRONOR) 0.35 MG tablet, Take 1 tablet by mouth every day. (Patient not taking: No sig reported), Disp: 90 tablet, Rfl: 3  •  Ascorbic Acid (VITAMIN C PO), Take  by mouth. (Patient not taking: Reported on 7/18/2022), Disp: , Rfl:   •  Cholecalciferol (VITAMIN D PO), Take 1 Tab by mouth every day. (Patient not taking: Reported on 7/18/2022), Disp: , Rfl:   •  Multiple Vitamins-Minerals (MULTIVITAMIN PO), Take  by mouth. (Patient not taking: Reported on 7/18/2022), Disp: , Rfl:   •  Probiotic Product (PROBIOTIC DAILY PO), Take  by mouth. (Patient not taking: Reported on 7/18/2022), Disp: , Rfl:   ALLERGIES: No Known Allergies    Patient's PMH, SocHx, SurgHx, FamHx, Drug allergies and medications were reviewed.     Objective:   /76   Pulse 66   Temp 36.8 °C (98.3 °F) (Temporal)   Resp 18   Ht 1.676 m (5' 6\")   Wt 56.7 kg (125 lb)   SpO2 98%   BMI 20.18 kg/m² "     Physical Exam  Vitals and nursing note reviewed.   Constitutional:       General: She is awake.      Appearance: Normal appearance. She is well-developed.   HENT:      Head: Normocephalic and atraumatic.      Right Ear: External ear normal.      Left Ear: External ear normal.      Nose: Nose normal.      Mouth/Throat:      Mouth: Mucous membranes are moist.      Pharynx: Oropharynx is clear.   Eyes:      Extraocular Movements: Extraocular movements intact.      Conjunctiva/sclera: Conjunctivae normal.   Cardiovascular:      Rate and Rhythm: Normal rate and regular rhythm.      Heart sounds: Normal heart sounds.   Pulmonary:      Effort: Pulmonary effort is normal.      Breath sounds: Normal breath sounds.   Abdominal:      General: Bowel sounds are normal.      Palpations: Abdomen is soft.      Tenderness: There is no right CVA tenderness or left CVA tenderness.   Musculoskeletal:         General: Normal range of motion.      Cervical back: Normal range of motion and neck supple.   Skin:     General: Skin is warm and dry.   Neurological:      General: No focal deficit present.      Mental Status: She is alert and oriented to person, place, and time.   Psychiatric:         Mood and Affect: Mood normal.         Behavior: Behavior normal. Behavior is cooperative.         Thought Content: Thought content normal.         Judgment: Judgment normal.         Assessment/Plan:   Assessment    1. Acute cystitis without hematuria  - nitrofurantoin (MACROBID) 100 MG Cap; Take 1 Capsule by mouth every 12 hours for 5 days.  Dispense: 10 Capsule; Refill: 0    2. Vaginal pain  - POCT Urinalysis  - Urine Culture; Future    3. Dysuria  - POCT Urinalysis  - Urine Culture; Future  - nitrofurantoin (MACROBID) 100 MG Cap; Take 1 Capsule by mouth every 12 hours for 5 days.  Dispense: 10 Capsule; Refill: 0      Vital signs stable at today's acute urgent care visit.  Reviewed test results that were completed in the clinic.  Send urine  for culture.  Begin antibiotics as listed.  Recommend begin cranberry tablets as well as pushing fluids. Discussed management options (risks, benefits, and alternatives to treatment).     Advised the patient to follow-up with the primary care provider for recheck, reevaluation, and/or consideration of further management if necessary. Return to urgent care with any worsening symptoms or if there is no improvement in their current condition. Red flags discussed and indications to immediately call 911 or present to the Emergency Department.  All questions were encouraged and answered to the patient's satisfaction and understanding, and they agree to the plan of care.     I personally reviewed prior external notes and test results pertinent to today's visit.  I have independently reviewed and interpreted all diagnostics ordered during this urgent care acute visit. Time spent evaluating this patient was a minimum of 30 minutes and includes preparing for visit, counseling/education, exam and evaluation, obtaining history, independent interpretation and ordering lab/test/procedures.      Please note that this dictation was created using voice recognition software. I have made a reasonable attempt to correct obvious errors, but I expect that there are errors of grammar and possibly content that I did not discover before finalizing the note.

## 2022-07-20 DIAGNOSIS — N30.90 CYSTITIS: ICD-10-CM

## 2022-07-20 LAB
BACTERIA UR CULT: NORMAL
SIGNIFICANT IND 70042: NORMAL
SITE SITE: NORMAL
SOURCE SOURCE: NORMAL

## 2022-07-20 RX ORDER — SULFAMETHOXAZOLE AND TRIMETHOPRIM 800; 160 MG/1; MG/1
1 TABLET ORAL 2 TIMES DAILY
Qty: 6 TABLET | Refills: 0 | Status: SHIPPED | OUTPATIENT
Start: 2022-07-20 | End: 2022-07-23

## 2022-09-13 ENCOUNTER — OFFICE VISIT (OUTPATIENT)
Dept: URGENT CARE | Facility: CLINIC | Age: 37
End: 2022-09-13
Payer: COMMERCIAL

## 2022-09-13 VITALS
TEMPERATURE: 98 F | BODY MASS INDEX: 20.09 KG/M2 | HEART RATE: 80 BPM | HEIGHT: 66 IN | RESPIRATION RATE: 18 BRPM | WEIGHT: 125 LBS | SYSTOLIC BLOOD PRESSURE: 108 MMHG | OXYGEN SATURATION: 98 % | DIASTOLIC BLOOD PRESSURE: 74 MMHG

## 2022-09-13 DIAGNOSIS — R19.7 DIARRHEA, UNSPECIFIED TYPE: ICD-10-CM

## 2022-09-13 PROCEDURE — 99213 OFFICE O/P EST LOW 20 MIN: CPT

## 2022-09-14 RX ORDER — AZITHROMYCIN 500 MG/1
500 TABLET, FILM COATED ORAL DAILY
Qty: 3 TABLET | Refills: 0 | Status: SHIPPED | OUTPATIENT
Start: 2022-09-15 | End: 2022-09-18

## 2022-09-14 ASSESSMENT — ENCOUNTER SYMPTOMS
SHORTNESS OF BREATH: 0
WEIGHT LOSS: 0
SPUTUM PRODUCTION: 0
COUGH: 0
FEVER: 0
HEARTBURN: 0
ABDOMINAL PAIN: 0
WHEEZING: 0
VOMITING: 0
DIARRHEA: 1
MYALGIAS: 0
NAUSEA: 0
CHILLS: 0
HEMOPTYSIS: 0
DIAPHORESIS: 0
BLOOD IN STOOL: 0

## 2022-09-14 NOTE — PROGRESS NOTES
Subjective:   Emili Awad is a 37 y.o. female who presents for Diarrhea (X3 days )      HPI: This is a 37-year-old female who presents today for diarrhea.  This is a new problem.  Patient reports diarrhea started today.  She reports having 3 watery stools over the last 24 hours.  She has used over-the-counter Pepto-Bismol for this.  She denies blood in stool, denies mucus in stool.  Patient reports recent travel to [place] and returning on 9\10.  Diarrhea began 3 days after return from [place].  She denies changes in her diet otherwise.  No recent antibiotic use.  She denies abdominal pain, fevers, chills, body aches.      Review of Systems   Constitutional:  Negative for chills, diaphoresis, fever, malaise/fatigue and weight loss.   Respiratory:  Negative for cough, hemoptysis, sputum production, shortness of breath and wheezing.    Gastrointestinal:  Positive for diarrhea. Negative for abdominal pain, blood in stool, heartburn, melena, nausea and vomiting.   Genitourinary:  Negative for dysuria.   Musculoskeletal:  Negative for myalgias.   All other systems reviewed and are negative.    Medications:    Current Outpatient Medications on File Prior to Visit   Medication Sig Dispense Refill    Pseudoeph-Doxylamine-DM-APAP (DAYQUIL/NYQUIL COLD/FLU RELIEF PO) Take  by mouth. (Patient not taking: No sig reported)      acetaminophen (TYLENOL) 325 MG Tab Take 650 mg by mouth every four hours as needed. (Patient not taking: No sig reported)      azelastine (ASTELIN) 137 MCG/SPRAY nasal spray Administer 1 Spray into affected nostril(S) 2 times a day. (Patient not taking: No sig reported) 30 mL 0    norethindrone (MICRONOR) 0.35 MG tablet Take 1 tablet by mouth every day. (Patient not taking: No sig reported) 90 tablet 3    Ascorbic Acid (VITAMIN C PO) Take  by mouth. (Patient not taking: No sig reported)      Cholecalciferol (VITAMIN D PO) Take 1 Tab by mouth every day. (Patient not taking: No sig reported)      Multiple  "Vitamins-Minerals (MULTIVITAMIN PO) Take  by mouth. (Patient not taking: No sig reported)      Probiotic Product (PROBIOTIC DAILY PO) Take  by mouth. (Patient not taking: No sig reported)       No current facility-administered medications on file prior to visit.        Allergies:   Patient has no known allergies.    Problem List:   Patient Active Problem List   Diagnosis    BMI less than 19,adult    Irregular menses    Secondary amenorrhea    Premature ventricular complex    Vitamin D deficiency    Macrocytosis    Leukopenia        Surgical History:  Past Surgical History:   Procedure Laterality Date    DENTAL SURGERY      wisdom teeth extraction    DILATION AND CURETTAGE      D & C, election  in        Past Social Hx:   Social History     Tobacco Use    Smoking status: Never    Smokeless tobacco: Never   Vaping Use    Vaping Use: Never used   Substance Use Topics    Alcohol use: Yes     Alcohol/week: 0.0 oz     Comment: occasional, 3x week    Drug use: No          Problem list, medications, and allergies reviewed by myself today in Epic.     Objective:     /74   Pulse 80   Temp 36.7 °C (98 °F) (Temporal)   Resp 18   Ht 1.676 m (5' 6\")   Wt 56.7 kg (125 lb)   SpO2 98%   BMI 20.18 kg/m²     Physical Exam  Vitals and nursing note reviewed.   Constitutional:       General: She is awake. She is not in acute distress.     Appearance: Normal appearance. She is well-developed and normal weight. She is not ill-appearing, toxic-appearing or diaphoretic.   HENT:      Head: Normocephalic and atraumatic.      Mouth/Throat:      Mouth: Mucous membranes are moist.      Pharynx: Oropharynx is clear. No oropharyngeal exudate or posterior oropharyngeal erythema.   Cardiovascular:      Rate and Rhythm: Normal rate and regular rhythm.      Pulses: Normal pulses.      Heart sounds: Normal heart sounds. No murmur heard.    No friction rub. No gallop.   Pulmonary:      Effort: Pulmonary effort is normal. No " respiratory distress.      Breath sounds: Normal breath sounds. No stridor. No wheezing, rhonchi or rales.   Chest:      Chest wall: No tenderness.   Abdominal:      General: Abdomen is flat. There is no distension.      Palpations: Abdomen is soft.      Tenderness: There is no abdominal tenderness. There is no right CVA tenderness, left CVA tenderness or guarding.      Comments: Hyperactive BS in all 4 quads   Musculoskeletal:      Cervical back: Neck supple. No tenderness.   Lymphadenopathy:      Cervical: No cervical adenopathy.   Skin:     General: Skin is warm and dry.      Capillary Refill: Capillary refill takes less than 2 seconds.   Neurological:      General: No focal deficit present.      Mental Status: She is alert and oriented to person, place, and time. Mental status is at baseline.      Cranial Nerves: No cranial nerve deficit.      Motor: No weakness.      Gait: Gait normal.   Psychiatric:         Mood and Affect: Mood normal.         Behavior: Behavior normal. Behavior is cooperative.         Thought Content: Thought content normal.         Judgment: Judgment normal.       Assessment/Plan:     Diagnosis and associated orders:   1. Diarrhea, unspecified type               Comments/MDM:   Pt is clinically stable at today's acute urgent care visit.  No acute distress noted. Appropriate for outpatient management at this time.     Acute problem.  Patient is not ill or toxic appearing in clinic today.  No sign of dehydration on exam, vital signs stable.  Patient's symptoms likely secondary to traveler's diarrhea.  I have discussed this with patient.  I have advised patient to stay well-hydrated, discussed using OTC Imodium to help slow down the number of stools.  Discussed bland brat diet.  Patient will be prescribed contingent antibiotic for her symptoms that worsen or fail to improve in the next 48 hours.  Patient verbalizes good understanding of this.  Patient is to return for any new or worsening  signs or symptoms.           Discussed DDx, management options (risks,benefits, and alternatives to planned treatment), natural progression and supportive care.  Expressed understanding and the treatment plan was agreed upon. Questions were encouraged and answered   Return to urgent care prn if new or worsening sx or if there is no improvement in condition prn.    Educated in Red flags and indications to immediately call 911 or present to the Emergency Department.   Advised the patient to follow-up with the primary care physician for recheck, reevaluation, and consideration of further management.    I personally reviewed prior external notes and test results pertinent to today's visit.  I have independently reviewed and interpreted all diagnostics ordered during this urgent care acute visit.         Please note that this dictation was created using voice recognition software. I have made a reasonable attempt to correct obvious errors, but I expect that there are errors of grammar and possibly content that I did not discover before finalizing the note.    This note was electronically signed by AMBROSE Zavala

## 2022-09-27 ENCOUNTER — IMMUNIZATION (OUTPATIENT)
Dept: OCCUPATIONAL MEDICINE | Facility: CLINIC | Age: 37
End: 2022-09-27

## 2022-09-27 DIAGNOSIS — Z23 NEED FOR VACCINATION: Primary | ICD-10-CM

## 2022-09-30 PROCEDURE — 90662 IIV NO PRSV INCREASED AG IM: CPT | Performed by: PREVENTIVE MEDICINE

## 2022-12-19 ENCOUNTER — HOSPITAL ENCOUNTER (OUTPATIENT)
Facility: MEDICAL CENTER | Age: 37
End: 2022-12-19
Attending: STUDENT IN AN ORGANIZED HEALTH CARE EDUCATION/TRAINING PROGRAM
Payer: COMMERCIAL

## 2022-12-19 ENCOUNTER — OFFICE VISIT (OUTPATIENT)
Dept: MEDICAL GROUP | Facility: MEDICAL CENTER | Age: 37
End: 2022-12-19
Payer: COMMERCIAL

## 2022-12-19 ENCOUNTER — PATIENT MESSAGE (OUTPATIENT)
Dept: MEDICAL GROUP | Facility: MEDICAL CENTER | Age: 37
End: 2022-12-19

## 2022-12-19 VITALS
TEMPERATURE: 97.9 F | SYSTOLIC BLOOD PRESSURE: 88 MMHG | OXYGEN SATURATION: 97 % | HEART RATE: 73 BPM | HEIGHT: 66 IN | BODY MASS INDEX: 20.57 KG/M2 | DIASTOLIC BLOOD PRESSURE: 54 MMHG | WEIGHT: 128 LBS | RESPIRATION RATE: 16 BRPM

## 2022-12-19 DIAGNOSIS — Z13.29 SCREENING FOR THYROID DISORDER: ICD-10-CM

## 2022-12-19 DIAGNOSIS — R82.90 ABNORMAL URINE: ICD-10-CM

## 2022-12-19 DIAGNOSIS — I95.9 HYPOTENSION, UNSPECIFIED HYPOTENSION TYPE: ICD-10-CM

## 2022-12-19 DIAGNOSIS — Z13.220 SCREENING FOR LIPID DISORDERS: ICD-10-CM

## 2022-12-19 DIAGNOSIS — E55.9 VITAMIN D DEFICIENCY: ICD-10-CM

## 2022-12-19 DIAGNOSIS — Z11.4 SCREENING FOR HIV (HUMAN IMMUNODEFICIENCY VIRUS): ICD-10-CM

## 2022-12-19 DIAGNOSIS — N30.00 ACUTE CYSTITIS WITHOUT HEMATURIA: ICD-10-CM

## 2022-12-19 DIAGNOSIS — Z11.59 NEED FOR HEPATITIS C SCREENING TEST: ICD-10-CM

## 2022-12-19 DIAGNOSIS — D72.819 LEUKOPENIA, UNSPECIFIED TYPE: ICD-10-CM

## 2022-12-19 LAB
APPEARANCE UR: ABNORMAL
APPEARANCE UR: NORMAL
BACTERIA #/AREA URNS HPF: ABNORMAL /HPF
BILIRUB UR QL STRIP.AUTO: NEGATIVE
BILIRUB UR STRIP-MCNC: NEGATIVE MG/DL
COLOR UR AUTO: YELLOW
COLOR UR: YELLOW
EPI CELLS #/AREA URNS HPF: ABNORMAL /HPF
GLUCOSE UR STRIP.AUTO-MCNC: NEGATIVE MG/DL
GLUCOSE UR STRIP.AUTO-MCNC: NEGATIVE MG/DL
KETONES UR STRIP.AUTO-MCNC: NEGATIVE MG/DL
KETONES UR STRIP.AUTO-MCNC: NEGATIVE MG/DL
LEUKOCYTE ESTERASE UR QL STRIP.AUTO: ABNORMAL
LEUKOCYTE ESTERASE UR QL STRIP.AUTO: NORMAL
MICRO URNS: ABNORMAL
NITRITE UR QL STRIP.AUTO: NEGATIVE
NITRITE UR QL STRIP.AUTO: NEGATIVE
PH UR STRIP.AUTO: 5.5 [PH] (ref 5–8)
PH UR STRIP.AUTO: 5.5 [PH] (ref 5–8)
PROT UR QL STRIP: NEGATIVE MG/DL
PROT UR QL STRIP: NEGATIVE MG/DL
RBC # URNS HPF: ABNORMAL /HPF
RBC UR QL AUTO: NEGATIVE
RBC UR QL AUTO: NEGATIVE
SP GR UR STRIP.AUTO: 1.01
SP GR UR STRIP.AUTO: 1.01
UROBILINOGEN UR STRIP-MCNC: NORMAL MG/DL
UROBILINOGEN UR STRIP.AUTO-MCNC: 0.2 MG/DL
WBC #/AREA URNS HPF: ABNORMAL /HPF
YEAST BUDDING URNS QL: PRESENT /HPF

## 2022-12-19 PROCEDURE — 81002 URINALYSIS NONAUTO W/O SCOPE: CPT | Performed by: STUDENT IN AN ORGANIZED HEALTH CARE EDUCATION/TRAINING PROGRAM

## 2022-12-19 PROCEDURE — 87086 URINE CULTURE/COLONY COUNT: CPT

## 2022-12-19 PROCEDURE — 99214 OFFICE O/P EST MOD 30 MIN: CPT | Performed by: STUDENT IN AN ORGANIZED HEALTH CARE EDUCATION/TRAINING PROGRAM

## 2022-12-19 PROCEDURE — 81001 URINALYSIS AUTO W/SCOPE: CPT

## 2022-12-19 RX ORDER — SULFAMETHOXAZOLE AND TRIMETHOPRIM 800; 160 MG/1; MG/1
1 TABLET ORAL EVERY 12 HOURS
Qty: 6 TABLET | Refills: 0 | Status: SHIPPED | OUTPATIENT
Start: 2022-12-19 | End: 2022-12-22

## 2022-12-19 NOTE — PROGRESS NOTES
"Chief Complaint   Patient presents with    Painful Urination     X10 days pain right lower quadrant when urinating       HPI:  Symptom onset: 7 days ago   Current symptoms: Pain in the right pelvis with urination only, no pain at rest, no fevers, no flank pain, she has had ovarian cyst in the past  Since onset symptoms are: Unchanged  Associated symptoms: Negative for fever, flank pain, nausea and vomiting, vaginal discharge, pelvic pain.    ROS:  Denies fever, chills, vomiting or abdominal pain.     OBJECTIVE:  BP (!) 88/54 (BP Location: Left arm, Patient Position: Sitting, BP Cuff Size: Adult)   Pulse 73   Temp 36.6 °C (97.9 °F) (Temporal)   Resp 16   Ht 1.676 m (5' 6\")   Wt 58.1 kg (128 lb)   SpO2 97%   Gen: Alert, NAD.  Chest: Lungs clear to auscultation, CV RRR.  Abdomen: Soft, tender in suprapubic region. No CVAT. Normal bowel sounds.        ASSESSMENT/PLAN:     1. Dysuria    2. Acute cystitis without hematuria      1. Acute cystitis without hematuria  Point-of-care urine with trace leukocyte esterase, due to symptoms we will start Bactrim, urine sent for culture  - POCT Urinalysis  - sulfamethoxazole-trimethoprim (BACTRIM DS) 800-160 MG tablet; Take 1 Tablet by mouth every 12 hours for 3 days.  Dispense: 6 Tablet; Refill: 0  - URINE CULTURE(NEW); Future    2. Abnormal urine  Abnormal appearance of urine, very cloudy with lots of sediment, urinalysis to check for casts  - URINALYSIS; Future    3. Hypotension, unspecified hypotension type  Patient states this is chronic, although her blood pressure is lower than at previous visits, she states is because she has been exercising more, she is completely asymptomatic at this time, advised to stay hydrated      "

## 2022-12-22 ENCOUNTER — HOSPITAL ENCOUNTER (OUTPATIENT)
Dept: LAB | Facility: MEDICAL CENTER | Age: 37
End: 2022-12-22
Attending: FAMILY MEDICINE
Payer: COMMERCIAL

## 2022-12-22 DIAGNOSIS — Z11.4 SCREENING FOR HIV (HUMAN IMMUNODEFICIENCY VIRUS): ICD-10-CM

## 2022-12-22 DIAGNOSIS — Z13.29 SCREENING FOR THYROID DISORDER: ICD-10-CM

## 2022-12-22 DIAGNOSIS — Z13.220 SCREENING FOR LIPID DISORDERS: ICD-10-CM

## 2022-12-22 DIAGNOSIS — Z11.59 NEED FOR HEPATITIS C SCREENING TEST: ICD-10-CM

## 2022-12-22 DIAGNOSIS — D72.819 LEUKOPENIA, UNSPECIFIED TYPE: ICD-10-CM

## 2022-12-22 LAB
BACTERIA UR CULT: NORMAL
BASOPHILS # BLD AUTO: 0.6 % (ref 0–1.8)
BASOPHILS # BLD: 0.04 K/UL (ref 0–0.12)
EOSINOPHIL # BLD AUTO: 0.04 K/UL (ref 0–0.51)
EOSINOPHIL NFR BLD: 0.6 % (ref 0–6.9)
ERYTHROCYTE [DISTWIDTH] IN BLOOD BY AUTOMATED COUNT: 45.6 FL (ref 35.9–50)
HCT VFR BLD AUTO: 40.1 % (ref 37–47)
HGB BLD-MCNC: 14 G/DL (ref 12–16)
IMM GRANULOCYTES # BLD AUTO: 0.01 K/UL (ref 0–0.11)
IMM GRANULOCYTES NFR BLD AUTO: 0.2 % (ref 0–0.9)
LYMPHOCYTES # BLD AUTO: 1.82 K/UL (ref 1–4.8)
LYMPHOCYTES NFR BLD: 28.6 % (ref 22–41)
MCH RBC QN AUTO: 34 PG (ref 27–33)
MCHC RBC AUTO-ENTMCNC: 34.9 G/DL (ref 33.6–35)
MCV RBC AUTO: 97.3 FL (ref 81.4–97.8)
MONOCYTES # BLD AUTO: 0.47 K/UL (ref 0–0.85)
MONOCYTES NFR BLD AUTO: 7.4 % (ref 0–13.4)
NEUTROPHILS # BLD AUTO: 3.98 K/UL (ref 2–7.15)
NEUTROPHILS NFR BLD: 62.6 % (ref 44–72)
NRBC # BLD AUTO: 0 K/UL
NRBC BLD-RTO: 0 /100 WBC
PLATELET # BLD AUTO: 92 K/UL (ref 164–446)
PMV BLD AUTO: 11.7 FL (ref 9–12.9)
RBC # BLD AUTO: 4.12 M/UL (ref 4.2–5.4)
SIGNIFICANT IND 70042: NORMAL
SITE SITE: NORMAL
SOURCE SOURCE: NORMAL
WBC # BLD AUTO: 6.4 K/UL (ref 4.8–10.8)

## 2022-12-22 PROCEDURE — 85025 COMPLETE CBC W/AUTO DIFF WBC: CPT

## 2022-12-22 PROCEDURE — 80053 COMPREHEN METABOLIC PANEL: CPT

## 2022-12-22 PROCEDURE — 36415 COLL VENOUS BLD VENIPUNCTURE: CPT

## 2022-12-22 PROCEDURE — 80061 LIPID PANEL: CPT

## 2022-12-22 PROCEDURE — 87389 HIV-1 AG W/HIV-1&-2 AB AG IA: CPT

## 2022-12-22 PROCEDURE — 84443 ASSAY THYROID STIM HORMONE: CPT

## 2022-12-22 PROCEDURE — 86803 HEPATITIS C AB TEST: CPT

## 2022-12-23 LAB
ALBUMIN SERPL BCP-MCNC: 4 G/DL (ref 3.2–4.9)
ALBUMIN/GLOB SERPL: 1.5 G/DL
ALP SERPL-CCNC: 83 U/L (ref 30–99)
ALT SERPL-CCNC: 16 U/L (ref 2–50)
ANION GAP SERPL CALC-SCNC: 8 MMOL/L (ref 7–16)
AST SERPL-CCNC: 21 U/L (ref 12–45)
BILIRUB SERPL-MCNC: 0.5 MG/DL (ref 0.1–1.5)
BUN SERPL-MCNC: 11 MG/DL (ref 8–22)
CALCIUM ALBUM COR SERPL-MCNC: 8.9 MG/DL (ref 8.5–10.5)
CALCIUM SERPL-MCNC: 8.9 MG/DL (ref 8.5–10.5)
CHLORIDE SERPL-SCNC: 105 MMOL/L (ref 96–112)
CHOLEST SERPL-MCNC: 162 MG/DL (ref 100–199)
CO2 SERPL-SCNC: 23 MMOL/L (ref 20–33)
CREAT SERPL-MCNC: 0.74 MG/DL (ref 0.5–1.4)
GFR SERPLBLD CREATININE-BSD FMLA CKD-EPI: 108 ML/MIN/1.73 M 2
GLOBULIN SER CALC-MCNC: 2.7 G/DL (ref 1.9–3.5)
GLUCOSE SERPL-MCNC: 81 MG/DL (ref 65–99)
HCV AB SER QL: NORMAL
HDLC SERPL-MCNC: 76 MG/DL
HIV 1+2 AB+HIV1 P24 AG SERPL QL IA: NORMAL
LDLC SERPL CALC-MCNC: 74 MG/DL
POTASSIUM SERPL-SCNC: 4.1 MMOL/L (ref 3.6–5.5)
PROT SERPL-MCNC: 6.7 G/DL (ref 6–8.2)
SODIUM SERPL-SCNC: 136 MMOL/L (ref 135–145)
TRIGL SERPL-MCNC: 60 MG/DL (ref 0–149)
TSH SERPL DL<=0.005 MIU/L-ACNC: 1.17 UIU/ML (ref 0.38–5.33)

## 2022-12-26 SDOH — ECONOMIC STABILITY: HOUSING INSECURITY: IN THE LAST 12 MONTHS, HOW MANY PLACES HAVE YOU LIVED?: 1

## 2022-12-26 SDOH — ECONOMIC STABILITY: FOOD INSECURITY: WITHIN THE PAST 12 MONTHS, YOU WORRIED THAT YOUR FOOD WOULD RUN OUT BEFORE YOU GOT MONEY TO BUY MORE.: NEVER TRUE

## 2022-12-26 SDOH — ECONOMIC STABILITY: FOOD INSECURITY: WITHIN THE PAST 12 MONTHS, THE FOOD YOU BOUGHT JUST DIDN'T LAST AND YOU DIDN'T HAVE MONEY TO GET MORE.: NEVER TRUE

## 2022-12-26 SDOH — HEALTH STABILITY: PHYSICAL HEALTH: ON AVERAGE, HOW MANY DAYS PER WEEK DO YOU ENGAGE IN MODERATE TO STRENUOUS EXERCISE (LIKE A BRISK WALK)?: 7 DAYS

## 2022-12-26 SDOH — ECONOMIC STABILITY: HOUSING INSECURITY
IN THE LAST 12 MONTHS, WAS THERE A TIME WHEN YOU DID NOT HAVE A STEADY PLACE TO SLEEP OR SLEPT IN A SHELTER (INCLUDING NOW)?: NO

## 2022-12-26 SDOH — ECONOMIC STABILITY: TRANSPORTATION INSECURITY
IN THE PAST 12 MONTHS, HAS THE LACK OF TRANSPORTATION KEPT YOU FROM MEDICAL APPOINTMENTS OR FROM GETTING MEDICATIONS?: NO

## 2022-12-26 SDOH — ECONOMIC STABILITY: INCOME INSECURITY: IN THE LAST 12 MONTHS, WAS THERE A TIME WHEN YOU WERE NOT ABLE TO PAY THE MORTGAGE OR RENT ON TIME?: NO

## 2022-12-26 SDOH — HEALTH STABILITY: MENTAL HEALTH
STRESS IS WHEN SOMEONE FEELS TENSE, NERVOUS, ANXIOUS, OR CAN'T SLEEP AT NIGHT BECAUSE THEIR MIND IS TROUBLED. HOW STRESSED ARE YOU?: ONLY A LITTLE

## 2022-12-26 SDOH — ECONOMIC STABILITY: INCOME INSECURITY: HOW HARD IS IT FOR YOU TO PAY FOR THE VERY BASICS LIKE FOOD, HOUSING, MEDICAL CARE, AND HEATING?: NOT VERY HARD

## 2022-12-26 SDOH — ECONOMIC STABILITY: TRANSPORTATION INSECURITY
IN THE PAST 12 MONTHS, HAS LACK OF RELIABLE TRANSPORTATION KEPT YOU FROM MEDICAL APPOINTMENTS, MEETINGS, WORK OR FROM GETTING THINGS NEEDED FOR DAILY LIVING?: NO

## 2022-12-26 SDOH — HEALTH STABILITY: PHYSICAL HEALTH: ON AVERAGE, HOW MANY MINUTES DO YOU ENGAGE IN EXERCISE AT THIS LEVEL?: 60 MIN

## 2022-12-26 SDOH — ECONOMIC STABILITY: TRANSPORTATION INSECURITY
IN THE PAST 12 MONTHS, HAS LACK OF TRANSPORTATION KEPT YOU FROM MEETINGS, WORK, OR FROM GETTING THINGS NEEDED FOR DAILY LIVING?: NO

## 2022-12-26 ASSESSMENT — SOCIAL DETERMINANTS OF HEALTH (SDOH)
HOW OFTEN DO YOU ATTENT MEETINGS OF THE CLUB OR ORGANIZATION YOU BELONG TO?: NEVER
DO YOU BELONG TO ANY CLUBS OR ORGANIZATIONS SUCH AS CHURCH GROUPS UNIONS, FRATERNAL OR ATHLETIC GROUPS, OR SCHOOL GROUPS?: NO
IN A TYPICAL WEEK, HOW MANY TIMES DO YOU TALK ON THE PHONE WITH FAMILY, FRIENDS, OR NEIGHBORS?: THREE TIMES A WEEK
HOW OFTEN DO YOU ATTENT MEETINGS OF THE CLUB OR ORGANIZATION YOU BELONG TO?: NEVER
HOW OFTEN DO YOU GET TOGETHER WITH FRIENDS OR RELATIVES?: TWICE A WEEK
IN A TYPICAL WEEK, HOW MANY TIMES DO YOU TALK ON THE PHONE WITH FAMILY, FRIENDS, OR NEIGHBORS?: THREE TIMES A WEEK
HOW OFTEN DO YOU GET TOGETHER WITH FRIENDS OR RELATIVES?: TWICE A WEEK
HOW OFTEN DO YOU ATTEND CHURCH OR RELIGIOUS SERVICES?: NEVER
WITHIN THE PAST 12 MONTHS, YOU WORRIED THAT YOUR FOOD WOULD RUN OUT BEFORE YOU GOT THE MONEY TO BUY MORE: NEVER TRUE
HOW OFTEN DO YOU ATTEND CHURCH OR RELIGIOUS SERVICES?: NEVER
HOW OFTEN DO YOU HAVE SIX OR MORE DRINKS ON ONE OCCASION: NEVER
HOW HARD IS IT FOR YOU TO PAY FOR THE VERY BASICS LIKE FOOD, HOUSING, MEDICAL CARE, AND HEATING?: NOT VERY HARD
HOW OFTEN DO YOU HAVE A DRINK CONTAINING ALCOHOL: 2-3 TIMES A WEEK
HOW MANY DRINKS CONTAINING ALCOHOL DO YOU HAVE ON A TYPICAL DAY WHEN YOU ARE DRINKING: 1 OR 2
DO YOU BELONG TO ANY CLUBS OR ORGANIZATIONS SUCH AS CHURCH GROUPS UNIONS, FRATERNAL OR ATHLETIC GROUPS, OR SCHOOL GROUPS?: NO

## 2022-12-26 ASSESSMENT — LIFESTYLE VARIABLES
HOW MANY STANDARD DRINKS CONTAINING ALCOHOL DO YOU HAVE ON A TYPICAL DAY: 1 OR 2
AUDIT-C TOTAL SCORE: 3
HOW OFTEN DO YOU HAVE A DRINK CONTAINING ALCOHOL: 2-3 TIMES A WEEK
SKIP TO QUESTIONS 9-10: 1
HOW OFTEN DO YOU HAVE SIX OR MORE DRINKS ON ONE OCCASION: NEVER

## 2022-12-27 ENCOUNTER — OFFICE VISIT (OUTPATIENT)
Dept: MEDICAL GROUP | Facility: MEDICAL CENTER | Age: 37
End: 2022-12-27
Payer: COMMERCIAL

## 2022-12-27 VITALS
HEART RATE: 50 BPM | WEIGHT: 130.07 LBS | HEIGHT: 66 IN | TEMPERATURE: 97.9 F | OXYGEN SATURATION: 99 % | BODY MASS INDEX: 20.9 KG/M2 | RESPIRATION RATE: 16 BRPM | DIASTOLIC BLOOD PRESSURE: 60 MMHG | SYSTOLIC BLOOD PRESSURE: 120 MMHG

## 2022-12-27 DIAGNOSIS — D69.6 THROMBOCYTOPENIA (HCC): ICD-10-CM

## 2022-12-27 DIAGNOSIS — Z00.00 ANNUAL PHYSICAL EXAM: ICD-10-CM

## 2022-12-27 PROBLEM — D72.819 LEUKOPENIA: Status: RESOLVED | Noted: 2020-07-24 | Resolved: 2022-12-27

## 2022-12-27 PROCEDURE — 99395 PREV VISIT EST AGE 18-39: CPT | Performed by: FAMILY MEDICINE

## 2022-12-27 ASSESSMENT — ENCOUNTER SYMPTOMS
NAUSEA: 0
PALPITATIONS: 0
DIARRHEA: 0
SINUS PAIN: 0
HEMOPTYSIS: 0
NERVOUS/ANXIOUS: 0
COUGH: 0
WEIGHT LOSS: 0
CONSTIPATION: 0
WHEEZING: 0
FEVER: 0
SENSORY CHANGE: 0
VOMITING: 0
EYE DISCHARGE: 0
MYALGIAS: 0
ABDOMINAL PAIN: 0
SHORTNESS OF BREATH: 0
FOCAL WEAKNESS: 0
DIZZINESS: 0
DEPRESSION: 0
EYE PAIN: 0
HEADACHES: 0
CHILLS: 0
SPUTUM PRODUCTION: 0
EYE REDNESS: 0

## 2022-12-27 ASSESSMENT — LIFESTYLE VARIABLES: SUBSTANCE_ABUSE: 0

## 2022-12-27 ASSESSMENT — PATIENT HEALTH QUESTIONNAIRE - PHQ9: CLINICAL INTERPRETATION OF PHQ2 SCORE: 0

## 2022-12-27 ASSESSMENT — FIBROSIS 4 INDEX: FIB4 SCORE: 2

## 2022-12-27 NOTE — PROGRESS NOTES
37 y.o. female for annual routine physical exam.    Chief Complaint.  Chief Complaint   Patient presents with    Annual Exam    Lab Results       History of present illness:    Last Pap Smear: 2 year  History of abnormal pap smears. None   Gyn Surgery: None  Current Contraceptive Method:  IUD  Sexual history: currently sexually active    Last menstrual period: 12/27/2022.      Health Maintenance  Advanced directive: n/a  Osteoporosis Screen/ DEXA: n/a  PT/vit D for falls prevention: n/a   Cholesterol Screening:   Lab Results   Component Value Date/Time    CHOLSTRLTOT 162 12/22/2022 1045    TRIGLYCERIDE 60 12/22/2022 1045    HDL 76 12/22/2022 1045    LDL 74 12/22/2022 1045      Diabetes Screening: Recent labs showed fasting glucose normal  AAA Screening (65 to 74 year male): n/a   Aspirin Use: N/A  Diet: Eats healthy diet  Exercise: Is physically active  Substance Abuse: None  Safe in relationship.Yes  Below anticipatory guidance discussed with patient  Seat belts, bike helmet, gun safety discussed.  Sun protection use discussed    Cancer screening  Colorectal Cancer Screening: Discussed colon cancer screening start at age 45  Lung Cancer Screening: She does not smoke  Cervical Cancer Screening: Due for Pap smear, she is going to follow-up with gynecology for Pap smear  Breast Cancer Screening: Discussed breast cancer screening started age 40        Infectious disease screening/Immunizations  --STI Screening: None  --Practices safe sex.  --HIV Screening: Recent HIV test negative  --Hepatitis C Screening: Hep C test came back negative also  --Immunizations: Up-to-date for vaccines, got COVID booster      Review of systems:    Review of Systems   Constitutional:  Negative for chills, fever, malaise/fatigue and weight loss.   HENT:  Negative for ear discharge, ear pain, hearing loss and sinus pain.    Eyes:  Negative for pain, discharge and redness.   Respiratory:  Negative for cough, hemoptysis, sputum production,  "shortness of breath and wheezing.    Cardiovascular:  Negative for chest pain, palpitations and leg swelling.   Gastrointestinal:  Negative for abdominal pain, constipation, diarrhea, nausea and vomiting.   Genitourinary:  Negative for dysuria, frequency and urgency.   Musculoskeletal:  Negative for joint pain and myalgias.   Skin:  Negative for itching and rash.   Neurological:  Negative for dizziness, sensory change, focal weakness and headaches.   Psychiatric/Behavioral:  Negative for depression, substance abuse and suicidal ideas. The patient is not nervous/anxious.       Medications and Allergies:     Current Outpatient Medications   Medication Sig Dispense Refill    Ascorbic Acid (VITAMIN C PO) Take  by mouth.      Multiple Vitamins-Minerals (MULTIVITAMIN PO) Take  by mouth.      Probiotic Product (PROBIOTIC DAILY PO) Take  by mouth.       No current facility-administered medications for this visit.        Allergies   Allergen Reactions    Macrobid [Nitrofurantoin] Vomiting       Vitals:    /60 (BP Location: Left arm, Patient Position: Sitting, BP Cuff Size: Adult)   Pulse (!) 50   Temp 36.6 °C (97.9 °F)   Resp 16   Ht 1.676 m (5' 6\")   Wt 59 kg (130 lb 1.1 oz)   SpO2 99%  Body mass index is 20.99 kg/m².    Physical Exam:   Physical Exam  Constitutional:       General: She is not in acute distress.     Appearance: Normal appearance. She is not ill-appearing or diaphoretic.   HENT:      Head: Normocephalic and atraumatic.      Right Ear: Tympanic membrane, ear canal and external ear normal.      Left Ear: Tympanic membrane, ear canal and external ear normal.      Nose: Nose normal.      Mouth/Throat:      Mouth: Mucous membranes are moist.      Pharynx: No oropharyngeal exudate or posterior oropharyngeal erythema.   Eyes:      General:         Right eye: No discharge.         Left eye: No discharge.      Conjunctiva/sclera: Conjunctivae normal.   Neck:      Thyroid: No thyromegaly.   Cardiovascular: "      Rate and Rhythm: Normal rate and regular rhythm.      Heart sounds: Normal heart sounds. No murmur heard.  Pulmonary:      Effort: Pulmonary effort is normal. No respiratory distress.      Breath sounds: Normal breath sounds. No wheezing or rales.   Abdominal:      General: Bowel sounds are normal. There is no distension.      Palpations: Abdomen is soft.      Tenderness: There is no abdominal tenderness. There is no guarding.   Musculoskeletal:         General: No deformity. Normal range of motion.      Cervical back: Neck supple.   Skin:     General: Skin is warm.      Findings: No rash.   Neurological:      General: No focal deficit present.      Mental Status: She is alert. Mental status is at baseline.      Gait: Gait is intact.   Psychiatric:         Mood and Affect: Mood and affect normal.         Judgment: Judgment normal.            Assessment/Plan:    Emili was seen today for annual exam and lab results.    Diagnoses and all orders for this visit:    Annual physical exam  Up-to-date for cancer screenings, she is going to follow-up with gynecology for Pap smear.  Up-to-date for vaccines.  Anticipatory guidance discussed above in note    Thrombocytopenia (HCC)  Recent platelet count came back low at 92, recheck CBC in 3 months.  Recommended to stop taking biotin as that may cause this.  -     CBC WITH DIFFERENTIAL; Future         Follow up in 1 year for annual physical, sooner if labs are abnormal

## 2023-01-24 ENCOUNTER — HOSPITAL ENCOUNTER (OUTPATIENT)
Facility: MEDICAL CENTER | Age: 38
End: 2023-01-24
Attending: OBSTETRICS & GYNECOLOGY
Payer: COMMERCIAL

## 2023-01-24 ENCOUNTER — GYNECOLOGY VISIT (OUTPATIENT)
Dept: OBGYN | Facility: CLINIC | Age: 38
End: 2023-01-24
Payer: COMMERCIAL

## 2023-01-24 VITALS — WEIGHT: 130 LBS | BODY MASS INDEX: 20.98 KG/M2 | SYSTOLIC BLOOD PRESSURE: 111 MMHG | DIASTOLIC BLOOD PRESSURE: 63 MMHG

## 2023-01-24 DIAGNOSIS — Z01.419 ENCOUNTER FOR ANNUAL ROUTINE GYNECOLOGICAL EXAMINATION: ICD-10-CM

## 2023-01-24 PROCEDURE — 87491 CHLMYD TRACH DNA AMP PROBE: CPT

## 2023-01-24 PROCEDURE — 87591 N.GONORRHOEAE DNA AMP PROB: CPT

## 2023-01-24 PROCEDURE — 87624 HPV HI-RISK TYP POOLED RSLT: CPT

## 2023-01-24 PROCEDURE — 88175 CYTOPATH C/V AUTO FLUID REDO: CPT

## 2023-01-24 PROCEDURE — 99395 PREV VISIT EST AGE 18-39: CPT | Performed by: OBSTETRICS & GYNECOLOGY

## 2023-01-24 ASSESSMENT — FIBROSIS 4 INDEX: FIB4 SCORE: 2

## 2023-01-24 NOTE — PROGRESS NOTES
Patient here for annual exam  Last pap done/result: 05/22/2018, negative  LMP: 01/23/2023  BCM: IUD  Last mammogram, if applicable: n/a  Phone number: [phone] (home)   Pharmacy verified

## 2023-01-24 NOTE — PROGRESS NOTES
ANNUAL GYNECOLOGY VISIT    Chief Complaint  Gynecologic Exam (Annual/)      Subjective  Emili Awad is a 37 y.o. female who presents today for Annual Exam.  She has no concerns that she would like to discuss other than contemplating pregnancy at some point in the future. We discussed maternal age and decreasing fertility. We discussed taking a prenatal vitamin prior to IUD removal and discussed the process of having the IUD removed. She has no family history of cancers and she is pleased with her birth control method at this time.     Preventive Care   Immunization History   Administered Date(s) Administered    Dtap Vaccine 1987, 1987, 1987, 04/27/1992    Hepatitis A Vaccine, Adult 10/15/2013    Hepatitis B Vaccine (Adol/Adult) 06/13/2011, 07/13/2011, 12/13/2011    Hib Vaccine (Prp-d) - HISTORICAL DATA 03/25/1991    Influenza (IM) Preservative Free - HISTORICAL DATA 03/29/2011, 10/18/2012, 11/10/2016    Influenza Seasonal Injectable - Historical Data 02/14/2012    Influenza Vaccine Adult HD 09/30/2022    Influenza Vaccine H1N1 - HISTORICAL DATA 11/10/2009    Influenza Vaccine Quad Inj (Pf) 10/04/2013, 10/04/2014, 11/10/2016, 11/21/2017, 10/05/2018, 11/02/2019, 09/29/2020    Influenza Vaccine Quad Inj (Preserved) 10/20/2015    MMR Vaccine 01/04/1989, 04/27/1992    MODERNA SARS-COV-2 VACCINE (12+) 12/30/2020, 01/29/2021    OPV TRIVALENT - HISTORICAL DATA (GIVEN PRIOR TO MAY 2016) 1987, 1987, 04/27/1992    TD Vaccine 09/03/2003    Tdap Vaccine 06/13/2011, 12/13/2016    Tuberculin Skin Test 11/01/2013     Last Mammogram: N/A    Gynecology History and ROS  Current Sexual Activity: Yes  History of sexually transmitted diseases?  no  Abnormal discharge? No  Current Contraception:  Mirena IUD (about 2-3 years old)    Menstrual History  Patient's last menstrual period was 01/23/2023 (exact date).  Periods are regular  q 28 days  Clots or heavy flow: No  Dysmenorrhea: No  Intermenstrual  bleeding/spotting: No  Significant pain with periods:No  Bothersome PMS symptoms: No  Significant Pelvic Pain: No    Pap History  Last pap smear: 2018, normal  History of moderate or severe dysplasia: No    Cancer Risk Assessement:  Family history of:   - Breast cancer: no   - Ovarian cancer: no   - Uterine cancer: no   - Colon cancer: no    Obstetric History  OB History    Para Term  AB Living   1 0 0 0 1 0   SAB IAB Ectopic Molar Multiple Live Births   0 1 0   0        # Outcome Date GA Lbr Sammy/2nd Weight Sex Delivery Anes PTL Lv   1 IAB                Past Medical History  Past Medical History:   Diagnosis Date    Cardiac arrhythmia     Chickenpox     Eczema     Episodic lightheadedness 2017    H. pylori infection     treated    Hypokalemia     IUD (intrauterine device) in place     Needle stick injury     took medications, pt was tested negative       Past Surgical History  Past Surgical History:   Procedure Laterality Date    DENTAL SURGERY      wisdom teeth extraction    DILATION AND CURETTAGE      D & C, election  in        Social History  Social History     Socioeconomic History    Marital status:      Spouse name: Not on file    Number of children: Not on file    Years of education: Not on file    Highest education level: Associate degree: occupational, technical, or vocational program   Occupational History    Occupation: [email or web address]   Tobacco Use    Smoking status: Never    Smokeless tobacco: Never   Vaping Use    Vaping Use: Never used   Substance and Sexual Activity    Alcohol use: Yes     Alcohol/week: 0.0 oz     Comment: occasional, 3x week    Drug use: No    Sexual activity: Yes     Partners: Male     Birth control/protection: I.U.D.     Comment: [place] in , works at [employer]   Other Topics Concern    Not on file   Social History Narrative    , no children.      Social Determinants of Health     Financial Resource Strain: Low Risk      Difficulty of Paying Living Expenses: Not very hard   Food Insecurity: No Food Insecurity    Worried About Running Out of Food in the Last Year: Never true    Ran Out of Food in the Last Year: Never true   Transportation Needs: No Transportation Needs    Lack of Transportation (Medical): No    Lack of Transportation (Non-Medical): No   Physical Activity: Sufficiently Active    Days of Exercise per Week: 7 days    Minutes of Exercise per Session: 60 min   Stress: No Stress Concern Present    Feeling of Stress : Only a little   Social Connections: Moderately Isolated    Frequency of Communication with Friends and Family: Three times a week    Frequency of Social Gatherings with Friends and Family: Twice a week    Attends Sikhism Services: Never    Active Member of Clubs or Organizations: No    Attends Club or Organization Meetings: Never    Marital Status:    Intimate Partner Violence: Not on file   Housing Stability: Low Risk     Unable to Pay for Housing in the Last Year: No    Number of Places Lived in the Last Year: 1    Unstable Housing in the Last Year: No       Family History  Family History   Problem Relation Age of Onset    Diabetes Mother     Hypertension Father     Cancer Paternal Uncle         lymphoma       Home Medications  Current Outpatient Medications on File Prior to Visit   Medication Sig Dispense Refill    Ascorbic Acid (VITAMIN C PO) Take  by mouth.      Multiple Vitamins-Minerals (MULTIVITAMIN PO) Take  by mouth.      Probiotic Product (PROBIOTIC DAILY PO) Take  by mouth.       No current facility-administered medications on file prior to visit.       Allergies/Reactions  Allergies   Allergen Reactions    Macrobid [Nitrofurantoin] Vomiting       ROS  Positive ROS: no  Gen: no fevers or chills, no significant weight loss or gain, excessive fatigue  Respiratory:  no cough or dyspnea  Cardiac:  no chest pain, no palpitations, no syncope  Breast: no breast discharge, pain, lump or skin  changes  GI:  no heartburn, no abdominal pain, no nausea or vomiting  Urinary: no dysuria, urgency, frequency, incontinence   Psych: no depression or anxiety  Neuro: no migraines with aura, fainting spells, numbness or tingling  Extremities: no joint pain, persistently swollen ankles, recurrent leg cramps      Physical Examination:  Vital Signs:   Vitals:    01/24/23 1035   BP: 111/63   BP Location: Right arm   Patient Position: Sitting   Weight: 130 lb     Body mass index is 20.98 kg/m².    Constitutional: The patient is well developed and well nourished.  Psychiatric: Patient is oriented to time place and person.   Skin: No rash observed.  Neck: Appears symmetric. Thyroid normal size  Respiratory: normal effort  Breast: Inspection reveals no asymetry or nipple discharge, no skin thickening, dimpling or erythema.  Palpation demonstrates no masses.  Abdomen: Soft, non-tender.  Pelvic Exam:     Vulva: external female genitalia are normal in appearance. No lesions    Urethra - no lesions, no erythema    Vagina: moist, pink, normal ruggae    Cervix: pink, smooth, no lesions, no CMT    Uterus - non-tender, normal size, shape, contour, mobile, anteverted, tilts to the patients left    Ovaries: non-tender, no appreciable masses  Pap Smear performed: Yes  Extremeties: Legs are symmetric and without tenderness. There is no edema present.    Labs/Imaging:  HDL (mg/dL)   Date Value   12/22/2022 76     LDL (mg/dL)   Date Value   12/22/2022 74     No components found for: A1C1  WBC (K/uL)   Date Value   12/22/2022 6.4     Lab Results   Component Value Date/Time    CO2 23 12/22/2022 1045    BUN 11 12/22/2022 1045     [unfilled]      Assessment & Plan  Emili Awad is a 37 y.o. female who presents today for Annual Gyn Exam.     1. Encounter for annual routine gynecological examination  - No current health complaints  - Using Mirena IUD as contraception, contemplating pregnancy, this was discussed   - Pap smear collected  -  Discussed breast awareness recommendations     - THINPREP PAP W/HPV AND CTNG; Future    Return: Annually or PRN    Frank Hernandez M.D.

## 2023-01-25 DIAGNOSIS — Z01.419 ENCOUNTER FOR ANNUAL ROUTINE GYNECOLOGICAL EXAMINATION: ICD-10-CM

## 2023-01-25 LAB
C TRACH DNA GENITAL QL NAA+PROBE: NEGATIVE
CYTOLOGY REG CYTOL: ABNORMAL
HPV HR 12 DNA CVX QL NAA+PROBE: POSITIVE
HPV16 DNA SPEC QL NAA+PROBE: NEGATIVE
HPV18 DNA SPEC QL NAA+PROBE: NEGATIVE
N GONORRHOEA DNA GENITAL QL NAA+PROBE: NEGATIVE
SPECIMEN SOURCE: ABNORMAL
SPECIMEN SOURCE: ABNORMAL

## 2023-01-27 ENCOUNTER — TELEPHONE (OUTPATIENT)
Dept: OBGYN | Facility: CLINIC | Age: 38
End: 2023-01-27

## 2023-01-27 NOTE — TELEPHONE ENCOUNTER
Pt called stating would like to know if a Colpo hurts and if it would be okay to take some Tylenol or Ibuprofen before appt.    Informed pt there is cramping during procedure most likely tolerable and that she make take the medication above before appt.     Pt understood and confirmed appt time and date

## 2023-02-07 ENCOUNTER — TELEPHONE (OUTPATIENT)
Dept: OBGYN | Facility: CLINIC | Age: 38
End: 2023-02-07
Payer: COMMERCIAL

## 2023-02-07 NOTE — TELEPHONE ENCOUNTER
"Pt called in and states that she has an upcoming appointment for a Colposcopy with Dr. Hernanedz on 2/10/2023. Pt states she would like to know if possibly, she can have lidocaine on the day of her procedure. Pt states that she has had a lot of pain when she has had her IUD replacement back on 4/2021. Advise Pt I will forward her request to the provider's MA. Pt has no further questions.      2/7/2023 @ 1340:    Called Pt but N/A, LVMTCB. If Pt calls back, please inform below message from the provider. Thank you.      \"Hi, I haven't done a paracervical block for a colpo here yet. It may hurt worse to do it than to take a few biopsies if it's even possible. It's probably best if she premedicates with motrin\".  "

## 2023-02-10 ENCOUNTER — GYNECOLOGY VISIT (OUTPATIENT)
Dept: OBGYN | Facility: CLINIC | Age: 38
End: 2023-02-10
Payer: COMMERCIAL

## 2023-02-10 ENCOUNTER — HOSPITAL ENCOUNTER (OUTPATIENT)
Facility: MEDICAL CENTER | Age: 38
End: 2023-02-10
Attending: OBSTETRICS & GYNECOLOGY
Payer: COMMERCIAL

## 2023-02-10 VITALS — SYSTOLIC BLOOD PRESSURE: 94 MMHG | BODY MASS INDEX: 20.01 KG/M2 | WEIGHT: 124 LBS | DIASTOLIC BLOOD PRESSURE: 71 MMHG

## 2023-02-10 DIAGNOSIS — R87.612 LGSIL ON PAP SMEAR OF CERVIX: ICD-10-CM

## 2023-02-10 LAB — PATHOLOGY CONSULT NOTE: NORMAL

## 2023-02-10 PROCEDURE — 57454 BX/CURETT OF CERVIX W/SCOPE: CPT | Performed by: OBSTETRICS & GYNECOLOGY

## 2023-02-10 PROCEDURE — 88305 TISSUE EXAM BY PATHOLOGIST: CPT | Mod: 59

## 2023-02-10 ASSESSMENT — ENCOUNTER SYMPTOMS
PSYCHIATRIC NEGATIVE: 1
CONSTITUTIONAL NEGATIVE: 1
MUSCULOSKELETAL NEGATIVE: 1
NEUROLOGICAL NEGATIVE: 1
RESPIRATORY NEGATIVE: 1
EYES NEGATIVE: 1
GASTROINTESTINAL NEGATIVE: 1
CARDIOVASCULAR NEGATIVE: 1

## 2023-02-10 ASSESSMENT — FIBROSIS 4 INDEX: FIB4 SCORE: 2

## 2023-02-10 NOTE — PROGRESS NOTES
"GYN PROBLEM VISIT    CC:  Colposcopy    HPI: Patient is a 37 y.o.  who presents for colposcopy. Her pap smear showed LSIL with positive \"other\" HPV     ROS:   Review of Systems   Constitutional: Negative.    HENT: Negative.     Eyes: Negative.    Respiratory: Negative.     Cardiovascular: Negative.    Gastrointestinal: Negative.    Genitourinary: Negative.    Musculoskeletal: Negative.    Skin: Negative.    Neurological: Negative.    Endo/Heme/Allergies: Negative.    Psychiatric/Behavioral: Negative.       PFSH:  I personally reviewed the past medical and surgical histories.     Social History     Tobacco Use    Smoking status: Never    Smokeless tobacco: Never   Vaping Use    Vaping Use: Never used   Substance Use Topics    Alcohol use: Yes     Alcohol/week: 0.0 oz     Comment: occasional, 3x week    Drug use: No       Social History     Substance and Sexual Activity   Sexual Activity Yes    Partners: Male    Birth control/protection: I.U.D.    Comment: [place] in , works at [employer]        ALLERGIES / REACTIONS:  Allergies   Allergen Reactions    Macrobid [Nitrofurantoin] Vomiting                           PHYSICAL EXAMINATION:  Vital Signs: There were no vitals filed for this visit.  There is no height or weight on file to calculate BMI.    Physical Exam  Vitals reviewed. Exam conducted with a chaperone present.   Constitutional:       Appearance: Normal appearance.   HENT:      Head: Normocephalic.      Nose: Nose normal.   Eyes:      Extraocular Movements: Extraocular movements intact.      Pupils: Pupils are equal, round, and reactive to light.   Cardiovascular:      Rate and Rhythm: Normal rate.   Pulmonary:      Effort: Pulmonary effort is normal.   Abdominal:      General: Abdomen is flat.      Palpations: Abdomen is soft.   Genitourinary:     General: Normal vulva.      Exam position: Lithotomy position.      Vagina: Normal.      Cervix: Lesion present.            Comments: See procedure " note  Musculoskeletal:         General: Normal range of motion.      Cervical back: Normal range of motion.   Skin:     General: Skin is warm and dry.   Neurological:      General: No focal deficit present.      Mental Status: She is alert and oriented to person, place, and time.   Psychiatric:         Mood and Affect: Mood normal.         Behavior: Behavior normal.        ASSESSMENT AND PLAN:  37 y.o.  who presents for a colposcopy     1. LGSIL on Pap smear of cervix  - Consent for all Surgical, Special Diagnostic or Therapeutic Procedures  - POCT Pregnancy  - She has not yet received the guardisil vaccine and is interested in getting the series, she will schedule a nurse visit for next week as the clinic is currently out       Frank Hernandez M.D.  Obstetrics & Gynecology   [phone]  1:26 PM

## 2023-02-10 NOTE — PROCEDURES
"COLPOSCOPY PROCEDURE NOTE:  Emili Awad is a 37 y.o. female presents today for colposcopy evaluation for history of LSIL, \"other\" HPV positive result.     BP 94/71 (BP Location: Left arm, Patient Position: Sitting)   Wt 124 lb   LMP 01/23/2023 (Exact Date)   BMI 20.01 kg/m²     PROCEDURE:  Indication: LSIL  Adequacy: colposcopy adequate.  SCJ Visibility: Completely visible   Procedure: SCJ visualized - lesion at 11 o'clock, endocervical curettage performed, cervical biopsies taken at 7 o'clock, specimen labelled and sent to pathology, and hemostasis achieved with Monsel's solution  Findings: See physical exam in note for picture, acetowhite uptake at 11 o'clock, there was a 0.5cm thin translucent lesion noted and small area of acetowhite uptake at 7 o 'clock     ASSESSMENT:   HPV related changes    PLAN:   specimens labelled and sent to Pathology, will base further treatment on Pathology findings, and post biopsy instructions given to patient    Frank Hernandez M.D.    "

## 2023-02-16 ENCOUNTER — NON-PROVIDER VISIT (OUTPATIENT)
Dept: OBGYN | Facility: CLINIC | Age: 38
End: 2023-02-16
Payer: COMMERCIAL

## 2023-02-16 ENCOUNTER — APPOINTMENT (OUTPATIENT)
Dept: OBGYN | Facility: CLINIC | Age: 38
End: 2023-02-16
Payer: COMMERCIAL

## 2023-02-16 DIAGNOSIS — Z01.419 ENCOUNTER FOR ANNUAL ROUTINE GYNECOLOGICAL EXAMINATION: ICD-10-CM

## 2023-02-16 PROCEDURE — 90651 9VHPV VACCINE 2/3 DOSE IM: CPT | Performed by: OBSTETRICS & GYNECOLOGY

## 2023-02-16 PROCEDURE — 96372 THER/PROPH/DIAG INJ SC/IM: CPT | Performed by: OBSTETRICS & GYNECOLOGY

## 2023-02-16 PROCEDURE — 90471 IMMUNIZATION ADMIN: CPT | Performed by: OBSTETRICS & GYNECOLOGY

## 2023-02-17 NOTE — PROGRESS NOTES
Pt was here for a Garasil injection  NDC:3635-1211-60  Lot #:[id number]  Exp:2024  Dose: . 5 ml   Site:Left Arm   Pt Educated, Name and  verified  Pt tolerated  Verified by: TM  Administered by: ER

## 2023-02-18 ENCOUNTER — OFFICE VISIT (OUTPATIENT)
Dept: URGENT CARE | Facility: CLINIC | Age: 38
End: 2023-02-18
Payer: COMMERCIAL

## 2023-02-18 VITALS
HEART RATE: 60 BPM | OXYGEN SATURATION: 98 % | SYSTOLIC BLOOD PRESSURE: 102 MMHG | TEMPERATURE: 98.4 F | BODY MASS INDEX: 20.09 KG/M2 | HEIGHT: 66 IN | DIASTOLIC BLOOD PRESSURE: 60 MMHG | WEIGHT: 125 LBS | RESPIRATION RATE: 14 BRPM

## 2023-02-18 DIAGNOSIS — Z20.822 EXPOSURE TO COVID-19 VIRUS: ICD-10-CM

## 2023-02-18 DIAGNOSIS — R19.7 DIARRHEA, UNSPECIFIED TYPE: ICD-10-CM

## 2023-02-18 DIAGNOSIS — J02.9 SORE THROAT: ICD-10-CM

## 2023-02-18 LAB
FLUAV RNA SPEC QL NAA+PROBE: NEGATIVE
FLUBV RNA SPEC QL NAA+PROBE: NEGATIVE
INT CON NEG: NORMAL
INT CON POS: NORMAL
RSV RNA SPEC QL NAA+PROBE: NEGATIVE
S PYO AG THROAT QL: NEGATIVE
SARS-COV-2 RNA RESP QL NAA+PROBE: NOT DETECTED

## 2023-02-18 PROCEDURE — 99214 OFFICE O/P EST MOD 30 MIN: CPT | Mod: CS | Performed by: NURSE PRACTITIONER

## 2023-02-18 PROCEDURE — 87651 STREP A DNA AMP PROBE: CPT | Performed by: NURSE PRACTITIONER

## 2023-02-18 PROCEDURE — 0241U POCT CEPHEID COV-2, FLU A/B, RSV - PCR: CPT | Performed by: NURSE PRACTITIONER

## 2023-02-18 ASSESSMENT — FIBROSIS 4 INDEX: FIB4 SCORE: 2

## 2023-02-18 NOTE — PROGRESS NOTES
Emili Awad is a 37 y.o. female who presents for Sore Throat (X 1 days, sore throat, diarrhea, some nasal congestion, tired.)      HPI  This is a new problem. Emili Awad is a 37 y.o. patient who presents to urgent care with c/o: Sore throat for 1 day.  Exposure to person with COVID infection.  She started having diarrhea in the past 24 hours.  She has had fatigue and a slight headache.  She is concerned about the possibility of having contracted strep throat or COVID infection.  Treatments tried: NyQuil last night helped her sleep a little bit  Denies fever, chills, body aches, rash, chest pain, leg swelling, sinus pain or pressure, ear pain  No other aggravating or alleviating factors.       ROS See HPI    Allergies:       Allergies   Allergen Reactions    Macrobid [Nitrofurantoin] Vomiting       PMSFS Hx:  Past Medical History:   Diagnosis Date    Cardiac arrhythmia     Chickenpox     Eczema     Episodic lightheadedness 2017    H. pylori infection     treated    Hypokalemia     IUD (intrauterine device) in place     Needle stick injury     took medications, pt was tested negative     Past Surgical History:   Procedure Laterality Date    DENTAL SURGERY      wisdom teeth extraction    DILATION AND CURETTAGE      D & C, election  in      Family History   Problem Relation Age of Onset    Diabetes Mother     Hypertension Father     Cancer Paternal Uncle         lymphoma     Social History     Tobacco Use    Smoking status: Never    Smokeless tobacco: Never   Substance Use Topics    Alcohol use: Yes     Alcohol/week: 0.0 oz     Comment: occasional, 3x week       Problems:   Patient Active Problem List   Diagnosis    BMI less than 19,adult    Irregular menses    Secondary amenorrhea    Premature ventricular complex    Vitamin D deficiency    Macrocytosis       Medications:   Current Outpatient Medications on File Prior to Visit   Medication Sig Dispense Refill    Ascorbic Acid (VITAMIN C  "PO) Take  by mouth.      Multiple Vitamins-Minerals (MULTIVITAMIN PO) Take  by mouth.      Probiotic Product (PROBIOTIC DAILY PO) Take  by mouth.       No current facility-administered medications on file prior to visit.          Objective:     /60   Pulse 60   Temp 36.9 °C (98.4 °F) (Temporal)   Resp 14   Ht 1.676 m (5' 6\")   Wt 56.7 kg (125 lb)   LMP 01/23/2023 (Exact Date)   SpO2 98%   BMI 20.18 kg/m²     Physical Exam  Vitals and nursing note reviewed.   Constitutional:       General: She is not in acute distress.     Appearance: Normal appearance. She is well-developed. She is not toxic-appearing.   HENT:      Head: Normocephalic.      Right Ear: Hearing, tympanic membrane, ear canal and external ear normal.      Left Ear: Hearing, tympanic membrane, ear canal and external ear normal.      Nose: Nose normal.      Right Sinus: No frontal sinus tenderness.      Left Sinus: No frontal sinus tenderness.      Mouth/Throat:      Pharynx: Uvula midline. Posterior oropharyngeal erythema present. No oropharyngeal exudate.      Tonsils: No tonsillar abscesses.   Eyes:      General: Lids are normal.      Pupils: Pupils are equal, round, and reactive to light.   Neck:      Trachea: Trachea and phonation normal.   Cardiovascular:      Rate and Rhythm: Normal rate and regular rhythm.      Pulses: Normal pulses.      Heart sounds: Normal heart sounds.   Pulmonary:      Effort: Pulmonary effort is normal. No respiratory distress.      Breath sounds: Normal breath sounds.   Musculoskeletal:         General: Normal range of motion.      Cervical back: Full passive range of motion without pain, normal range of motion and neck supple.   Lymphadenopathy:      Head:      Right side of head: No tonsillar adenopathy.      Left side of head: No tonsillar adenopathy.      Cervical: No cervical adenopathy.      Upper Body:      Right upper body: No supraclavicular adenopathy.      Left upper body: No supraclavicular " adenopathy.   Skin:     General: Skin is warm and dry.      Capillary Refill: Capillary refill takes less than 2 seconds.      Findings: No rash.   Neurological:      Mental Status: She is alert and oriented to person, place, and time.      Deep Tendon Reflexes: Reflexes are normal and symmetric.   Psychiatric:         Mood and Affect: Mood normal.         Speech: Speech normal.         Behavior: Behavior normal.         Thought Content: Thought content normal.     Results for orders placed or performed in visit on 02/18/23   POCT Rapid Strep A   Result Value Ref Range    Rapid Strep Screen Negative     Internal Control Positive Valid     Internal Control Negative Valid    POCT CEPHEID COV-2, FLU A/B, RSV - PCR   Result Value Ref Range    SARS-CoV-2 by PCR Not Detected Not Detected, Invalid    Influenza virus A RNA Negative Negative, Invalid    Influenza virus B, PCR Negative Negative, Invalid    RSV, PCR Negative Negative, Invalid         Assessment /Associated Orders:      1. Exposure to COVID-19 virus  POCT CEPHEID COV-2, FLU A/B, RSV - PCR      2. Sore throat  POCT Rapid Strep A    POCT CEPHEID COV-2, FLU A/B, RSV - PCR      3. Diarrhea, unspecified type  POCT CEPHEID COV-2, FLU A/B, RSV - PCR          Medical Decision Making:    Pt is clinically stable at today's acute urgent care visit.  No acute distress noted. Appropriate for outpatient care at this time.   Acute problem today .   Negative strep   Keep well hydrated  Discussed that this illness appears to be viral in nature. I did not see any evidence of a bacterial process.   OTC medications can be used for symptom relief. Follow manufacturers guidelines for dosing and instructions.  These OTC medications will not make you better any faster but can help reduce your discomfort.     Salt water gargles BID and prn. Suggested 1/4 to 1/2 teaspoon (1.5 to 3.0 g) of salt per one cup (8 ounces or 250 mL) of warm water.   OTC throat analgesic spray or lozenge of  choice prn throat pain. Dosage and directions per     Discussed Dx, management options (risks,benefits, and alternatives to planned treatment), natural progression and supportive care.  Expressed understanding and the treatment plan was agreed upon.   Questions were encouraged and answered   Return to urgent care prn if new or worsening sx or if there is no improvement in condition prn.    Educated in Red flags and indications to immediately call 911 or present to the Emergency Department.       Time I spent evaluating Emili Awad in urgent care today was 33  minutes. This time includes preparing for visit, reviewing any pertinent notes or test results, counseling/education, exam, obtaining HPI, interpretation of lab tests, medication management and documentation as indicated above.Time does not include separately billable procedures noted .       Please note that this dictation was created using voice recognition software. I have worked with consultants from the vendor as well as technical experts from [facility] to optimize the interface. I have made every reasonable attempt to correct obvious errors, but I expect that there are errors of grammar and possibly content that I did not discover before finalizing the note.  This note was electronically signed by provider

## 2023-03-22 ENCOUNTER — HOSPITAL ENCOUNTER (OUTPATIENT)
Dept: LAB | Facility: MEDICAL CENTER | Age: 38
End: 2023-03-22
Attending: FAMILY MEDICINE
Payer: COMMERCIAL

## 2023-03-22 DIAGNOSIS — D69.6 THROMBOCYTOPENIA (HCC): ICD-10-CM

## 2023-03-22 LAB
BASOPHILS # BLD AUTO: 0.9 % (ref 0–1.8)
BASOPHILS # BLD: 0.05 K/UL (ref 0–0.12)
EOSINOPHIL # BLD AUTO: 0.03 K/UL (ref 0–0.51)
EOSINOPHIL NFR BLD: 0.5 % (ref 0–6.9)
ERYTHROCYTE [DISTWIDTH] IN BLOOD BY AUTOMATED COUNT: 44.7 FL (ref 35.9–50)
HCT VFR BLD AUTO: 39.9 % (ref 37–47)
HGB BLD-MCNC: 13.9 G/DL (ref 12–16)
IMM GRANULOCYTES # BLD AUTO: 0.01 K/UL (ref 0–0.11)
IMM GRANULOCYTES NFR BLD AUTO: 0.2 % (ref 0–0.9)
LYMPHOCYTES # BLD AUTO: 1.97 K/UL (ref 1–4.8)
LYMPHOCYTES NFR BLD: 34.9 % (ref 22–41)
MCH RBC QN AUTO: 34.2 PG (ref 27–33)
MCHC RBC AUTO-ENTMCNC: 34.8 G/DL (ref 33.6–35)
MCV RBC AUTO: 98 FL (ref 81.4–97.8)
MONOCYTES # BLD AUTO: 0.46 K/UL (ref 0–0.85)
MONOCYTES NFR BLD AUTO: 8.1 % (ref 0–13.4)
NEUTROPHILS # BLD AUTO: 3.13 K/UL (ref 2–7.15)
NEUTROPHILS NFR BLD: 55.4 % (ref 44–72)
NRBC # BLD AUTO: 0 K/UL
NRBC BLD-RTO: 0 /100 WBC
PLATELET # BLD AUTO: 145 K/UL (ref 164–446)
PMV BLD AUTO: 11.7 FL (ref 9–12.9)
RBC # BLD AUTO: 4.07 M/UL (ref 4.2–5.4)
WBC # BLD AUTO: 5.7 K/UL (ref 4.8–10.8)

## 2023-03-22 PROCEDURE — 36415 COLL VENOUS BLD VENIPUNCTURE: CPT

## 2023-03-22 PROCEDURE — 85025 COMPLETE CBC W/AUTO DIFF WBC: CPT

## 2023-05-03 ENCOUNTER — NON-PROVIDER VISIT (OUTPATIENT)
Dept: OBGYN | Facility: CLINIC | Age: 38
End: 2023-05-03
Payer: COMMERCIAL

## 2023-05-03 DIAGNOSIS — R87.612 LGSIL ON PAP SMEAR OF CERVIX: ICD-10-CM

## 2023-05-03 PROCEDURE — 90471 IMMUNIZATION ADMIN: CPT | Performed by: OBSTETRICS & GYNECOLOGY

## 2023-05-03 PROCEDURE — 90651 9VHPV VACCINE 2/3 DOSE IM: CPT | Performed by: OBSTETRICS & GYNECOLOGY

## 2023-05-03 NOTE — NON-PROVIDER
NDC: 4801-8531-98  LOT#: [id number]  Expiration Date: 2024    Dose: 0.5 ml  Site: Right Arm    Patient educated on use and side effects of medication. Name and  verified prior to injection. Pt tolerated? yes     Verified by Antonietta HARDY    Administered by Rogelio Daily Ass't at 10:52 AM.    Patient Provided Medication: no

## 2023-08-18 ENCOUNTER — OFFICE VISIT (OUTPATIENT)
Dept: URGENT CARE | Facility: CLINIC | Age: 38
End: 2023-08-18
Payer: COMMERCIAL

## 2023-08-18 VITALS
DIASTOLIC BLOOD PRESSURE: 58 MMHG | HEART RATE: 47 BPM | OXYGEN SATURATION: 96 % | WEIGHT: 125 LBS | RESPIRATION RATE: 16 BRPM | SYSTOLIC BLOOD PRESSURE: 100 MMHG | BODY MASS INDEX: 20.09 KG/M2 | HEIGHT: 66 IN | TEMPERATURE: 97.4 F

## 2023-08-18 DIAGNOSIS — R10.31 RIGHT LOWER QUADRANT ABDOMINAL PAIN: ICD-10-CM

## 2023-08-18 LAB
APPEARANCE UR: CLEAR
BILIRUB UR STRIP-MCNC: NORMAL MG/DL
COLOR UR AUTO: YELLOW
GLUCOSE UR STRIP.AUTO-MCNC: NORMAL MG/DL
KETONES UR STRIP.AUTO-MCNC: NORMAL MG/DL
LEUKOCYTE ESTERASE UR QL STRIP.AUTO: NORMAL
NITRITE UR QL STRIP.AUTO: NORMAL
PH UR STRIP.AUTO: 6.5 [PH] (ref 5–8)
POCT INT CON NEG: NEGATIVE
POCT INT CON POS: POSITIVE
POCT URINE PREGNANCY TEST: NEGATIVE
PROT UR QL STRIP: NORMAL MG/DL
RBC UR QL AUTO: NORMAL
SP GR UR STRIP.AUTO: <=1.005
UROBILINOGEN UR STRIP-MCNC: 0.2 MG/DL

## 2023-08-18 PROCEDURE — 81002 URINALYSIS NONAUTO W/O SCOPE: CPT

## 2023-08-18 PROCEDURE — 81025 URINE PREGNANCY TEST: CPT

## 2023-08-18 PROCEDURE — 3078F DIAST BP <80 MM HG: CPT

## 2023-08-18 PROCEDURE — 99214 OFFICE O/P EST MOD 30 MIN: CPT

## 2023-08-18 PROCEDURE — 3074F SYST BP LT 130 MM HG: CPT

## 2023-08-18 ASSESSMENT — ENCOUNTER SYMPTOMS
DIARRHEA: 0
BLOOD IN STOOL: 0
FEVER: 0
CHILLS: 0
FLANK PAIN: 0
ABDOMINAL PAIN: 1
MYALGIAS: 0
CONSTIPATION: 0
VOMITING: 0
SHORTNESS OF BREATH: 0
NAUSEA: 0

## 2023-08-18 ASSESSMENT — FIBROSIS 4 INDEX: FIB4 SCORE: 1.3

## 2023-08-18 NOTE — PROGRESS NOTES
Subjective:     CHIEF COMPLAINT  Chief Complaint   Patient presents with    Other     Right lower stomach pain for 5 days       HPI  Emili Awad is a very pleasant 38 y.o. female who presents with right lower quadrant abdominal pain for the past 5 days.  She reports that the pain initially started on Monday and has been intermittently coming and going.  She notices the pain in her right lower quadrant when she urinates and when she has bowel movements.  She denies any pain with urination, increased frequency, or increased urgency to urinate.  She has not had any changes to her bowel movements or seen any blood in her stool.  She denies any nausea or vomiting.  She has not had any fevers, body aches, or chills.  She reports that she is otherwise feeling well.  Her last menstrual period was 2 weeks ago.  She states that 1.5 years ago she had very similar symptoms and was seen in the urgent care and had a urinalysis performed which was normal, but she was started on antibiotics anyways which helped resolve her symptoms.    REVIEW OF SYSTEMS  Review of Systems   Constitutional:  Negative for chills, fever and malaise/fatigue.   Respiratory:  Negative for shortness of breath.    Cardiovascular:  Negative for chest pain.   Gastrointestinal:  Positive for abdominal pain (RLQ). Negative for blood in stool, constipation, diarrhea, melena, nausea and vomiting.   Genitourinary:  Negative for dysuria, flank pain, frequency, hematuria and urgency.   Musculoskeletal:  Negative for myalgias.       PAST MEDICAL HISTORY  Patient Active Problem List    Diagnosis Date Noted    Macrocytosis 07/24/2020    Vitamin D deficiency 05/23/2019    Premature ventricular complex 09/15/2017    Secondary amenorrhea 01/13/2017    BMI less than 19,adult 12/13/2016    Irregular menses 12/13/2016       SURGICAL HISTORY   has a past surgical history that includes dental surgery and dilation and curettage.    ALLERGIES  Allergies   Allergen  "Reactions    Macrobid [Nitrofurantoin] Vomiting       CURRENT MEDICATIONS  Home Medications       Reviewed by Krista Kline P.A.-C. (Physician Assistant) on 08/18/23 at 1341  Med List Status: <None>     Medication Last Dose Status   Ascorbic Acid (VITAMIN C PO) Taking Active   Multiple Vitamins-Minerals (MULTIVITAMIN PO) Taking Active   Probiotic Product (PROBIOTIC DAILY PO) Taking Active                    SOCIAL HISTORY  Social History     Tobacco Use    Smoking status: Never    Smokeless tobacco: Never   Vaping Use    Vaping Use: Never used   Substance and Sexual Activity    Alcohol use: Yes     Alcohol/week: 0.0 oz     Comment: occasional, 3x week    Drug use: No    Sexual activity: Yes     Partners: Male     Birth control/protection: I.U.D.     Comment: [employer] in 2015, works at [employer]       FAMILY HISTORY  Family History   Problem Relation Age of Onset    Diabetes Mother     Hypertension Father     Cancer Paternal Uncle         lymphoma          Objective:     VITAL SIGNS: /58   Pulse (!) 47   Temp 36.3 °C (97.4 °F)   Resp 16   Ht 1.676 m (5' 6\")   Wt 56.7 kg (125 lb)   SpO2 96%   BMI 20.18 kg/m²     PHYSICAL EXAM  Physical Exam  Vitals reviewed.   Constitutional:       General: She is not in acute distress.     Appearance: Normal appearance. She is normal weight. She is not ill-appearing or toxic-appearing.   HENT:      Head: Normocephalic and atraumatic.      Mouth/Throat:      Mouth: Mucous membranes are moist.   Eyes:      Conjunctiva/sclera: Conjunctivae normal.      Pupils: Pupils are equal, round, and reactive to light.   Cardiovascular:      Rate and Rhythm: Normal rate and regular rhythm.   Pulmonary:      Effort: Pulmonary effort is normal. No respiratory distress.      Breath sounds: Normal breath sounds. No stridor. No wheezing, rhonchi or rales.   Abdominal:      General: Abdomen is flat. Bowel sounds are normal. There is no distension.      Palpations: Abdomen is " soft. There is no mass.      Tenderness: There is no abdominal tenderness. There is no right CVA tenderness, left CVA tenderness, guarding or rebound.      Hernia: No hernia is present.   Skin:     General: Skin is warm and dry.   Neurological:      General: No focal deficit present.      Mental Status: She is alert and oriented to person, place, and time.   Psychiatric:         Mood and Affect: Mood normal.       Assessment/Plan:     1. Right lower quadrant abdominal pain  - POCT Urinalysis  - POCT Pregnancy  - CT-ABDOMEN-PELVIS WITH & W/O; Future  -Increase hydration  -Tylenol/ibuprofen OTC as needed for discomfort  -Return to clinic if symptoms worsen or fail to resolve      MDM/Comments:  Patient has stable vital signs and is non-toxic appearing. Discussed supportive care with hydration, rest, Tylenol/Ibuprofen as needed.  Urinalysis and pregnancy testing performed in office with negative results.  Patient reports that she had identical symptoms over a year ago which responded well to antibiotics.  She is afebrile and has not had any nausea, vomiting, or diarrhea.  Outpatient abdomen/renal CT ordered that was unable to be scheduled until Sunday at 1 PM.  Patient given the option to be seen in the ER today or to wait for the imaging.  Patient would like to have outpatient imaging scheduled and agreed to be seen at the ER if her symptoms worsen in any way.  Discussed risks of possible ovarian torsion or appendicitis if left untreated.  Patient demonstrated understanding and would still like to have imaging scheduled outpatient.  Patient demonstrated understanding of treatment plan at this time and will RTC if symptoms worsen or fail to resolve.     Differential diagnosis, natural history, supportive care, and indications for immediate follow-up discussed. All questions answered. Patient agrees with the plan of care.    Follow-up as needed if symptoms worsen or fail to improve to PCP, Urgent care or Emergency  Room.    I have personally reviewed prior external notes and test results pertinent to today's visit.  I have independently reviewed and interpreted all diagnostics ordered during this urgent care acute visit.   Discussed management options (risks,benefits, and alternatives to treatment). Pt expresses understanding and the treatment plan was agreed upon. Questions were encouraged and answered to pt's satisfaction.    Please note that this dictation was created using voice recognition software. I have made a reasonable attempt to correct obvious errors, but I expect that there are errors of grammar and possibly content that I did not discover before finalizing the note.

## 2023-08-20 ENCOUNTER — APPOINTMENT (OUTPATIENT)
Dept: RADIOLOGY | Facility: MEDICAL CENTER | Age: 38
End: 2023-08-20
Payer: COMMERCIAL

## 2023-09-28 ENCOUNTER — IMMUNIZATION (OUTPATIENT)
Dept: OCCUPATIONAL MEDICINE | Facility: CLINIC | Age: 38
End: 2023-09-28

## 2023-09-28 DIAGNOSIS — Z23 NEED FOR VACCINATION: Primary | ICD-10-CM

## 2023-09-28 PROCEDURE — 90686 IIV4 VACC NO PRSV 0.5 ML IM: CPT | Performed by: PREVENTIVE MEDICINE

## 2023-10-03 ENCOUNTER — OFFICE VISIT (OUTPATIENT)
Dept: URGENT CARE | Facility: CLINIC | Age: 38
End: 2023-10-03
Payer: COMMERCIAL

## 2023-10-03 VITALS
DIASTOLIC BLOOD PRESSURE: 58 MMHG | SYSTOLIC BLOOD PRESSURE: 92 MMHG | WEIGHT: 126.6 LBS | RESPIRATION RATE: 18 BRPM | TEMPERATURE: 98.4 F | HEART RATE: 97 BPM | HEIGHT: 66 IN | BODY MASS INDEX: 20.34 KG/M2 | OXYGEN SATURATION: 95 %

## 2023-10-03 DIAGNOSIS — U07.1 COVID-19: ICD-10-CM

## 2023-10-03 DIAGNOSIS — R11.0 NAUSEA: ICD-10-CM

## 2023-10-03 DIAGNOSIS — J06.9 VIRAL URI WITH COUGH: ICD-10-CM

## 2023-10-03 LAB
FLUAV RNA SPEC QL NAA+PROBE: NEGATIVE
FLUBV RNA SPEC QL NAA+PROBE: NEGATIVE
RSV RNA SPEC QL NAA+PROBE: NEGATIVE
SARS-COV-2 RNA RESP QL NAA+PROBE: POSITIVE

## 2023-10-03 PROCEDURE — 99213 OFFICE O/P EST LOW 20 MIN: CPT | Performed by: STUDENT IN AN ORGANIZED HEALTH CARE EDUCATION/TRAINING PROGRAM

## 2023-10-03 PROCEDURE — 3078F DIAST BP <80 MM HG: CPT | Performed by: STUDENT IN AN ORGANIZED HEALTH CARE EDUCATION/TRAINING PROGRAM

## 2023-10-03 PROCEDURE — 0241U POCT CEPHEID COV-2, FLU A/B, RSV - PCR: CPT | Performed by: STUDENT IN AN ORGANIZED HEALTH CARE EDUCATION/TRAINING PROGRAM

## 2023-10-03 PROCEDURE — 3074F SYST BP LT 130 MM HG: CPT | Performed by: STUDENT IN AN ORGANIZED HEALTH CARE EDUCATION/TRAINING PROGRAM

## 2023-10-03 RX ORDER — ONDANSETRON 4 MG/1
4 TABLET, FILM COATED ORAL EVERY 4 HOURS PRN
Qty: 20 TABLET | Refills: 0 | Status: SHIPPED | OUTPATIENT
Start: 2023-10-03

## 2023-10-03 ASSESSMENT — ENCOUNTER SYMPTOMS
SHORTNESS OF BREATH: 0
CONSTIPATION: 0
PALPITATIONS: 0
HEADACHES: 0
SORE THROAT: 1
WHEEZING: 0
DIARRHEA: 0
VOMITING: 0
MYALGIAS: 1
NAUSEA: 1
FEVER: 1
COUGH: 1
ABDOMINAL PAIN: 0
DIZZINESS: 0
CHILLS: 1

## 2023-10-03 ASSESSMENT — FIBROSIS 4 INDEX: FIB4 SCORE: 1.3

## 2023-10-03 NOTE — PROGRESS NOTES
"Subjective     Emili Alice Awad is a 38 y.o. female who presents with Fever (X2days, Cough, sore throat, body aches, fatigue, )            Emili is a 38 y.o. female who presents to urgent care with fever/chills, body aches, cough, sore throat and fatigue.  Symptoms started approximately 2 days ago.  Patient currently on day #3 of symptoms.  Patient took OTC Tylenol which helped with symptoms.  No shortness of breath/wheezing.  Patient reports episode of nausea.  No vomiting.  No abdominal pain, diarrhea or constipation.    URI   This is a new problem. Episode onset: 2 days ago; day #3 of symptoms. Associated symptoms include congestion, coughing, nausea and a sore throat. Pertinent negatives include no abdominal pain, chest pain, diarrhea, ear pain, headaches, vomiting or wheezing.       Review of Systems   Constitutional:  Positive for chills, fever and malaise/fatigue.   HENT:  Positive for congestion and sore throat. Negative for ear pain.    Respiratory:  Positive for cough. Negative for shortness of breath and wheezing.    Cardiovascular:  Negative for chest pain and palpitations.   Gastrointestinal:  Positive for nausea. Negative for abdominal pain, constipation, diarrhea and vomiting.   Musculoskeletal:  Positive for myalgias.   Neurological:  Negative for dizziness and headaches.   All other systems reviewed and are negative.             Objective     BP 92/58   Pulse 97   Temp 36.9 °C (98.4 °F) (Temporal)   Resp 18   Ht 1.676 m (5' 6\")   Wt 57.4 kg (126 lb 9.6 oz)   SpO2 95%   BMI 20.43 kg/m²      Physical Exam  Vitals reviewed.   Constitutional:       General: She is not in acute distress.     Appearance: Normal appearance. She is ill-appearing. She is not toxic-appearing.   HENT:      Head: Normocephalic and atraumatic.      Right Ear: Tympanic membrane, ear canal and external ear normal.      Left Ear: Tympanic membrane, ear canal and external ear normal.      Nose: Congestion and rhinorrhea " present.      Mouth/Throat:      Mouth: Mucous membranes are moist.      Pharynx: Oropharynx is clear.   Eyes:      Extraocular Movements: Extraocular movements intact.      Conjunctiva/sclera: Conjunctivae normal.      Pupils: Pupils are equal, round, and reactive to light.   Cardiovascular:      Rate and Rhythm: Normal rate and regular rhythm.   Pulmonary:      Effort: Pulmonary effort is normal.      Breath sounds: Normal breath sounds.   Abdominal:      General: Abdomen is flat.      Palpations: Abdomen is soft.   Skin:     General: Skin is warm and dry.   Neurological:      General: No focal deficit present.      Mental Status: She is alert. Mental status is at baseline.                             Assessment & Plan        1. Viral URI with cough  - POCT CoV-2, Flu A/B, RSV by PCR    2. Nausea  - ondansetron (ZOFRAN) 4 MG Tab tablet; Take 1 Tablet by mouth every four hours as needed for Nausea/Vomiting.  Dispense: 20 Tablet; Refill: 0     Differential diagnoses, supportive care measures and indications for immediate follow-up discussed with patient. Pathogenesis of diagnosis discussed including typical length and natural progression.      Instructed to return to urgent care or nearest emergency department if symptoms fail to improve, for any change in condition, further concerns, or new concerning symptoms.    Patient states understanding and agrees with the plan of care and discharge instructions.

## 2023-10-03 NOTE — LETTER
October 3, 2023    To Whom It May Concern:         This is confirmation that Emili Awad attended her scheduled appointment with Mere Morin P.A.-C. on 10/03/23. A concern for COVID-19/FLU has been identified and testing is in progress. We are asking you to excuse absences while following self-isolation protocol per Center for Disease Control (CDC) guidelines.  Your employee will be able to access test results through our electronic delivery system called [employer].    If the results of testing are negative, and once there has been no fever (temperature >100.4 F) for at least 24 hours without treatment, then return to work is approved. If the results of testing are positive then please excuse absenses per CDC guidelines. In general, repeat testing is not necessary and not offered through our [facility] urgent care.    If you have any questions please do not hesitate to call me at the phone number listed below.      Sincerely,    Mere Morin P.A.-C.  [phone]

## 2023-12-04 ENCOUNTER — OFFICE VISIT (OUTPATIENT)
Dept: URGENT CARE | Facility: CLINIC | Age: 38
End: 2023-12-04
Payer: COMMERCIAL

## 2023-12-04 VITALS
TEMPERATURE: 97.6 F | WEIGHT: 126 LBS | BODY MASS INDEX: 20.25 KG/M2 | HEIGHT: 66 IN | OXYGEN SATURATION: 100 % | SYSTOLIC BLOOD PRESSURE: 110 MMHG | RESPIRATION RATE: 16 BRPM | HEART RATE: 53 BPM | DIASTOLIC BLOOD PRESSURE: 72 MMHG

## 2023-12-04 DIAGNOSIS — J02.9 PHARYNGITIS, UNSPECIFIED ETIOLOGY: ICD-10-CM

## 2023-12-04 LAB
FLUAV RNA SPEC QL NAA+PROBE: NEGATIVE
FLUBV RNA SPEC QL NAA+PROBE: NEGATIVE
RSV RNA SPEC QL NAA+PROBE: NEGATIVE
S PYO DNA SPEC NAA+PROBE: NOT DETECTED
SARS-COV-2 RNA RESP QL NAA+PROBE: NEGATIVE

## 2023-12-04 PROCEDURE — 3078F DIAST BP <80 MM HG: CPT | Performed by: PHYSICIAN ASSISTANT

## 2023-12-04 PROCEDURE — 3074F SYST BP LT 130 MM HG: CPT | Performed by: PHYSICIAN ASSISTANT

## 2023-12-04 PROCEDURE — 99213 OFFICE O/P EST LOW 20 MIN: CPT | Performed by: PHYSICIAN ASSISTANT

## 2023-12-04 PROCEDURE — 87651 STREP A DNA AMP PROBE: CPT | Performed by: PHYSICIAN ASSISTANT

## 2023-12-04 PROCEDURE — 0241U POCT CEPHEID COV-2, FLU A/B, RSV - PCR: CPT | Performed by: PHYSICIAN ASSISTANT

## 2023-12-04 RX ORDER — DEXAMETHASONE SODIUM PHOSPHATE 10 MG/ML
10 INJECTION INTRAMUSCULAR; INTRAVENOUS ONCE
Status: COMPLETED | OUTPATIENT
Start: 2023-12-04 | End: 2023-12-04

## 2023-12-04 RX ADMIN — DEXAMETHASONE SODIUM PHOSPHATE 10 MG: 10 INJECTION INTRAMUSCULAR; INTRAVENOUS at 08:49

## 2023-12-04 ASSESSMENT — ENCOUNTER SYMPTOMS
SPUTUM PRODUCTION: 0
VOMITING: 0
MYALGIAS: 1
NAUSEA: 0
SORE THROAT: 1
DIARRHEA: 0
SHORTNESS OF BREATH: 0
WHEEZING: 0
COUGH: 0
ABDOMINAL PAIN: 0
CHILLS: 0
FEVER: 0

## 2023-12-04 ASSESSMENT — FIBROSIS 4 INDEX: FIB4 SCORE: 1.3

## 2023-12-04 NOTE — PROGRESS NOTES
"Subjective:   Emili Awad  is a 38 y.o. female who presents for Sore Throat (X 2 days )      Pharyngitis   This is a new problem. The current episode started yesterday. Pertinent negatives include no abdominal pain, coughing, diarrhea, ear pain, shortness of breath or vomiting.   Patient presents urgent care noting last 2 to 3 days of symptoms of sore throat.  Did have some chills body aches and fatigue over the weekend.  Denies much coughing.  Denies significant ear pain.  Denies vomiting abdominal pain or diarrhea.  Denies history of asthma or pneumonia.  Patient has had COVID multiple times.  Has been taking OTC medications for sore throat.  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    Review of Systems   Constitutional:  Positive for malaise/fatigue. Negative for chills and fever.   HENT:  Positive for sore throat. Negative for ear pain.    Respiratory:  Negative for cough, sputum production, shortness of breath and wheezing.    Gastrointestinal:  Negative for abdominal pain, diarrhea, nausea and vomiting.   Musculoskeletal:  Positive for myalgias.   Skin:  Negative for rash.       Allergies   Allergen Reactions    Macrobid [Nitrofurantoin] Vomiting        Objective:   /72   Pulse (!) 53   Temp 36.4 °C (97.6 °F)   Resp 16   Ht 1.676 m (5' 6\")   Wt 57.2 kg (126 lb)   SpO2 100%   BMI 20.34 kg/m²     Physical Exam  Vitals and nursing note reviewed.   Constitutional:       General: She is not in acute distress.     Appearance: She is well-developed. She is not diaphoretic.   HENT:      Head: Normocephalic and atraumatic.      Right Ear: Tympanic membrane, ear canal and external ear normal.      Left Ear: Tympanic membrane, ear canal and external ear normal.      Nose: Nose normal.      Mouth/Throat:      Mouth: Mucous membranes are moist.      Pharynx: Uvula midline. Posterior oropharyngeal erythema ( mild PND) present. No oropharyngeal exudate.      Tonsils: No " tonsillar abscesses.   Eyes:      General: Lids are normal. No scleral icterus.        Right eye: No discharge.         Left eye: No discharge.      Conjunctiva/sclera: Conjunctivae normal.   Pulmonary:      Effort: Pulmonary effort is normal. No respiratory distress.      Breath sounds: Normal breath sounds. No stridor. No decreased breath sounds, wheezing, rhonchi or rales.   Musculoskeletal:         General: Normal range of motion.      Cervical back: Neck supple.   Skin:     General: Skin is warm and dry.      Coloration: Skin is not pale.      Findings: No erythema.   Neurological:      Mental Status: She is alert and oriented to person, place, and time. She is not disoriented.   Psychiatric:         Speech: Speech normal.         Behavior: Behavior normal.     Decadron 10 mg oral-tolerates well    Point-of-care strep test negative  Point-of-care test for COVID RSV and influenza is negative    Assessment/Plan:   1. Pharyngitis, unspecified etiology  - POCT Cepheid Group A Strep - PCR  - dexamethasone (Decadron) injection (check route below) 10 mg  - POCT CoV-2, Flu A/B, RSV by PCR  Supportive care is reviewed with patient/caregiver - recommend to push PO fluids and electrolytes, suspect self-limiting viral etiology, over-the-counter throat analgesics and supportive care reviewed, negative for strep and viral testing  Return to clinic with lack of resolution or progression of symptoms.      I have worn an N95 mask, gloves and eye protection for the entire encounter with this patient.     Differential diagnosis, natural history, supportive care, and indications for immediate follow-up discussed.

## 2023-12-04 NOTE — LETTER
[facility]  [address] [address]     December 4, 2023    Patient: Emili Awad   YOB: 1985   Date of Visit: 12/4/2023       To Whom It May Concern:    Emili Awad was seen and treated in our department on 12/4/2023.   She should be excused from missed work for today and tomorrow.    Sincerely,     Ranjan Ewing P.A.-C.

## 2023-12-14 ENCOUNTER — NON-PROVIDER VISIT (OUTPATIENT)
Dept: OBGYN | Facility: CLINIC | Age: 38
End: 2023-12-14
Payer: COMMERCIAL

## 2023-12-14 DIAGNOSIS — Z23 NEED FOR HPV VACCINATION: ICD-10-CM

## 2023-12-14 PROCEDURE — 90651 9VHPV VACCINE 2/3 DOSE IM: CPT | Performed by: OBSTETRICS & GYNECOLOGY

## 2023-12-14 PROCEDURE — 90471 IMMUNIZATION ADMIN: CPT | Performed by: OBSTETRICS & GYNECOLOGY

## 2023-12-14 NOTE — PROGRESS NOTES
Pt here for final gardasil vaccine    NDC: 6015-0575-00  LOT#: [id number]  Expiration Date: 26   Dose: 0.5 ml   Site: Right Deltoid  Patient educated on use and side effects of medication. Name and  verified prior to injection. Pt tolerated? yes   Verified by HF  Administered by Rogelio Green'cayetano at 9:06 AM.  Patient Provided Medication: no

## 2023-12-22 ENCOUNTER — OFFICE VISIT (OUTPATIENT)
Dept: URGENT CARE | Facility: CLINIC | Age: 38
End: 2023-12-22
Payer: COMMERCIAL

## 2023-12-22 VITALS
TEMPERATURE: 98.1 F | HEIGHT: 66 IN | WEIGHT: 126 LBS | DIASTOLIC BLOOD PRESSURE: 68 MMHG | RESPIRATION RATE: 16 BRPM | BODY MASS INDEX: 20.25 KG/M2 | HEART RATE: 76 BPM | OXYGEN SATURATION: 97 % | SYSTOLIC BLOOD PRESSURE: 110 MMHG

## 2023-12-22 DIAGNOSIS — R09.81 NASAL CONGESTION: ICD-10-CM

## 2023-12-22 DIAGNOSIS — J01.10 ACUTE NON-RECURRENT FRONTAL SINUSITIS: ICD-10-CM

## 2023-12-22 DIAGNOSIS — R05.1 ACUTE COUGH: ICD-10-CM

## 2023-12-22 PROCEDURE — 3074F SYST BP LT 130 MM HG: CPT

## 2023-12-22 PROCEDURE — 3078F DIAST BP <80 MM HG: CPT

## 2023-12-22 PROCEDURE — 99213 OFFICE O/P EST LOW 20 MIN: CPT

## 2023-12-22 RX ORDER — PREDNISONE 20 MG/1
20 TABLET ORAL 2 TIMES DAILY
Qty: 10 TABLET | Refills: 0 | Status: SHIPPED | OUTPATIENT
Start: 2023-12-22 | End: 2023-12-27

## 2023-12-22 RX ORDER — AMOXICILLIN AND CLAVULANATE POTASSIUM 875; 125 MG/1; MG/1
1 TABLET, FILM COATED ORAL 2 TIMES DAILY
Qty: 10 TABLET | Refills: 0 | Status: SHIPPED | OUTPATIENT
Start: 2023-12-22 | End: 2023-12-27

## 2023-12-22 RX ORDER — BENZONATATE 100 MG/1
100 CAPSULE ORAL 3 TIMES DAILY PRN
Qty: 30 CAPSULE | Refills: 0 | Status: SHIPPED | OUTPATIENT
Start: 2023-12-22

## 2023-12-22 RX ORDER — FLUTICASONE PROPIONATE 50 MCG
1 SPRAY, SUSPENSION (ML) NASAL DAILY
Qty: 16 G | Refills: 0 | Status: SHIPPED | OUTPATIENT
Start: 2023-12-22

## 2023-12-22 RX ORDER — ALBUTEROL SULFATE 90 UG/1
2 AEROSOL, METERED RESPIRATORY (INHALATION) EVERY 6 HOURS PRN
Qty: 8.5 G | Refills: 0 | Status: SHIPPED | OUTPATIENT
Start: 2023-12-22

## 2023-12-22 ASSESSMENT — ENCOUNTER SYMPTOMS
CARDIOVASCULAR NEGATIVE: 1
SINUS PAIN: 1
NAUSEA: 0
MYALGIAS: 1
CHILLS: 0
SHORTNESS OF BREATH: 0
ABDOMINAL PAIN: 0
FEVER: 0
HEADACHES: 1
SORE THROAT: 0
VOMITING: 0
COUGH: 1

## 2023-12-22 ASSESSMENT — FIBROSIS 4 INDEX: FIB4 SCORE: 1.3

## 2023-12-22 NOTE — PROGRESS NOTES
Chief Complaint   Patient presents with    Cough     X 3 weeks    Congestion     X 3 weeks    Nasal Congestion     X 3 weeks       HISTORY OF PRESENT ILLNESS: Patient is a pleasant 38 y.o. female who presents to urgent care today cold symptoms for the last 3 weeks with increasing sinus pain and pressure and headaches along with nasal congestion.  Patient denies any fevers, taking OTC meds with little to no relief.    Patient Active Problem List    Diagnosis Date Noted    Macrocytosis 07/24/2020    Vitamin D deficiency 05/23/2019    Premature ventricular complex 09/15/2017    Secondary amenorrhea 01/13/2017    BMI less than 19,adult 12/13/2016    Irregular menses 12/13/2016       Allergies:Macrobid [nitrofurantoin]    Current Outpatient Medications Ordered in Epic   Medication Sig Dispense Refill    fluticasone (FLONASE) 50 MCG/ACT nasal spray Administer 1 Spray into affected nostril(S) every day. 16 g 0    albuterol 108 (90 Base) MCG/ACT Aero Soln inhalation aerosol Inhale 2 Puffs every 6 hours as needed for Shortness of Breath. 8.5 g 0    benzonatate (TESSALON) 100 MG Cap Take 1 Capsule by mouth 3 times a day as needed for Cough. 30 Capsule 0    predniSONE (DELTASONE) 20 MG Tab Take 1 Tablet by mouth 2 times a day for 5 days. 10 Tablet 0    amoxicillin-clavulanate (AUGMENTIN) 875-125 MG Tab Take 1 Tablet by mouth 2 times a day for 5 days. 10 Tablet 0    Ascorbic Acid (VITAMIN C PO) Take  by mouth.      Multiple Vitamins-Minerals (MULTIVITAMIN PO) Take  by mouth.      Probiotic Product (PROBIOTIC DAILY PO) Take  by mouth.      ondansetron (ZOFRAN) 4 MG Tab tablet Take 1 Tablet by mouth every four hours as needed for Nausea/Vomiting. (Patient not taking: Reported on 12/22/2023) 20 Tablet 0     No current Epic-ordered facility-administered medications on file.       Past Medical History:   Diagnosis Date    Cardiac arrhythmia     Chickenpox     Eczema     Episodic lightheadedness 6/23/2017    H. pylori infection 2015  "   treated    Hypokalemia     IUD (intrauterine device) in place     Needle stick injury     took medications, pt was tested negative       Social History     Tobacco Use    Smoking status: Never    Smokeless tobacco: Never   Vaping Use    Vaping Use: Never used   Substance Use Topics    Alcohol use: Yes     Alcohol/week: 0.0 oz     Comment: occasional, 3x week    Drug use: No       Family Status   Relation Name Status    Mo  (Not Specified)    Fa  (Not Specified)    PUnc  (Not Specified)     Family History   Problem Relation Age of Onset    Diabetes Mother     Hypertension Father     Cancer Paternal Uncle         lymphoma       Review of Systems   Constitutional:  Positive for malaise/fatigue. Negative for chills and fever.   HENT:  Positive for congestion and sinus pain. Negative for ear pain and sore throat.    Respiratory:  Positive for cough. Negative for shortness of breath.    Cardiovascular: Negative.    Gastrointestinal:  Negative for abdominal pain, nausea and vomiting.   Musculoskeletal:  Positive for myalgias.   Neurological:  Positive for headaches.       Exam:  /68 (BP Location: Left arm, Patient Position: Sitting, BP Cuff Size: Large adult)   Pulse 76   Temp 36.7 °C (98.1 °F)   Resp 16   Ht 1.676 m (5' 6\")   Wt 57.2 kg (126 lb)   SpO2 97%   Physical Exam  Constitutional:       Appearance: Normal appearance. She is normal weight. She is not toxic-appearing.   HENT:      Head: Normocephalic.      Right Ear: Tympanic membrane, ear canal and external ear normal. There is no impacted cerumen.      Left Ear: Tympanic membrane, ear canal and external ear normal. There is no impacted cerumen.      Nose: Congestion and rhinorrhea present. No nasal tenderness or mucosal edema. Rhinorrhea is purulent.      Right Sinus: Maxillary sinus tenderness and frontal sinus tenderness present.      Left Sinus: Maxillary sinus tenderness and frontal sinus tenderness present.      Mouth/Throat:      Mouth: Mucous " membranes are moist.      Pharynx: Posterior oropharyngeal erythema present. No oropharyngeal exudate.      Tonsils: No tonsillar exudate. 1+ on the right. 1+ on the left.   Eyes:      General:         Right eye: No discharge.         Left eye: No discharge.      Extraocular Movements: Extraocular movements intact.      Conjunctiva/sclera: Conjunctivae normal.      Pupils: Pupils are equal, round, and reactive to light.   Cardiovascular:      Rate and Rhythm: Normal rate and regular rhythm.      Pulses: Normal pulses.      Heart sounds: Normal heart sounds. No murmur heard.  Pulmonary:      Effort: Pulmonary effort is normal. No respiratory distress.      Breath sounds: Normal breath sounds. No stridor. No wheezing, rhonchi or rales.   Musculoskeletal:         General: No swelling, tenderness, deformity or signs of injury.      Cervical back: Normal range of motion and neck supple. No tenderness.      Right lower leg: No edema.      Left lower leg: No edema.   Skin:     General: Skin is warm and dry.      Capillary Refill: Capillary refill takes less than 2 seconds.      Findings: No bruising, erythema, lesion or rash.   Neurological:      General: No focal deficit present.      Mental Status: She is alert.      Sensory: No sensory deficit.      Motor: No weakness.      Coordination: Coordination normal.      Gait: Gait normal.   Psychiatric:         Mood and Affect: Mood normal.         Behavior: Behavior normal.         Thought Content: Thought content normal.         Judgment: Judgment normal.             Assessment/Plan:  1. Nasal congestion  - fluticasone (FLONASE) 50 MCG/ACT nasal spray; Administer 1 Spray into affected nostril(S) every day.  Dispense: 16 g; Refill: 0    2. Acute cough  - albuterol 108 (90 Base) MCG/ACT Aero Soln inhalation aerosol; Inhale 2 Puffs every 6 hours as needed for Shortness of Breath.  Dispense: 8.5 g; Refill: 0  - benzonatate (TESSALON) 100 MG Cap; Take 1 Capsule by mouth 3 times a  day as needed for Cough.  Dispense: 30 Capsule; Refill: 0  - predniSONE (DELTASONE) 20 MG Tab; Take 1 Tablet by mouth 2 times a day for 5 days.  Dispense: 10 Tablet; Refill: 0    3. Acute non-recurrent frontal sinusitis  - amoxicillin-clavulanate (AUGMENTIN) 875-125 MG Tab; Take 1 Tablet by mouth 2 times a day for 5 days.  Dispense: 10 Tablet; Refill: 0    Patient comes in today with symptoms of sinus infection, symptoms for the last 3 weeks.  Symptoms include nasal congestion, sinus pain and pressure with light palpation, rhinorrhea, headaches, cough.  They have taken OTC meds with little to no relief.  Patient has no major previous medical history.  On physical exam patient has noted sinus pain and pressure with light palpation, rhinorrhea, purulent nasal drainage.  Patient given Augmentin, Flonase, Tessalon, prednisone for comfort measures.  Patient has been sick for nearly 3 weeks, at this point I do think she would likely benefit from some Augmentin.  Encouraged the use of fluids, Motrin and Tylenol, vitamin C, D and zinc.  Patient is aware of the plan of care and agreeable at this time, advised to follow-up if they continue to get worse or do not improve.    Supportive care, differential diagnoses, and indications for immediate follow-up discussed with patient.   Pathogenesis of diagnosis discussed including typical length and natural progression.   Instructed to return to clinic or nearest emergency department for any change in condition, further concerns, or worsening of symptoms.  Patient states understanding of the plan of care and discharge instructions.  Instructed to make an appointment, for follow up, with primary care provider.      Please note that this dictation was created using voice recognition software. I have made every reasonable attempt to correct obvious errors, but I expect that there are errors of grammar and possibly content that I did not discover before finalizing the note.      Adali  Melani LOUN

## 2024-02-08 ENCOUNTER — GYNECOLOGY VISIT (OUTPATIENT)
Dept: OBGYN | Facility: CLINIC | Age: 39
End: 2024-02-08
Payer: COMMERCIAL

## 2024-02-08 ENCOUNTER — HOSPITAL ENCOUNTER (OUTPATIENT)
Facility: MEDICAL CENTER | Age: 39
End: 2024-02-08
Attending: OBSTETRICS & GYNECOLOGY
Payer: COMMERCIAL

## 2024-02-08 VITALS — SYSTOLIC BLOOD PRESSURE: 108 MMHG | DIASTOLIC BLOOD PRESSURE: 66 MMHG | BODY MASS INDEX: 20.98 KG/M2 | WEIGHT: 130 LBS

## 2024-02-08 DIAGNOSIS — Z12.4 SCREENING FOR CERVICAL CANCER: ICD-10-CM

## 2024-02-08 DIAGNOSIS — R87.612 LGSIL ON PAP SMEAR OF CERVIX: ICD-10-CM

## 2024-02-08 DIAGNOSIS — Z97.5 IUD (INTRAUTERINE DEVICE) IN PLACE: ICD-10-CM

## 2024-02-08 DIAGNOSIS — Z01.419 WOMEN'S ANNUAL ROUTINE GYNECOLOGICAL EXAMINATION: ICD-10-CM

## 2024-02-08 PROCEDURE — 87491 CHLMYD TRACH DNA AMP PROBE: CPT

## 2024-02-08 PROCEDURE — 3078F DIAST BP <80 MM HG: CPT | Performed by: OBSTETRICS & GYNECOLOGY

## 2024-02-08 PROCEDURE — 87591 N.GONORRHOEAE DNA AMP PROB: CPT

## 2024-02-08 PROCEDURE — 3074F SYST BP LT 130 MM HG: CPT | Performed by: OBSTETRICS & GYNECOLOGY

## 2024-02-08 PROCEDURE — 87624 HPV HI-RISK TYP POOLED RSLT: CPT

## 2024-02-08 PROCEDURE — 88175 CYTOPATH C/V AUTO FLUID REDO: CPT

## 2024-02-08 PROCEDURE — 99395 PREV VISIT EST AGE 18-39: CPT | Performed by: OBSTETRICS & GYNECOLOGY

## 2024-02-08 ASSESSMENT — FIBROSIS 4 INDEX: FIB4 SCORE: 1.3

## 2024-02-08 NOTE — PROGRESS NOTES
Patient here for annual exam  Last pap done/result: 01/24/2023  LMP: 1 month ago  BCM: Mirena  Last mammogram, if applicable: n/a   Phone number: [phone] (home)   Pharmacy verified

## 2024-02-15 LAB
C TRACH RRNA CVX QL NAA+PROBE: NEGATIVE
CYTOLOGIST CVX/VAG CYTO: NORMAL
CYTOLOGY CVX/VAG DOC CYTO: NORMAL
CYTOLOGY CVX/VAG DOC THIN PREP: NORMAL
HPV I/H RISK 4 DNA CVX QL PROBE+SIG AMP: NEGATIVE
N GONORRHOEA RRNA CVX QL NAA+PROBE: NEGATIVE
NOTE NL11727A: NORMAL
OTHER STN SPEC: NORMAL
QC REVIEWED BY NL11722A: NORMAL
STAT OF ADQ CVX/VAG CYTO-IMP: NORMAL

## 2024-05-16 ENCOUNTER — OFFICE VISIT (OUTPATIENT)
Dept: MEDICAL GROUP | Facility: MEDICAL CENTER | Age: 39
End: 2024-05-16
Payer: COMMERCIAL

## 2024-05-16 VITALS
BODY MASS INDEX: 21.05 KG/M2 | DIASTOLIC BLOOD PRESSURE: 64 MMHG | HEIGHT: 66 IN | TEMPERATURE: 97.6 F | HEART RATE: 60 BPM | SYSTOLIC BLOOD PRESSURE: 116 MMHG | WEIGHT: 131 LBS | OXYGEN SATURATION: 96 %

## 2024-05-16 DIAGNOSIS — D75.89 MACROCYTOSIS: ICD-10-CM

## 2024-05-16 DIAGNOSIS — Z13.29 SCREENING FOR THYROID DISORDER: ICD-10-CM

## 2024-05-16 DIAGNOSIS — Z13.220 SCREENING FOR LIPID DISORDERS: ICD-10-CM

## 2024-05-16 DIAGNOSIS — Z00.00 ANNUAL PHYSICAL EXAM: ICD-10-CM

## 2024-05-16 DIAGNOSIS — Z13.1 SCREENING FOR DIABETES MELLITUS: ICD-10-CM

## 2024-05-16 DIAGNOSIS — E55.9 VITAMIN D DEFICIENCY: ICD-10-CM

## 2024-05-16 PROCEDURE — 99213 OFFICE O/P EST LOW 20 MIN: CPT | Mod: 25 | Performed by: FAMILY MEDICINE

## 2024-05-16 PROCEDURE — 3078F DIAST BP <80 MM HG: CPT | Performed by: FAMILY MEDICINE

## 2024-05-16 PROCEDURE — 3074F SYST BP LT 130 MM HG: CPT | Performed by: FAMILY MEDICINE

## 2024-05-16 PROCEDURE — 99395 PREV VISIT EST AGE 18-39: CPT | Performed by: FAMILY MEDICINE

## 2024-05-16 ASSESSMENT — PATIENT HEALTH QUESTIONNAIRE - PHQ9: CLINICAL INTERPRETATION OF PHQ2 SCORE: 0

## 2024-05-16 ASSESSMENT — ENCOUNTER SYMPTOMS
EYE DISCHARGE: 0
PALPITATIONS: 0
SENSORY CHANGE: 0
DIARRHEA: 0
COUGH: 0
EYE REDNESS: 0
NERVOUS/ANXIOUS: 0
SPUTUM PRODUCTION: 0
DEPRESSION: 0
WHEEZING: 0
MYALGIAS: 0
VOMITING: 0
SORE THROAT: 0
NAUSEA: 0
SHORTNESS OF BREATH: 0
ABDOMINAL PAIN: 0
SINUS PAIN: 0
CHILLS: 0
DIZZINESS: 0
HEADACHES: 0
CONSTIPATION: 0
EYE PAIN: 0
FEVER: 0
HEMOPTYSIS: 0
FOCAL WEAKNESS: 0

## 2024-05-16 ASSESSMENT — FIBROSIS 4 INDEX: FIB4 SCORE: 1.34

## 2024-05-16 NOTE — PROGRESS NOTES
Assessment/Plan:    Emili was seen today for annual exam.    Diagnoses and all orders for this visit:    Annual physical exam  Up-to-date for cancer screenings and vaccines.  Recommended to get COVID-vaccine at pharmacy if she wishes to get this vaccine.  Anticipatory guidance discussed below in the note.    Screening for diabetes mellitus  -     Comp Metabolic Panel; Future  -     HEMOGLOBIN A1C; Future    Screening for thyroid disorder  -     TSH+FREE T4    Screening for lipid disorders  -     Lipid Profile; Future    Vitamin D deficiency  Chronic condition, stable, continue vitamin D supplementation.  Recheck labs with next blood test    -     VITAMIN D,25 HYDROXY (DEFICIENCY); Future    Macrocytosis  Chronic condition, unstable, recheck labs to assess this further.    -     CBC WITH DIFFERENTIAL; Future  -     VITAMIN B12; Future    Follow-up in 1 year for annual physical, sooner if blood test results are abnormal.      Chief Complaint.    Chief Complaint   Patient presents with    Annual Exam         History of Present Illness      Previous blood test showed macrocytosis.  Will recheck her blood test.  Vitamin B12 was checked previously.  She has history of vitamin D deficiency and she is currently taking multivitamins.    Health Maintenance  Advanced directive: n/a  Osteoporosis Screen/ DEXA: n/a  PT/vit D for falls prevention: n/a   Cholesterol Screening:   Lab Results   Component Value Date/Time    CHOLSTRLTOT 162 12/22/2022 1045    TRIGLYCERIDE 60 12/22/2022 1045    HDL 76 12/22/2022 1045    LDL 74 12/22/2022 1045      Diabetes Screening: Ordered A1c  AAA Screening (65 to 74 year male): n/a   Aspirin Use: n/a    Below anticipatory guidance discussed with patient  Diet:  counseled regarding eating healthy diet  Exercise: Counseled regarding exercise 150 minutes in a week  Substance Abuse: None  Safe in relationship.  Yes  Seat belts, bike helmet, gun safety discussed.  Sun protection use  "discussed    Cancer screening  Colorectal Cancer Screening: Discussed colon cancer screening start at age 45  Lung Cancer Screening: She does not smoke  Cervical Cancer Screening: Up-to-date for Pap smear  Breast Cancer Screening: Discussed breast cancer screening start at age 40    Infectious disease screening/Immunizations  --STI Screening: None  --Immunizations: Up-to-date, recommended to get COVID-vaccine at pharmacy       Review of Systems   Constitutional:  Negative for chills, fever and malaise/fatigue.   HENT:  Negative for ear discharge, ear pain, hearing loss, sinus pain and sore throat.    Eyes:  Negative for pain, discharge and redness.   Respiratory:  Negative for cough, hemoptysis, sputum production, shortness of breath and wheezing.    Cardiovascular:  Negative for chest pain, palpitations and leg swelling.   Gastrointestinal:  Negative for abdominal pain, constipation, diarrhea, nausea and vomiting.   Genitourinary:  Negative for dysuria, frequency and urgency.   Musculoskeletal:  Negative for joint pain and myalgias.   Skin:  Negative for itching and rash.   Neurological:  Negative for dizziness, sensory change, focal weakness and headaches.   Psychiatric/Behavioral:  Negative for depression. The patient is not nervous/anxious.             Current Outpatient Medications   Medication Sig Dispense Refill    Ascorbic Acid (VITAMIN C PO) Take  by mouth.      Multiple Vitamins-Minerals (MULTIVITAMIN PO) Take  by mouth.      Probiotic Product (PROBIOTIC DAILY PO) Take  by mouth.       No current facility-administered medications for this visit.        Allergies   Allergen Reactions    Macrobid [Nitrofurantoin] Vomiting         /64   Pulse 60   Temp 36.4 °C (97.6 °F)   Ht 1.676 m (5' 6\")   Wt 59.4 kg (131 lb)   SpO2 96%  Body mass index is 21.14 kg/m².      Physical Exam  Constitutional:       Appearance: Normal appearance. She is well-developed and well-groomed.   HENT:      Head: Normocephalic " and atraumatic.      Right Ear: Tympanic membrane, ear canal and external ear normal.      Left Ear: Tympanic membrane, ear canal and external ear normal.      Nose: Nose normal.      Mouth/Throat:      Mouth: Mucous membranes are moist.      Pharynx: No oropharyngeal exudate or posterior oropharyngeal erythema.   Eyes:      General:         Right eye: No discharge.         Left eye: No discharge.      Conjunctiva/sclera: Conjunctivae normal.   Neck:      Thyroid: No thyromegaly.   Cardiovascular:      Rate and Rhythm: Normal rate and regular rhythm.      Heart sounds: Normal heart sounds. No murmur heard.  Pulmonary:      Effort: Pulmonary effort is normal. No respiratory distress.      Breath sounds: Normal breath sounds. No wheezing or rales.   Abdominal:      General: Bowel sounds are normal. There is no distension.      Palpations: Abdomen is soft.      Tenderness: There is no abdominal tenderness. There is no guarding.   Musculoskeletal:         General: Normal range of motion.      Cervical back: Neck supple.      Right lower leg: No edema.      Left lower leg: No edema.   Skin:     General: Skin is warm.      Findings: No rash.   Neurological:      General: No focal deficit present.      Mental Status: She is alert. Mental status is at baseline.      Gait: Gait is intact.   Psychiatric:         Mood and Affect: Mood and affect normal.         Behavior: Behavior normal.            Results             I discussed with patient that annual physical covers screening, immunizations,counseling.  Other problem list that was addressed during this visit including diagnostic labs, diagnostic imagings, medications will be billed as a regular visit which can have copay per patient insurance.  Patient reports understanding of this       Please note that this dictation was created using voice recognition software. I have made every reasonable attempt to correct obvious errors, but I expect that there are errors of grammar  and possibly content that I did not discover before finalizing the note.

## 2024-06-20 ENCOUNTER — HOSPITAL ENCOUNTER (OUTPATIENT)
Dept: LAB | Facility: MEDICAL CENTER | Age: 39
End: 2024-06-20
Attending: FAMILY MEDICINE
Payer: COMMERCIAL

## 2024-06-20 DIAGNOSIS — E55.9 VITAMIN D DEFICIENCY: ICD-10-CM

## 2024-06-20 DIAGNOSIS — Z13.220 SCREENING FOR LIPID DISORDERS: ICD-10-CM

## 2024-06-20 DIAGNOSIS — D75.89 MACROCYTOSIS: ICD-10-CM

## 2024-06-20 DIAGNOSIS — Z13.1 SCREENING FOR DIABETES MELLITUS: ICD-10-CM

## 2024-06-20 LAB
25(OH)D3 SERPL-MCNC: 42 NG/ML (ref 30–100)
ALBUMIN SERPL BCP-MCNC: 4.1 G/DL (ref 3.2–4.9)
ALBUMIN/GLOB SERPL: 1.5 G/DL
ALP SERPL-CCNC: 83 U/L (ref 30–99)
ALT SERPL-CCNC: 12 U/L (ref 2–50)
ANION GAP SERPL CALC-SCNC: 11 MMOL/L (ref 7–16)
AST SERPL-CCNC: 24 U/L (ref 12–45)
BASOPHILS # BLD AUTO: 0.6 % (ref 0–1.8)
BASOPHILS # BLD: 0.04 K/UL (ref 0–0.12)
BILIRUB SERPL-MCNC: 0.7 MG/DL (ref 0.1–1.5)
BUN SERPL-MCNC: 16 MG/DL (ref 8–22)
CALCIUM ALBUM COR SERPL-MCNC: 9.2 MG/DL (ref 8.5–10.5)
CALCIUM SERPL-MCNC: 9.3 MG/DL (ref 8.5–10.5)
CHLORIDE SERPL-SCNC: 104 MMOL/L (ref 96–112)
CHOLEST SERPL-MCNC: 158 MG/DL (ref 100–199)
CO2 SERPL-SCNC: 23 MMOL/L (ref 20–33)
CREAT SERPL-MCNC: 0.63 MG/DL (ref 0.5–1.4)
EOSINOPHIL # BLD AUTO: 0.06 K/UL (ref 0–0.51)
EOSINOPHIL NFR BLD: 1 % (ref 0–6.9)
ERYTHROCYTE [DISTWIDTH] IN BLOOD BY AUTOMATED COUNT: 46.1 FL (ref 35.9–50)
EST. AVERAGE GLUCOSE BLD GHB EST-MCNC: 103 MG/DL
GFR SERPLBLD CREATININE-BSD FMLA CKD-EPI: 117 ML/MIN/1.73 M 2
GLOBULIN SER CALC-MCNC: 2.7 G/DL (ref 1.9–3.5)
GLUCOSE SERPL-MCNC: 69 MG/DL (ref 65–99)
HBA1C MFR BLD: 5.2 % (ref 4–5.6)
HCT VFR BLD AUTO: 42.4 % (ref 37–47)
HDLC SERPL-MCNC: 66 MG/DL
HGB BLD-MCNC: 14.5 G/DL (ref 12–16)
IMM GRANULOCYTES # BLD AUTO: 0.01 K/UL (ref 0–0.11)
IMM GRANULOCYTES NFR BLD AUTO: 0.2 % (ref 0–0.9)
LDLC SERPL CALC-MCNC: 80 MG/DL
LYMPHOCYTES # BLD AUTO: 2.55 K/UL (ref 1–4.8)
LYMPHOCYTES NFR BLD: 40.7 % (ref 22–41)
MCH RBC QN AUTO: 33.5 PG (ref 27–33)
MCHC RBC AUTO-ENTMCNC: 34.2 G/DL (ref 32.2–35.5)
MCV RBC AUTO: 97.9 FL (ref 81.4–97.8)
MONOCYTES # BLD AUTO: 0.54 K/UL (ref 0–0.85)
MONOCYTES NFR BLD AUTO: 8.6 % (ref 0–13.4)
NEUTROPHILS # BLD AUTO: 3.07 K/UL (ref 1.82–7.42)
NEUTROPHILS NFR BLD: 48.9 % (ref 44–72)
NRBC # BLD AUTO: 0 K/UL
NRBC BLD-RTO: 0 /100 WBC (ref 0–0.2)
PLATELET # BLD AUTO: 178 K/UL (ref 164–446)
PMV BLD AUTO: 11.8 FL (ref 9–12.9)
POTASSIUM SERPL-SCNC: 4.1 MMOL/L (ref 3.6–5.5)
PROT SERPL-MCNC: 6.8 G/DL (ref 6–8.2)
RBC # BLD AUTO: 4.33 M/UL (ref 4.2–5.4)
SODIUM SERPL-SCNC: 138 MMOL/L (ref 135–145)
T4 FREE SERPL-MCNC: 1.05 NG/DL (ref 0.93–1.7)
TRIGL SERPL-MCNC: 59 MG/DL (ref 0–149)
TSH SERPL DL<=0.005 MIU/L-ACNC: 1.94 UIU/ML (ref 0.38–5.33)
VIT B12 SERPL-MCNC: 1110 PG/ML (ref 211–911)
WBC # BLD AUTO: 6.3 K/UL (ref 4.8–10.8)

## 2024-06-20 PROCEDURE — 36415 COLL VENOUS BLD VENIPUNCTURE: CPT

## 2024-06-20 PROCEDURE — 80061 LIPID PANEL: CPT

## 2024-06-20 PROCEDURE — 84443 ASSAY THYROID STIM HORMONE: CPT

## 2024-06-20 PROCEDURE — 83036 HEMOGLOBIN GLYCOSYLATED A1C: CPT

## 2024-06-20 PROCEDURE — 82306 VITAMIN D 25 HYDROXY: CPT

## 2024-06-20 PROCEDURE — 85025 COMPLETE CBC W/AUTO DIFF WBC: CPT

## 2024-06-20 PROCEDURE — 80053 COMPREHEN METABOLIC PANEL: CPT

## 2024-06-20 PROCEDURE — 82607 VITAMIN B-12: CPT

## 2024-06-20 PROCEDURE — 84439 ASSAY OF FREE THYROXINE: CPT

## 2025-02-05 ENCOUNTER — PATIENT MESSAGE (OUTPATIENT)
Dept: MEDICAL GROUP | Facility: MEDICAL CENTER | Age: 40
End: 2025-02-05
Payer: COMMERCIAL

## 2025-02-05 DIAGNOSIS — N92.6 IRREGULAR MENSTRUAL CYCLE: ICD-10-CM

## 2025-02-05 DIAGNOSIS — Z13.220 SCREENING FOR LIPID DISORDERS: ICD-10-CM

## 2025-02-05 DIAGNOSIS — Z13.1 SCREENING FOR DIABETES MELLITUS: ICD-10-CM

## 2025-02-05 DIAGNOSIS — E55.9 VITAMIN D DEFICIENCY: ICD-10-CM

## 2025-02-05 DIAGNOSIS — D75.89 MACROCYTOSIS: ICD-10-CM

## 2025-02-07 ENCOUNTER — PATIENT MESSAGE (OUTPATIENT)
Dept: MEDICAL GROUP | Facility: MEDICAL CENTER | Age: 40
End: 2025-02-07
Payer: COMMERCIAL

## 2025-03-19 ENCOUNTER — HOSPITAL ENCOUNTER (OUTPATIENT)
Facility: MEDICAL CENTER | Age: 40
End: 2025-03-19
Attending: OBSTETRICS & GYNECOLOGY
Payer: COMMERCIAL

## 2025-03-19 ENCOUNTER — GYNECOLOGY VISIT (OUTPATIENT)
Dept: OBGYN | Facility: CLINIC | Age: 40
End: 2025-03-19
Payer: COMMERCIAL

## 2025-03-19 VITALS
WEIGHT: 131.6 LBS | BODY MASS INDEX: 21.15 KG/M2 | HEIGHT: 66 IN | DIASTOLIC BLOOD PRESSURE: 71 MMHG | SYSTOLIC BLOOD PRESSURE: 116 MMHG

## 2025-03-19 DIAGNOSIS — Z97.5 IUD (INTRAUTERINE DEVICE) IN PLACE: ICD-10-CM

## 2025-03-19 DIAGNOSIS — Z12.4 SCREENING FOR CERVICAL CANCER: ICD-10-CM

## 2025-03-19 DIAGNOSIS — Z01.419 WOMEN'S ANNUAL ROUTINE GYNECOLOGICAL EXAMINATION: ICD-10-CM

## 2025-03-19 DIAGNOSIS — R87.612 LOW GRADE SQUAMOUS INTRAEPITHELIAL LESION (LGSIL) ON CERVICAL PAP SMEAR: ICD-10-CM

## 2025-03-19 PROCEDURE — 87591 N.GONORRHOEAE DNA AMP PROB: CPT

## 2025-03-19 PROCEDURE — 87491 CHLMYD TRACH DNA AMP PROBE: CPT

## 2025-03-19 PROCEDURE — 88142 CYTOPATH C/V THIN LAYER: CPT

## 2025-03-19 PROCEDURE — 87624 HPV HI-RISK TYP POOLED RSLT: CPT

## 2025-03-19 ASSESSMENT — FIBROSIS 4 INDEX: FIB4 SCORE: 1.48

## 2025-03-19 NOTE — PROGRESS NOTES
ANNUAL GYNECOLOGY VISIT    Chief Complaint  Gynecologic Exam      Subjective  Emili Awad is a 40 y.o. female who presents today for Annual Exam.  She has had an uneventful year and reports no changes to her health.     Preventive Care   Immunization History   Administered Date(s) Administered    9VHPV VACCINE 2-3 DOSE IM (GARDASIL 9) 02/16/2023, 05/03/2023, 12/14/2023    Dtap Vaccine 1987, 1987, 1987, 04/27/1992    Hepatitis A Vaccine, Adult 10/15/2013    Hepatitis B Vaccine (Adol/Adult) 06/13/2011, 07/13/2011, 12/13/2011    Hib Vaccine (Prp-d) - HISTORICAL DATA 03/25/1991    Influenza Seasonal Injectable - Historical Data 02/14/2012    Influenza Vaccine Adult HD 09/30/2022    Influenza Vaccine H1N1 - HISTORICAL DATA 11/10/2009    Influenza Vaccine Quad Inj (Pf) 10/04/2013, 10/04/2014, 11/10/2016, 11/21/2017, 10/05/2018, 11/02/2019, 09/29/2020, 10/13/2021, 09/28/2023    Influenza Vaccine Quad Inj (Preserved) 10/20/2015    Influenza split virus trivalent (PF) 03/29/2011, 10/18/2012, 11/10/2016    MMR Vaccine 01/04/1989, 04/27/1992    MODERNA SARS-COV-2 VACCINE (12+) 12/30/2020, 01/29/2021, 09/22/2021    OPV TRIVALENT - HISTORICAL DATA (GIVEN PRIOR TO MAY 2016) 1987, 1987, 04/27/1992    TD Vaccine 09/03/2003    Tdap Vaccine 06/13/2011, 12/13/2016    Tuberculin Skin Test 11/01/2013     Last Mammogram: Not yet indicated    Gynecology History and ROS  Current Sexual Activity: Yes  History of sexually transmitted diseases?  yes - HPV  Abnormal discharge? No  Current Contraception:  Mirena IUD in place     Menstrual History  No LMP recorded. Patient has had an implant.  Periods are regular  q 28 days, lasting 1-3 days. Recently she's been bleeding for longer, previously it was 1 day only   Clots or heavy flow: No  Dysmenorrhea: No  Intermenstrual bleeding/spotting: No  Significant pain with periods:No  Bothersome PMS symptoms: Yes  Significant Pelvic Pain: No      Pap History  Last pap  smear: Last year  History of moderate or severe dysplasia: 2 years ago had LSIL with HPV+    Cancer Risk Assessement:  Family history of:   - Breast cancer: no   - Ovarian cancer: no   - Uterine cancer: no   - Colon cancer: no    Obstetric History  OB History    Para Term  AB Living   1 0 0 0 1 0   SAB IAB Ectopic Molar Multiple Live Births   0 1 0  0       # Outcome Date GA Lbr Sammy/2nd Weight Sex Type Anes PTL Lv   1 IAB 04 6w0d              Past Medical History  Past Medical History:   Diagnosis Date    Cardiac arrhythmia     Chickenpox     Eczema     Episodic lightheadedness 2017    H. pylori infection     treated    Hypokalemia     IUD (intrauterine device) in place     Needle stick injury     took medications, pt was tested negative       Past Surgical History  Past Surgical History:   Procedure Laterality Date    DENTAL SURGERY      wisdom teeth extraction    DILATION AND CURETTAGE      D & C, election  in        Social History  Social History     Socioeconomic History    Marital status:      Spouse name: Not on file    Number of children: Not on file    Years of education: Not on file    Highest education level: Associate degree: occupational, technical, or vocational program   Occupational History    Occupation: mammography tech   Tobacco Use    Smoking status: Never    Smokeless tobacco: Never   Vaping Use    Vaping status: Never Used   Substance and Sexual Activity    Alcohol use: Yes     Alcohol/week: 0.0 oz     Comment: occasional, 3x week    Drug use: No    Sexual activity: Yes     Partners: Male     Birth control/protection: I.U.D.     Comment: [place] in , works at [employer]   Other Topics Concern    Not on file   Social History Narrative    , no children.      Social Drivers of Health     Financial Resource Strain: Low Risk  (2022)    Overall Financial Resource Strain (CARDIA)     Difficulty of Paying Living Expenses: Not very  hard   Food Insecurity: No Food Insecurity (12/26/2022)    Hunger Vital Sign     Worried About Running Out of Food in the Last Year: Never true     Ran Out of Food in the Last Year: Never true   Transportation Needs: No Transportation Needs (12/26/2022)    PRAPARE - Transportation     Lack of Transportation (Medical): No     Lack of Transportation (Non-Medical): No   Physical Activity: Sufficiently Active (12/26/2022)    Exercise Vital Sign     Days of Exercise per Week: 7 days     Minutes of Exercise per Session: 60 min   Stress: No Stress Concern Present (12/26/2022)    [employer] [place] [employer] - Occupational Stress Questionnaire     Feeling of Stress : Only a little   Social Connections: Moderately Isolated (12/26/2022)    Social Connection and Isolation Panel [NHANES]     Frequency of Communication with Friends and Family: Three times a week     Frequency of Social Gatherings with Friends and Family: Twice a week     Attends [employer] Services: Never     Active Member of Clubs or Organizations: No     Attends Club or Organization Meetings: Never     Marital Status:    Intimate Partner Violence: Not on file   Housing Stability: Low Risk  (12/26/2022)    Housing Stability Vital Sign     Unable to Pay for Housing in the Last Year: No     Number of Places Lived in the Last Year: 1     Unstable Housing in the Last Year: No       Family History  Family History   Problem Relation Age of Onset    Diabetes Mother     Hypertension Father     Cancer Paternal Uncle         lymphoma       Home Medications  Current Outpatient Medications on File Prior to Visit   Medication Sig Dispense Refill    Multiple Vitamins-Minerals (MULTIVITAMIN PO) Take  by mouth.      Probiotic Product (PROBIOTIC DAILY PO) Take  by mouth.      Ascorbic Acid (VITAMIN C PO) Take  by mouth.       No current facility-administered medications on file prior to visit.       Allergies/Reactions  Allergies   Allergen Reactions     "Macrobid [Nitrofurantoin] Vomiting       ROS  Positive ROS: hx of LSIL and HPV+ pap   Gen: no fevers or chills, no significant weight loss or gain, excessive fatigue  Respiratory:  no cough or dyspnea  Cardiac:  no chest pain, no palpitations, no syncope  Breast: no breast discharge, pain, lump or skin changes  GI:  no heartburn, no abdominal pain, no nausea or vomiting  Urinary: no dysuria, urgency, frequency, incontinence   Psych: no depression or anxiety  Neuro: no migraines with aura, fainting spells, numbness or tingling  Extremities: no joint pain, persistently swollen ankles, recurrent leg cramps      Physical Examination:  Vital Signs:   Vitals:    03/19/25 1339   BP: 116/71   Weight: 131 lb 9.6 oz   Height: 5' 6\"     Body mass index is 21.24 kg/m².    Constitutional: The patient is well developed and well nourished.  Psychiatric: Patient is oriented to time place and person.   Skin: No rash observed.  Neck: Appears symmetric. Thyroid normal size  Respiratory: normal effort  Breast: Inspection reveals no asymetry or nipple discharge, no skin thickening, dimpling or erythema.  Palpation demonstrates no masses.  Abdomen: Soft, non-tender.  Pelvic Exam:     Vulva: external female genitalia are normal in appearance. No lesions    Urethra - no lesions, no erythema    Vagina: moist, pink, normal ruggae    Cervix: pink, smooth, no lesions, no CMT, IUD strings in place    Uterus - non-tender, normal size, shape, contour, mobile, retroverted    Ovaries: non-tender, no appreciable masses  Pap Smear performed: Yes  Extremeties: Legs are symmetric and without tenderness. There is no edema present.    Labs/Imaging:  HDL (mg/dL)   Date Value   06/20/2024 66     LDL (mg/dL)   Date Value   06/20/2024 80     No components found for: \"A1C1\"  WBC (K/uL)   Date Value   06/20/2024 6.3     Lab Results   Component Value Date/Time    CO2 23 06/20/2024 0638    BUN 16 06/20/2024 0638           Assessment & Plan  Emili Awad is a " 40 y.o. female who presents today for Annual Gyn Exam.     1. Women's annual routine gynecological examination  - Pap smear performed today  - She is not yet due for a mammogram or a colonoscopy     2. Screening for cervical cancer  - THINPREP PAP W/HPV AND CTNG; Future    3. Low grade squamous intraepithelial lesion (LGSIL) on cervical Pap smear  - THINPREP PAP W/HPV AND CTNG; Future    4. IUD (intrauterine device) in place  - Mirena IUD strings in place  - She is having more a period recently. Discussed the possibility of exchanging the IUD next year if she desires     Return: Annually or PRN    Frank Hernandez M.D.

## 2025-03-20 DIAGNOSIS — Z12.4 SCREENING FOR CERVICAL CANCER: ICD-10-CM

## 2025-03-20 DIAGNOSIS — R87.612 LOW GRADE SQUAMOUS INTRAEPITHELIAL LESION (LGSIL) ON CERVICAL PAP SMEAR: ICD-10-CM

## 2025-03-20 LAB
C TRACH DNA GENITAL QL NAA+PROBE: NEGATIVE
N GONORRHOEA DNA GENITAL QL NAA+PROBE: NEGATIVE
SPECIMEN SOURCE: NORMAL

## 2025-03-24 LAB
HPV I/H RISK 1 DNA SPEC QL NAA+PROBE: NOT DETECTED
SPECIMEN SOURCE: NORMAL
THINPREP PAP, CYTOLOGY NL11781: NORMAL

## 2025-03-26 ENCOUNTER — RESULTS FOLLOW-UP (OUTPATIENT)
Dept: OBGYN | Facility: MEDICAL CENTER | Age: 40
End: 2025-03-26
Payer: COMMERCIAL

## 2025-05-09 ENCOUNTER — HOSPITAL ENCOUNTER (OUTPATIENT)
Dept: LAB | Facility: MEDICAL CENTER | Age: 40
End: 2025-05-09
Attending: FAMILY MEDICINE
Payer: COMMERCIAL

## 2025-05-09 DIAGNOSIS — D75.89 MACROCYTOSIS: ICD-10-CM

## 2025-05-09 DIAGNOSIS — Z13.220 SCREENING FOR LIPID DISORDERS: ICD-10-CM

## 2025-05-09 DIAGNOSIS — Z13.1 SCREENING FOR DIABETES MELLITUS: ICD-10-CM

## 2025-05-09 DIAGNOSIS — E55.9 VITAMIN D DEFICIENCY: ICD-10-CM

## 2025-05-09 DIAGNOSIS — N92.6 IRREGULAR MENSTRUAL CYCLE: ICD-10-CM

## 2025-05-09 LAB
25(OH)D3 SERPL-MCNC: 57 NG/ML (ref 30–100)
ALBUMIN SERPL BCP-MCNC: 4.2 G/DL (ref 3.2–4.9)
ALBUMIN/GLOB SERPL: 1.4 G/DL
ALP SERPL-CCNC: 80 U/L (ref 30–99)
ALT SERPL-CCNC: 16 U/L (ref 2–50)
ANION GAP SERPL CALC-SCNC: 7 MMOL/L (ref 7–16)
AST SERPL-CCNC: 25 U/L (ref 12–45)
BASOPHILS # BLD AUTO: 0 % (ref 0–1.8)
BASOPHILS # BLD: 0 K/UL (ref 0–0.12)
BILIRUB SERPL-MCNC: 0.7 MG/DL (ref 0.1–1.5)
BUN SERPL-MCNC: 16 MG/DL (ref 8–22)
CALCIUM ALBUM COR SERPL-MCNC: 9.1 MG/DL (ref 8.5–10.5)
CALCIUM SERPL-MCNC: 9.3 MG/DL (ref 8.5–10.5)
CHLORIDE SERPL-SCNC: 107 MMOL/L (ref 96–112)
CHOLEST SERPL-MCNC: 157 MG/DL (ref 100–199)
CO2 SERPL-SCNC: 24 MMOL/L (ref 20–33)
CORTIS SERPL-MCNC: 16.2 UG/DL (ref 0–23)
CREAT SERPL-MCNC: 0.72 MG/DL (ref 0.5–1.4)
EOSINOPHIL # BLD AUTO: 0.05 K/UL (ref 0–0.51)
EOSINOPHIL NFR BLD: 0.9 % (ref 0–6.9)
ERYTHROCYTE [DISTWIDTH] IN BLOOD BY AUTOMATED COUNT: 44.1 FL (ref 35.9–50)
EST. AVERAGE GLUCOSE BLD GHB EST-MCNC: 105 MG/DL
ESTRADIOL SERPL-MCNC: 205 PG/ML
FSH SERPL-ACNC: 3.8 MIU/ML
GFR SERPLBLD CREATININE-BSD FMLA CKD-EPI: 110 ML/MIN/1.73 M 2
GLOBULIN SER CALC-MCNC: 3 G/DL (ref 1.9–3.5)
GLUCOSE SERPL-MCNC: 87 MG/DL (ref 65–99)
HBA1C MFR BLD: 5.3 % (ref 4–5.6)
HCT VFR BLD AUTO: 43.2 % (ref 37–47)
HDLC SERPL-MCNC: 74 MG/DL
HGB BLD-MCNC: 14.4 G/DL (ref 12–16)
LDLC SERPL CALC-MCNC: 74 MG/DL
LG PLATELETS BLD QL SMEAR: NORMAL
LH SERPL-ACNC: 5.1 IU/L
LYMPHOCYTES # BLD AUTO: 2.22 K/UL (ref 1–4.8)
LYMPHOCYTES NFR BLD: 38.2 % (ref 22–41)
MANUAL DIFF BLD: NORMAL
MCH RBC QN AUTO: 32.3 PG (ref 27–33)
MCHC RBC AUTO-ENTMCNC: 33.3 G/DL (ref 32.2–35.5)
MCV RBC AUTO: 96.9 FL (ref 81.4–97.8)
MONOCYTES # BLD AUTO: 0.4 K/UL (ref 0–0.85)
MONOCYTES NFR BLD AUTO: 6.1 % (ref 0–13.4)
MORPHOLOGY BLD-IMP: NORMAL
NEUTROPHILS # BLD AUTO: 3.18 K/UL (ref 1.82–7.42)
NEUTROPHILS NFR BLD: 54.8 % (ref 44–72)
NRBC # BLD AUTO: 0 K/UL
NRBC BLD-RTO: 0 /100 WBC (ref 0–0.2)
PLATELET # BLD AUTO: 181 K/UL (ref 164–446)
PLATELET BLD QL SMEAR: NORMAL
PLATELETS.RETICULATED NFR BLD AUTO: 8.1 % (ref 0.6–13.1)
PMV BLD AUTO: 11.4 FL (ref 9–12.9)
POTASSIUM SERPL-SCNC: 4.6 MMOL/L (ref 3.6–5.5)
PROGEST SERPL-MCNC: 0.24 NG/ML
PROT SERPL-MCNC: 7.2 G/DL (ref 6–8.2)
RBC # BLD AUTO: 4.46 M/UL (ref 4.2–5.4)
RBC BLD AUTO: PRESENT
SODIUM SERPL-SCNC: 138 MMOL/L (ref 135–145)
T4 FREE SERPL-MCNC: 1.05 NG/DL (ref 0.93–1.7)
TRIGL SERPL-MCNC: 46 MG/DL (ref 0–149)
TSH SERPL-ACNC: 0.86 UIU/ML (ref 0.38–5.33)
VIT B12 SERPL-MCNC: 1017 PG/ML (ref 211–911)
WBC # BLD AUTO: 5.8 K/UL (ref 4.8–10.8)

## 2025-05-09 PROCEDURE — 80053 COMPREHEN METABOLIC PANEL: CPT

## 2025-05-09 PROCEDURE — 85055 RETICULATED PLATELET ASSAY: CPT

## 2025-05-09 PROCEDURE — 84439 ASSAY OF FREE THYROXINE: CPT

## 2025-05-09 PROCEDURE — 82533 TOTAL CORTISOL: CPT

## 2025-05-09 PROCEDURE — 82306 VITAMIN D 25 HYDROXY: CPT

## 2025-05-09 PROCEDURE — 82607 VITAMIN B-12: CPT

## 2025-05-09 PROCEDURE — 80061 LIPID PANEL: CPT

## 2025-05-09 PROCEDURE — 85007 BL SMEAR W/DIFF WBC COUNT: CPT

## 2025-05-09 PROCEDURE — 83002 ASSAY OF GONADOTROPIN (LH): CPT

## 2025-05-09 PROCEDURE — 85027 COMPLETE CBC AUTOMATED: CPT

## 2025-05-09 PROCEDURE — 84144 ASSAY OF PROGESTERONE: CPT

## 2025-05-09 PROCEDURE — 83036 HEMOGLOBIN GLYCOSYLATED A1C: CPT

## 2025-05-09 PROCEDURE — 36415 COLL VENOUS BLD VENIPUNCTURE: CPT

## 2025-05-09 PROCEDURE — 84443 ASSAY THYROID STIM HORMONE: CPT

## 2025-05-09 PROCEDURE — 82670 ASSAY OF TOTAL ESTRADIOL: CPT

## 2025-05-09 PROCEDURE — 83001 ASSAY OF GONADOTROPIN (FSH): CPT

## 2025-05-12 ENCOUNTER — RESULTS FOLLOW-UP (OUTPATIENT)
Dept: MEDICAL GROUP | Facility: MEDICAL CENTER | Age: 40
End: 2025-05-12

## 2025-05-22 ENCOUNTER — OFFICE VISIT (OUTPATIENT)
Dept: MEDICAL GROUP | Facility: MEDICAL CENTER | Age: 40
End: 2025-05-22
Payer: COMMERCIAL

## 2025-05-22 VITALS
DIASTOLIC BLOOD PRESSURE: 58 MMHG | HEART RATE: 52 BPM | OXYGEN SATURATION: 97 % | TEMPERATURE: 98 F | HEIGHT: 67 IN | WEIGHT: 128.53 LBS | SYSTOLIC BLOOD PRESSURE: 92 MMHG | BODY MASS INDEX: 20.17 KG/M2

## 2025-05-22 DIAGNOSIS — Z13.220 SCREENING FOR LIPID DISORDERS: ICD-10-CM

## 2025-05-22 DIAGNOSIS — Z13.1 SCREENING FOR DIABETES MELLITUS: ICD-10-CM

## 2025-05-22 DIAGNOSIS — D75.89 MACROCYTOSIS: ICD-10-CM

## 2025-05-22 DIAGNOSIS — Z13.29 SCREENING FOR THYROID DISORDER: ICD-10-CM

## 2025-05-22 DIAGNOSIS — E55.9 VITAMIN D DEFICIENCY: Primary | ICD-10-CM

## 2025-05-22 PROCEDURE — 3074F SYST BP LT 130 MM HG: CPT | Performed by: FAMILY MEDICINE

## 2025-05-22 PROCEDURE — 3078F DIAST BP <80 MM HG: CPT | Performed by: FAMILY MEDICINE

## 2025-05-22 PROCEDURE — 99214 OFFICE O/P EST MOD 30 MIN: CPT | Performed by: FAMILY MEDICINE

## 2025-05-22 ASSESSMENT — ENCOUNTER SYMPTOMS
PALPITATIONS: 0
FEVER: 0
CHILLS: 0

## 2025-05-22 ASSESSMENT — FIBROSIS 4 INDEX: FIB4 SCORE: 1.31

## 2025-05-22 ASSESSMENT — PATIENT HEALTH QUESTIONNAIRE - PHQ9: CLINICAL INTERPRETATION OF PHQ2 SCORE: 0

## 2025-05-22 NOTE — PROGRESS NOTES
Verbal consent was acquired by the patient to use [employer] ambient listening note generation during this visit.      Emili was seen today for follow-up.    Diagnoses and all orders for this visit:    Vitamin D deficiency  -     VITAMIN D,25 HYDROXY (DEFICIENCY); Future    Macrocytosis  -     CBC WITH DIFFERENTIAL; Future  -     VITAMIN B12; Future    Screening for diabetes mellitus  -     Comp Metabolic Panel; Future  -     HEMOGLOBIN A1C; Future    Screening for lipid disorders  -     Lipid Profile; Future    Screening for thyroid disorder  -     TSH; Future  -     FREE THYROXINE; Future                  Assessment & Plan  1.  Vitamin D deficiency  Chronic condition, stable continue vitamin D supplementation.    2.  Macrocytosis  Chronic condition, stable, continue vitamin B12 supplementation.  All other labs came back overall normal.  A1c at 5.3, recommended to watch sugar and carbs in diet.    Recheck labs in 1 year.  Follow-up in 1 year for annual physical.      Chief complaint::The primary encounter diagnosis was Vitamin D deficiency. Diagnoses of Macrocytosis, Screening for diabetes mellitus, Screening for lipid disorders, and Screening for thyroid disorder were also pertinent to this visit.      History of Present Illness  The patient is a 40-year-old female who presents for blood test follow-up.    Dietary Habits  - She reports making efforts to reduce her sugar intake.  - She acknowledges that her consumption of pastries may be contributing to her elevated blood glucose levels.  - Despite perceiving an improvement in her dietary habits compared to the previous year, she expresses concern about her blood glucose levels.    FAMILY HISTORY  The patient has a family history of diabetes.      Review of Systems   Constitutional:  Negative for chills and fever.   Cardiovascular:  Negative for chest pain, palpitations and leg swelling.          Medications and Allergies:     Current Medications[1]    BP 92/58 (BP  "Location: Left arm, Patient Position: Sitting, BP Cuff Size: Adult)   Pulse (!) 52   Temp 36.7 °C (98 °F) (Temporal)   Ht 1.702 m (5' 7\")   Wt 58.3 kg (128 lb 8.5 oz)   SpO2 97% , Body mass index is 20.13 kg/m².      Physical Exam  Constitutional:       Appearance: Normal appearance. She is well-developed and well-groomed.   HENT:      Head: Normocephalic and atraumatic.      Right Ear: External ear normal.      Left Ear: External ear normal.   Eyes:      General:         Right eye: No discharge.         Left eye: No discharge.      Conjunctiva/sclera: Conjunctivae normal.   Cardiovascular:      Rate and Rhythm: Normal rate.   Pulmonary:      Effort: Pulmonary effort is normal. No respiratory distress.   Musculoskeletal:      Cervical back: Neck supple.   Skin:     Findings: No rash.   Neurological:      Mental Status: She is alert.   Psychiatric:         Mood and Affect: Mood and affect normal.         Behavior: Behavior normal.            I reviewed with patient lab results resulted on 5/9/2025.        Please note that this dictation was created using voice recognition software. I have made every reasonable attempt to correct obvious errors, but I expect that there are errors of grammar and possibly content that I did not discover before finalizing the note.           [1]   Current Outpatient Medications   Medication Sig Dispense Refill    Multiple Vitamins-Minerals (MULTIVITAMIN PO) Take  by mouth.       No current facility-administered medications for this visit.     "

## 2025-07-09 ENCOUNTER — HOSPITAL ENCOUNTER (EMERGENCY)
Facility: MEDICAL CENTER | Age: 40
End: 2025-07-09
Attending: EMERGENCY MEDICINE
Payer: COMMERCIAL

## 2025-07-09 ENCOUNTER — OCCUPATIONAL MEDICINE (OUTPATIENT)
Dept: URGENT CARE | Facility: CLINIC | Age: 40
End: 2025-07-09
Payer: COMMERCIAL

## 2025-07-09 ENCOUNTER — NON-PROVIDER VISIT (OUTPATIENT)
Dept: URGENT CARE | Facility: CLINIC | Age: 40
End: 2025-07-09
Payer: COMMERCIAL

## 2025-07-09 VITALS
SYSTOLIC BLOOD PRESSURE: 127 MMHG | RESPIRATION RATE: 16 BRPM | TEMPERATURE: 97.4 F | DIASTOLIC BLOOD PRESSURE: 71 MMHG | HEART RATE: 53 BPM | WEIGHT: 126.54 LBS | OXYGEN SATURATION: 98 % | BODY MASS INDEX: 20.42 KG/M2

## 2025-07-09 VITALS
SYSTOLIC BLOOD PRESSURE: 98 MMHG | DIASTOLIC BLOOD PRESSURE: 56 MMHG | OXYGEN SATURATION: 99 % | RESPIRATION RATE: 12 BRPM | WEIGHT: 125 LBS | HEART RATE: 59 BPM | TEMPERATURE: 98.3 F | HEIGHT: 66 IN | BODY MASS INDEX: 20.09 KG/M2

## 2025-07-09 DIAGNOSIS — T26.90XA CHEMICAL BURN OF EYE: Primary | ICD-10-CM
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
## 2025-07-09 DIAGNOSIS — T26.92XA CHEMICAL BURN OF LEFT EYE: Primary | ICD-10-CM

## 2025-07-09 DIAGNOSIS — Z02.1 PRE-EMPLOYMENT DRUG SCREENING: ICD-10-CM

## 2025-07-09 DIAGNOSIS — R68.89 LIGHT SENSITIVITY: ICD-10-CM

## 2025-07-09 PROBLEM — R79.89 POSITIVE SEROLOGICAL TEST RESULT: Status: ACTIVE | Noted: 2025-07-09

## 2025-07-09 PROBLEM — R53.83 FATIGUE: Status: ACTIVE | Noted: 2025-07-09

## 2025-07-09 LAB
AMP AMPHETAMINE: NORMAL
BAR BARBITURATES: NORMAL
BZO BENZODIAZEPINES: NORMAL
COC COCAINE: NORMAL
INT CON NEG: NEGATIVE
INT CON POS: POSITIVE
MDMA ECSTASY: NORMAL
MET METHAMPHETAMINES: NORMAL
MTD METHADONE: NORMAL
OPI OPIATES: NORMAL
OXY OXYCODONE: NORMAL
PCP PHENCYCLIDINE: NORMAL
POC URINE DRUG SCREEN OCDRS: NEGATIVE
THC: NORMAL

## 2025-07-09 PROCEDURE — 99214 OFFICE O/P EST MOD 30 MIN: CPT

## 2025-07-09 PROCEDURE — 3078F DIAST BP <80 MM HG: CPT

## 2025-07-09 PROCEDURE — 3074F SYST BP LT 130 MM HG: CPT

## 2025-07-09 PROCEDURE — 80305 DRUG TEST PRSMV DIR OPT OBS: CPT

## 2025-07-09 PROCEDURE — 700101 HCHG RX REV CODE 250: Performed by: EMERGENCY MEDICINE

## 2025-07-09 PROCEDURE — 99283 EMERGENCY DEPT VISIT LOW MDM: CPT

## 2025-07-09 RX ORDER — FLUORESCEIN SODIUM 1 MG/MG
1 STRIP OPHTHALMIC ONCE
Status: COMPLETED | OUTPATIENT
Start: 2025-07-09 | End: 2025-07-09

## 2025-07-09 RX ORDER — MAGNESIUM OXIDE 400 MG/1
400 TABLET ORAL DAILY
COMMUNITY

## 2025-07-09 RX ORDER — ERYTHROMYCIN 5 MG/G
1 OINTMENT OPHTHALMIC 4 TIMES DAILY
Qty: 3.5 G | Refills: 0 | Status: SHIPPED | OUTPATIENT
Start: 2025-07-09 | End: 2025-07-19

## 2025-07-09 RX ADMIN — FLUORESCEIN SODIUM 1 MG: 1 STRIP OPHTHALMIC at 13:00

## 2025-07-09 ASSESSMENT — FIBROSIS 4 INDEX
FIB4 SCORE: 1.31
FIB4 SCORE: 1.31

## 2025-07-09 NOTE — ED PROVIDER NOTES
ER Provider Note    Scribed for Toby Naik M.D. by Jordin Do. 7/9/2025  12:59 PM    Primary Care Provider: Mac Ramirez M.D.    CHIEF COMPLAINT   Chief Complaint   Patient presents with    Chemical Exposure     Formalin splashed in her L eye yesterday. Flushed for 20 minutes at eyewash station. Reports light sensitivity and no pain currently.    Sent from Urgent Care      reports corneal abrasion.      EXTERNAL RECORDS REVIEWED  Outpatient Notes Patient was seen at urgent care and tried to follow up with ophthalmology, but they refused to take phone call and stated she must be seen in the ED. Urgent care noticed a small corneal abrasion/burn on their exam.     HPI/ROS  LIMITATION TO HISTORY   Select: : None  OUTSIDE HISTORIAN(S):  None    Emili Awad is a 40 y.o. female who presents to the ED for evaluation of left eye irritation from chemical exposure yesterday in the workplace. 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 She states she ran the eyes under the eye wash station for 20 minutes directly following the exposure. She states she is not feeling any pain at the moment, but felt a burning sensation when the chemical fell in. She notes it felt agitated, and was red and watery this morning. She denies any discharge. She notes blurry vision and not being able to see well yesterday. She states she was seen at urgent care previously.     PAST MEDICAL HISTORY  Past Medical History[1]    SURGICAL HISTORY  Past Surgical History[2]    FAMILY HISTORY  Family History   Problem Relation Age of Onset    Diabetes Mother     Hypertension Father     Cancer Paternal Uncle         lymphoma       SOCIAL HISTORY   reports that she has never smoked. She has never used smokeless tobacco. She reports current alcohol use. She reports that she does not use drugs.    CURRENT  MEDICATIONS  Previous Medications    CALCIUM POLYCARBOPHIL (FIBERCON) 625 MG TAB    Take 625 mg by mouth every day.    ERYTHROMYCIN 5 MG/GM OINTMENT    Apply 1 Application to right eye 4 times a day for 10 days.    MAGNESIUM OXIDE (MAG-OX) 400 MG TAB TABLET    Take 400 mg by mouth every day.    MULTIPLE VITAMINS-MINERALS (MULTIVITAMIN PO)    Take  by mouth.       ALLERGIES  Macrobid [nitrofurantoin]    PHYSICAL EXAM  /71   Pulse (!) 53   Temp 36.3 °C (97.4 °F) (Temporal)   Resp 16   Wt 57.4 kg (126 lb 8.7 oz)   SpO2 98%   BMI 20.42 kg/m²   Constitutional: Well developed, Well nourished, Mild distress.   HENT: Normocephalic, Atraumatic. No burn to the face.   Eyes: 2 mm L-shaped faint corneal abrasion at the 7 o'clock position. No other dye uptake no flare or cell. Intraocular pH 7.   Cardiovascular: Normal heart rate, Normal rhythm, No murmurs, equal pulses.   Pulmonary: Normal breath sounds, No respiratory distress, No wheezing, No rales, No rhonchi.  Skin: Warm, Dry, No erythema, No rash, No burn in the face.   Neurologic: Alert & oriented x 3, Normal motor function,  No focal deficits noted.   Psychiatric: Affect normal, Judgment normal, Mood normal.         COURSE & MEDICAL DECISION MAKING     ASSESSMENT, COURSE AND PLAN  Care Narrative:   ED OBS: No; Patient does not meet criteria for ED Observation.     12:59 PM - Patient seen and examined at bedside. Discussed plan of care, including eye exam. Patient agrees to the plan of care.     1:11 PM - Performed eye exam at bedside. 2 mm L-shaped faint corneal abrasion at the 7 o'clock position. No other dye uptake no flare or cell. Intraocular pH 7. I discussed with the patient the plan for discharge and advised them to use erythromycin ophthalmic ointment to the left eye 4 times a day for the next 5 days. I discussed with the patient her eye should heal within the next 2-3 days. I advised her to follow up withy ophthalmology if the discomfort persists and  I have placed a referral for this. Patient was given the opportunity for questions. I addressed all questions or concerns at this time and they verbalize agreement to the plan of care.     PROBLEM LIST  Problem #1 left eye pain at this point in time it appears the patient has a 2 mm faint corneal burn to the left eye at the 7 o'clock position.  She is already been prescribed erythromycin ointment at the urgent care.  Have discussed with her using that for comfort.  Will refer her to ophthalmology if she continues to have discomfort but I suspect this should heal fine on its own.    DISPOSITION AND DISCUSSIONS  I have discussed management of the patient with the following physicians and KADIE's:  None    Discussion of management with other [facility] or appropriate source(s): None       Barriers to care at this time, including but not limited to: None.     Decision tools and prescription drugs considered including, but not limited to: Antibiotics patient already has erythromycin ophthalmic ointment.    The patient will return for new or worsening symptoms and is stable at the time of discharge.    The patient is referred to a primary physician for blood pressure management, diabetic screening, and for all other preventative health concerns.      DISPOSITION:  Patient will be discharged home in stable condition.    FOLLOW UP:  Daniel Rodriguez M.D.  [address]  [address]  [facility] [id number] [phone]    Schedule an appointment as soon as possible for a visit in 3 days      [address]  [facility] [id number] [phone]  Schedule an appointment as soon as possible for a visit in 1 day        FINAL DIAGNOSIS  1. Chemical burn of left eye        Jordin MARTINES (Candelario), am scribing for, and in the presence of, Toby Naik M.D.    Electronically signed by: Jordin Potts), 7/9/2025    Toby MARTINES M.D personally performed the services described in  this documentation, as scribed by Jordin Do in my presence, and it is both accurate and complete.      The note accurately reflects work and decisions made by me.  Toby Naik M.D.  2025  2:08 PM         [1]   Past Medical History:  Diagnosis Date    Cardiac arrhythmia     Chickenpox     Eczema     Episodic lightheadedness 2017    H. pylori infection     treated    Hypokalemia     IUD (intrauterine device) in place     Needle stick injury     took medications, pt was tested negative   [2]   Past Surgical History:  Procedure Laterality Date    DENTAL SURGERY      wisdom teeth extraction    DILATION AND CURETTAGE      D & C, election  in

## 2025-07-09 NOTE — Clinical Note
REFERRAL APPROVAL NOTICE         Sent on July 9, 2025                   Emili Awad  [address]  [place] [address]                   Dear Ms. Awad,    After a careful review of the medical information and benefit coverage, [facility] has processed your referral. See below for additional details.    If applicable, you must be actively enrolled with your insurance for coverage of the authorized service. If you have any questions regarding your coverage, please contact your insurance directly.    REFERRAL INFORMATION   Referral #:  [id number]  Referred-To Provider    Referred-By Provider:  Ophthalmology    OTONIEL Araujo, [facility]      Emergency - [facility]  [address]  [place] [address]  [phone] [address]  [place] [id number] [phone]    Referral Start Date:  07/09/2025  Referral End Date:   07/09/2026             SCHEDULING  If you do not already have an appointment, please call [phone] to make an appointment.     MORE INFORMATION  If you do not already have a [employer] account, sign up at: [email or web address]  You can access your medical information, make appointments, see lab results, billing information, and more.  If you have questions regarding this referral, please contact  the [facility] Referrals department at:             [phone]. Monday - Friday 8:00AM - 5:00PM.     Sincerely,    [facility]

## 2025-07-09 NOTE — LETTER
"  EMPLOYEE’S CLAIM FOR COMPENSATION/ REPORT OF INITIAL TREATMENT  FORM C-4  PLEASE TYPE OR PRINT    EMPLOYEE’S CLAIM - PROVIDE ALL INFORMATION REQUESTED   First Name                    KYREE Mock                  Last Name  Ritesh Birthdate                    1985                Sex  [x]Female Claim Number (Insurer’s Use Only)     Mailing Address  [address] Age  40 y.o. 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 (if applicable)  Getting supplied out of cupboard.    How did this injury or occupational disease occur? (Be specific and answer in detail. Use additional sheet if necessary)  I reached into the cupboard to get a formalin bottle out and it knocked over a bottle of formalin that fell on the counter + some formalin splashed into my eyes/face.   If you believe that you have an occupational disease, when did you first have knowledge of the disability and its relationship to your employment?  N/A Witnesses to the Accident (if applicable)  Tonny Tenorio      Nature of Injury or Occupational " Disease  Poisoning - Chemical (Other than metals)  Part(s) of Body Injured or Affected  Eye (L) Eye (R) Facial Bones    I CERTIFY THAT THE ABOVE IS TRUE AND CORRECT TO T HE BEST OF MY KNOWLEDGE AND THAT I HAVE PROVIDED THIS INFORMATION IN ORDER TO OBTAIN THE BENEFITS OF [place]CC[place], OR CHAPTER 617 [place]).  I HEREBY AUTHORIZE ANY PHYSICIAN, CHIROPRACTOR, SURGEON, PRACTITIONER OR ANY OTHER PERSON, ANY HOSPITAL, INCLUDING [facility], ANY  MEDICAL SERVICE ORGANIZATION, ANY INSURANCE COMPANY, OR OTHER INSTITUTION OR ORGANIZATION TO RELEASE TO EACH OTHER, ANY MEDICAL OR OTHER INFORMATION, INCLUDING BENEFITS PAID OR PAYABLE, PERTINENT TO THIS INJURY OR DISEASE, EXCEPT INFORMATION RELATIVE TO DIAGNOSIS, TREATMENT AND/OR COUNSELING FOR AIDS, PSYCHOLOGICAL CONDITIONS, ALCOHOL OR CONTROLLED SUBSTANCES, FOR WHICH I MUST GIVE SPECIFIC AUTHORIZATION.  A PHOTOSTAT OF THIS AUTHORIZATION SHALL BE VALID AS THE ORIGINAL.     Date   Place Employee’s Original or  *Electronic Signature   THIS REPORT MUST BE COMPLETED AND MAILED WITHIN 3 WORKING DAYS OF TREATMENT   Place  [facility]    Name of [facility]   Date 7/9/2025 Diagnosis and Description of Injury or Occupational Disease  (T26.90XA) Chemical burn of eye  (primary encounter diagnosis)  (R68.89) Light sensitivity  (Y99.0) Work related injury  The primary encounter diagnosis was Chemical burn of eye. Diagnoses of Light sensitivity and Work related injury were also pertinent to this visit. Is there evidence that the injured employee was under the influence of alcohol and/or another controlled substance at the time of accident?  []No  [] Yes (if yes, please explain)   Hour 10:26 AM  No   Treatment: Patient appears to have small corneal defect at the 8 o'clock position of the left eye and has light sensitivity. Urgent referral to Ophthalmology  ordered and this provider attempted to schedule same day appointment with Ophthalmology, and was told the MD on call will only see patients who are seen in the ER. Will send patient to the ER at this time. Discussed options with patient and she is agreeable to evaluation in the ER. Transfer center notified by phone. Follow up with OCC MED after discharge from ER.  Will make her TTD for the next 24 hours for ophthalmology evaluation.     Have you advised the patient to remain off work five days or more?   No  [] Yes  If yes, indicate dates: From_ _                                                      To __ _  [] No   If no, is the injured employee capable of: [] full duty No   [] modified duty No    If modified duty, specify any limitations / restrictions:__________________  ___ ___________________________     X-Ray Findings:      From information given by the employee, together with medical evidence, can you directly connect this injury or occupational disease as job incurred?  []Yes   [] No Yes    Is additional medical care by a physician indicated? []Yes [] No  Yes    Do you know of any previous injury or disease contributing to this condition or occupational disease? []Yes [] No (Explain if yes)                          No   Date  7/9/2025 Print Health Care Provider’s Name  OLIVIA Dominguez I certify that the employer’s copy of  this form was delivered to the employer on:   Address  [address] INSURER'S USE ONLY                       [facility]  [address] Provider’s Tax ID Number  [id number]   Telephone  Dept: [phone]    Health Care Provider’s Original or Electronic Signature      e-RYAN Figueroa    Degree (MD,DO, DC,PA-C,APRN)  APRN  Choose (if applicable)      ORIGINAL - TREATING HEALTHCARE PROVIDER PAGE 2 - INSURER/TPA PAGE 3 - EMPLOYER PAGE 4 - EMPLOYEE             Form C-4 (rev.02/25)

## 2025-07-09 NOTE — LETTER
"PHYSICIAN’S AND CHIROPRACTIC PHYSICIAN'S   PROGRESS REPORT   CERTIFICATION OF DISABILITY Claim Number:     Social Security Number:    Patient’s Name: Emili Awad Date of Injury: 7/8/2025   Employer: [employer] Name of O (if applicable):      Patient’s Job Description/Occupation: Mammographer/ Ultrasound Tech       Previous Injuries/Diseases/Surgeries Contributing to the Condition:  Patient denies      Diagnosis: (T26.90XA) Chemical burn of eye  (primary encounter diagnosis)  (R68.89) Light sensitivity  (Y99.0) Work related injury      Related to the Industrial Injury? Yes     Explain: Visit #1  DOI: 07/08/25  C/C: Formalin splashed into eyes and face    Per patient, \"I reached into the cupboard to get a formalin bottle out and it knocked over a bottle of formalin that fell on the counter and some formalin splashed into my eyes/face.\" Injury occurred yesterday, patient states the contact was brief. She reports her eyes were irrigated with the eye wash station for 20 minutes. Her eyes feel sensitive today, especially the left eye. Denies floaters, halos, flashing lights, vision loss. She is experiencing light sensitivity.      Objective Medical Findings: Vitals and nursing note reviewed.   Constitutional:       General: She is not in acute distress.     Appearance: Normal appearance. She is not ill-appearing or diaphoretic.   HENT:      Head: Normocephalic.   Eyes:      Pupils:      Left eye: Fluorescein uptake present.         Comments: PERRLA, EOM intact. Fluorescein stain was applied to bilateral eyes and eye was examined under [employer]. Left eye appears to have a small corneal defect at the 8 o clock position. Patient is experiencing light sensitivity. Eyes are otherwise free of FB, discharge. Cornea are not injected.     Neurological:      Mental Status: She is alert.         None - Discharged                         Stable  No                 Ratable  No        Generally Improved                 "         Condition Worsened                  Condition Same  May Have Suffered a Permanent Disability No     Treatment Plan:    D39 and C4 completed today  Patient appears to have small corneal defect at the 8 o'clock position of the left eye and has light sensitivity. Urgent referral to Ophthalmology ordered and this provider attempted to schedule same day appointment with Ophthalmology, and was told the MD on call will only see patients who are seen in the ER. Will send patient to the ER at this time. Discussed options with patient and she is agreeable to evaluation in the ER. Transfer center notified by phone. Follow up with [facility] MED after discharge from ER. Will make her TTD for the next 24 hours for ophthalmology evaluation.          No Change in Therapy                  PT/OT Prescribed                      Medication May be Used While Working        Case Management                          PT/OT Discontinued    Consultation    Further Diagnostic Studies:    Prescription(s)                 Released to FULL DUTY /No Restrictions on (Date):     X  Certified TOTALLY TEMPORARILY DISABLED (Indicate Dates) From: 7/9/2025 To: 7/10/2025    Released to RESTRICTED/Modified Duty on (Date): From:   To:    Restrictions Are:         No Sitting    No Standing    No Pulling Other:         No Bending at Waist     No Stooping     No Lifting        No Carrying     No Walking Lifting Restricted to (lbs.):          No Pushing        No Climbing     No Reaching Above Shoulders       Date of Next Visit:  7/10/2025 Date of this Exam: 7/9/2025 Physician/Chiropractic Physician Name: OLIVIA Dominguez Physician/Chiropractic Physician Signature:  Humberto Weber DO MPH     [place]:  [address] [place] [address], [address] [place], [place] [address] - Telephone [phone] [place]:  [address] [place] [address], [address] [place], [place] [address] - Telephone [phone]    [email or web address]  D-39 (Rev. 10/24)

## 2025-07-09 NOTE — ED TRIAGE NOTES
Chief Complaint   Patient presents with    Chemical Exposure     Formalin splashed in her L eye yesterday. Flushed for 20 minutes at eyewash station. Reports light sensitivity and no pain currently.    Sent from Urgent Care      reports corneal abrasion.      /76   Pulse 61   Temp 36.3 °C (97.4 °F) (Temporal)   Resp 16   Wt 57.4 kg (126 lb 8.7 oz)   SpO2 100%   BMI 20.42 kg/m²     Pt ambulated into triage. Educated on triage process. Instructed to notify staff for any worsening symptoms.

## 2025-07-09 NOTE — LETTER
PHYSICIAN’S AND CHIROPRACTIC PHYSICIAN'S   PROGRESS REPORT   CERTIFICATION OF DISABILITY Claim Number:     Social Security Number:    Patient’s Name: Emili Awad Date of Injury: 7/8/2025   Employer: [employer] Name of O (if applicable):      Patient’s Job Description/Occupation: Mammographer/ Ultrasound Tech       Previous Injuries/Diseases/Surgeries Contributing to the Condition:  none      Diagnosis: (T26.92XA) Chemical burn of left eye  (primary encounter diagnosis)      Related to the Industrial Injury? Yes     Explain: Formalin splashed and I      Objective Medical Findings: Corneal burn to the left eye at the 7 o'clock position         None - Discharged                         Stable  No                 Ratable  No        Generally Improved                         Condition Worsened               X   Condition Same  May Have Suffered a Permanent Disability No     Treatment Plan:    Erythromycin ophthalmic ointment 4 times a day for next 5 days, referral to ophthalmology if continuing to have discomfort         No Change in Therapy                  PT/OT Prescribed                      Medication May be Used While Working        Case Management                          PT/OT Discontinued    Consultation    Further Diagnostic Studies:    Prescription(s)               X  Released to FULL DUTY /No Restrictions on (Date):       Certified TOTALLY TEMPORARILY DISABLED (Indicate Dates) From:   To:      Released to RESTRICTED/Modified Duty on (Date): From:   To:    Restrictions Are:         No Sitting    No Standing    No Pulling Other:         No Bending at Waist     No Stooping     No Lifting        No Carrying     No Walking Lifting Restricted to (lbs.):          No Pushing        No Climbing     No Reaching Above Shoulders       Date of Next Visit:    Date of this Exam: 7/9/2025 Physician/Chiropractic Physician Name: No name on file Physician/Chiropractic Physician Signature:  [email or web address]  CEE IRVING M.D.   [place]:  [address] [place] [address], [address] [place], [place] [address] - Telephone [phone] [place]:  [address], [address] [place], [place] [address] - Telephone [phone]    [email or web address]  D-39 (Rev. 10/24)

## 2025-07-09 NOTE — PROGRESS NOTES
"Subjective:   Emili Awad is a 40 y.o. female who presents for Eye Problem (WC, X1 day, liquid fell into eyes at job, eyes feeling sensitive)      HPI:    Visit #1  DOI: 25  C/C: Formalin splashed into eyes and face    Per patient, \"I reached into the cupboard to get a formalin bottle out and it knocked over a bottle of formalin that fell on the counter and some formalin splashed into my eyes/face.\" Injury occurred yesterday, patient states the contact was brief. She reports her eyes were irrigated with the eye wash station for 20 minutes. Her eyes feel sensitive today, especially the left eye. Denies floaters, halos, flashing lights, vision loss. She is experiencing light sensitivity.    ROS As above in HPI    Medications:    Medications Ordered Prior to Encounter[1]     Allergies:   Macrobid [nitrofurantoin]    Problem List:   Problem List[2]     Surgical History:  Past Surgical History:   Procedure Laterality Date    DENTAL SURGERY      wisdom teeth extraction    DILATION AND CURETTAGE      D & C, election  in        Past Social Hx:   Social History[3]       Problem list, medications, and allergies reviewed by myself today in Epic.     Objective:     BP 98/56 (BP Location: Left arm, Patient Position: Sitting, BP Cuff Size: Adult)   Pulse (!) 59   Temp 36.8 °C (98.3 °F) (Temporal)   Resp 12   Ht 1.676 m (5' 6\")   Wt 56.7 kg (125 lb)   SpO2 99%   BMI 20.18 kg/m²     Physical Exam  Vitals and nursing note reviewed.   Constitutional:       General: She is not in acute distress.     Appearance: Normal appearance. She is not ill-appearing or diaphoretic.   HENT:      Head: Normocephalic.   Eyes:      Pupils:      Left eye: Fluorescein uptake present.        Comments: PERRLA, EOM intact. Fluorescein stain was applied to bilateral eyes and eye was examined under [employer] [place]. Left eye appears to have a small corneal defect at the 8 o clock position. Patient is experiencing light sensitivity. " Eyes are otherwise free of FB, discharge. Cornea are not injected.     Neurological:      Mental Status: She is alert.         Assessment/Plan:       Vision Screening    Right eye Left eye Both eyes   Without correction 20/50 20/50 20/25   With correction           Diagnosis and associated orders:   1. Chemical burn of eye  - Referral to Ophthalmology  - erythromycin 5 MG/GM Ointment; Apply 1 Application to right eye 4 times a day for 10 days.  Dispense: 3.5 g; Refill: 0    2. Light sensitivity  - Referral to Ophthalmology  - erythromycin 5 MG/GM Ointment; Apply 1 Application to right eye 4 times a day for 10 days.  Dispense: 3.5 g; Refill: 0    3. Work related injury  - Referral to Ophthalmology  - erythromycin 5 MG/GM Ointment; Apply 1 Application to right eye 4 times a day for 10 days.  Dispense: 3.5 g; Refill: 0    Other orders  - calcium polycarbophil (FIBERCON) 625 MG Tab; Take 625 mg by mouth every day.  - magnesium oxide (MAG-OX) 400 MG Tab tablet; Take 400 mg by mouth every day.        Comments/MDM:     D39 and C4 completed today. Patient appears to have small corneal defect at the 8 o'clock position of the left eye and has light sensitivity. Urgent referral to Ophthalmology ordered and this provider attempted to schedule same day appointment with Ophthalmology, and was told the MD on call will only see patients who are seen in the ER. Will send patient to the ER at this time. Discussed options with patient and she is agreeable to evaluation in the ER. Transfer center notified by phone. Follow up with [facility] after discharge from ER. Will make her TTD for the next 24 hours for ophthalmology evaluation.      Return to clinic or go to ED if symptoms worsen or persist. Indications for ED discussed at length. Patient/Parent/Guardian voices understanding. Follow-up with your primary care provider in 3-5 days. Red flag symptoms discussed. All side effects of medication discussed including allergic response, GI  upset, tendon injury, rash, sedation etc.    Please note that this dictation was created using voice recognition software. I have made a reasonable attempt to correct obvious errors, but I expect that there are errors of grammar and possibly content that I did not discover before finalizing the note.    This note was electronically signed by AMBROSE Segundo             [1]   Current Outpatient Medications on File Prior to Visit   Medication Sig Dispense Refill    calcium polycarbophil (FIBERCON) 625 MG Tab Take 625 mg by mouth every day.      magnesium oxide (MAG-OX) 400 MG Tab tablet Take 400 mg by mouth every day.      Multiple Vitamins-Minerals (MULTIVITAMIN PO) Take  by mouth.       No current facility-administered medications on file prior to visit.   [2]   Patient Active Problem List  Diagnosis    BMI less than 19,adult    Irregular menses    Secondary amenorrhea    Premature ventricular complex    Vitamin D deficiency    Macrocytosis    Positive serological test result    Fatigue   [3]   Social History  Tobacco Use    Smoking status: Never    Smokeless tobacco: Never   Vaping Use    Vaping status: Never Used   Substance Use Topics    Alcohol use: Yes     Alcohol/week: 0.0 oz     Comment: occasional, 3x week    Drug use: No

## 2025-07-09 NOTE — ED NOTES
Written and verbal instructions provided to pt. Pt instructed to follow up with ophthalmology and  medications from pharmacy. Pt instructed to return to emergency department for new or worsening symptoms. Pt verbalized understanding of discharge instructions. Pt ambulatory upon discharge with all belongings.

## 2025-07-09 NOTE — DISCHARGE INSTRUCTIONS
Use the erythromycin ophthalmic ointment to the left eye 4 times a day for the next 5 days.  Your eye should heal within the next 2 to 3 days.  If you continue to have discomfort follow-up with ophthalmology.

## 2025-07-12 NOTE — Clinical Note
REFERRAL APPROVAL NOTICE         Sent on July 11, 2025                   Emili Awad  [address]  [place] [address]                   Dear Ms. Awad,    After a careful review of the medical information and benefit coverage, [facility] has processed your referral. See below for additional details.    If applicable, you must be actively enrolled with your insurance for coverage of the authorized service. If you have any questions regarding your coverage, please contact your insurance directly.    REFERRAL INFORMATION   Referral #:  [id number]  Referred-To Provider    Referred-By Provider:  Ophthalmology    OLIVIA Dominguez, [facility]      [address]  [address]  [place] [address] [phone] [address]  [address]  [place] [id number] [phone]    Referral Start Date:  07/09/2025  Referral End Date:   07/09/2026             SCHEDULING  If you do not already have an appointment, please call [phone] to make an appointment.     MORE INFORMATION  If you do not already have a Mov[employer] account, sign up at: [email or web address]  You can access your medical information, make appointments, see lab results, billing information, and more.  If you have questions regarding this referral, please contact  the [facility] Referrals department at:             [phone]. Monday - Friday 8:00AM - 5:00PM.     Sincerely,    [facility]

## 2025-07-14 ENCOUNTER — OCCUPATIONAL MEDICINE (OUTPATIENT)
Dept: URGENT CARE | Facility: CLINIC | Age: 40
End: 2025-07-14
Payer: COMMERCIAL

## 2025-07-14 VITALS
HEIGHT: 66 IN | TEMPERATURE: 98.3 F | OXYGEN SATURATION: 97 % | HEART RATE: 49 BPM | BODY MASS INDEX: 20.59 KG/M2 | WEIGHT: 128.1 LBS | RESPIRATION RATE: 11 BRPM | DIASTOLIC BLOOD PRESSURE: 48 MMHG | SYSTOLIC BLOOD PRESSURE: 88 MMHG

## 2025-07-14 DIAGNOSIS — S05.02XD ABRASION OF LEFT CORNEA, SUBSEQUENT ENCOUNTER: ICD-10-CM

## 2025-07-14 DIAGNOSIS — T26.90XA CHEMICAL BURN OF EYE: Primary | ICD-10-CM

## 2025-07-14 PROCEDURE — 3074F SYST BP LT 130 MM HG: CPT | Performed by: PHYSICIAN ASSISTANT

## 2025-07-14 PROCEDURE — 99213 OFFICE O/P EST LOW 20 MIN: CPT | Performed by: PHYSICIAN ASSISTANT

## 2025-07-14 PROCEDURE — 3078F DIAST BP <80 MM HG: CPT | Performed by: PHYSICIAN ASSISTANT

## 2025-07-14 PROCEDURE — 1125F AMNT PAIN NOTED PAIN PRSNT: CPT | Performed by: PHYSICIAN ASSISTANT

## 2025-07-14 ASSESSMENT — FIBROSIS 4 INDEX: FIB4 SCORE: 1.31

## 2025-07-14 ASSESSMENT — PAIN SCALES - GENERAL: PAINLEVEL_OUTOF10: 3=SLIGHT PAIN

## 2025-07-14 ASSESSMENT — VISUAL ACUITY: OU: 1

## 2025-07-14 NOTE — PROGRESS NOTES
"Subjective     Emili Awad is a 40 y.o. female who presents with Injury ( INJURY FV/ EYES, FACE)            DOI: 07/08/2025  C/C: Formalin splashed into eyes and face     Visit # 1 on 7/9/2025: \"Per patient,'I reached into the cupboard to get a formalin bottle out and it knocked over a bottle of formalin that fell on the counter and some formalin splashed into my eyes/face.' Injury occurred yesterday, patient states the contact was brief. She reports her eyes were irrigated with the eye wash station for 20 minutes. Her eyes feel sensitive today, especially the left eye. Denies floaters, halos, flashing lights, vision loss. She is experiencing light sensitivity.\"     Visit # 2 to , 7/14/2025: After initial urgent care visit on 7/9 she was examined in the emergency department.  They performed an exam and noted a 2 mm L-shaped faint corneal abrasion at the 7 o'clock position.  Intraocular pH was normal.  It was recommended that she apply erythromycin ointment to the left eye 4 times a day for the next 5 days.  Patient also states that she followed up with ophthalmology.  Says she was prescribed some sort of eyedrops which she thinks was just saline but she is not out of that.  States that she still feels some irritation and light sensitivity and has some blurriness when the ointment is in her eye.      ROS    PMH: No pertinent past medical history to this problem  MEDS: Medications were reviewed in Epic  ALLERGIES: Allergies were reviewed in Epic  SOCHX: Works as an Ultrasound Tech at [employer]   FH: No pertinent family history to this problem        Objective     BP (!) 88/48 (BP Location: Left arm, Patient Position: Sitting, BP Cuff Size: Adult)   Pulse (!) 49   Temp 36.8 °C (98.3 °F) (Temporal)   Resp (!) 11   Ht 1.676 m (5' 6\")   Wt 58.1 kg (128 lb 1.6 oz)   SpO2 97%   BMI 20.68 kg/m²      Physical Exam  Constitutional:       General: She is not in acute distress.     Appearance: She is not " diaphoretic.   HENT:      Head: Normocephalic and atraumatic.      Right Ear: External ear normal.      Left Ear: External ear normal.   Eyes:      General: Lids are normal. Vision grossly intact.         Right eye: No discharge.         Left eye: No discharge.      Extraocular Movements: Extraocular movements intact.      Conjunctiva/sclera: Conjunctivae normal.      Right eye: Right conjunctiva is not injected.      Left eye: Left conjunctiva is not injected.      Pupils: Pupils are equal, round, and reactive to light.      Left eye: No corneal abrasion or fluorescein uptake.   Pulmonary:      Effort: Pulmonary effort is normal. No respiratory distress.   Musculoskeletal:      Cervical back: Normal range of motion.   Skin:     Findings: No rash.   Neurological:      Mental Status: She is alert and oriented to person, place, and time.   Psychiatric:         Mood and Affect: Mood and affect normal.         Cognition and Memory: Memory normal.         Judgment: Judgment normal.         Assessment & Plan     1. Chemical burn of eye (Primary)    2. Abrasion of left cornea, subsequent encounter    It has been 5 days since initial visit.  Eye exam was normal today the patient is still endorsing some irritation, light sensitivity, blurry vision when the ointment is in place. She can discontinue the antibiotic ointment at this time.  If she has some slight irritation she can use over-the-counter saline eyedrops or apply cool compress to the area.  We will see her back in 1 week.  Hopefully her subjective symptoms have improved by then.  Full duty for now.

## 2025-07-14 NOTE — LETTER
PHYSICIAN’S AND CHIROPRACTIC PHYSICIAN'S   PROGRESS REPORT   CERTIFICATION OF DISABILITY Claim Number:     Social Security Number:    Patient’s Name: Emili Awad Date of Injury: 7/8/2025   Employer: [employer] Name of O (if applicable):      Patient’s Job Description/Occupation: Mammographer/ Ultrasound Tech       Previous Injuries/Diseases/Surgeries Contributing to the Condition:  No      Diagnosis: (T26.90XA) Chemical burn of eye  (primary encounter diagnosis)  (S05.02XD) Abrasion of left cornea, subsequent encounter      Related to the Industrial Injury? Yes     Explain: Happened at work      Objective Medical Findings: Eyes:      General: Lids are normal. Vision grossly intact.         Right eye: No discharge.         Left eye: No discharge.      Extraocular Movements: Extraocular movements intact.      Conjunctiva/sclera: Conjunctivae normal.      Right eye: Right conjunctiva is not injected.      Left eye: Left conjunctiva is not injected.      Pupils: Pupils are equal, round, and reactive to light.      Left eye: No corneal abrasion or fluorescein uptake.          None - Discharged                         Stable  Yes                 Ratable  Yes     X   Generally Improved                         Condition Worsened                  Condition Same  May Have Suffered a Permanent Disability No     Treatment Plan:    It has been 5 days.  Eye exam was normal today the patient is still endorsing some irritation, light sensitivity, blurry vision when the ointment is in place. She can discontinue the antibiotic ointment at this time.  If she has some slight irritation she can use over-the-counter saline eyedrops or apply cool compress to the area.  We will see her back in 1 week.  Hopefully her subjective symptoms have improved by then.  Full duty for now.         No Change in Therapy                  PT/OT Prescribed                      Medication May be Used While Working        Case Management                           PT/OT Discontinued    Consultation    Further Diagnostic Studies:    Prescription(s)               X  Released to FULL DUTY /No Restrictions on (Date):  7/14/2025    Certified TOTALLY TEMPORARILY DISABLED (Indicate Dates) From:   To:      Released to RESTRICTED/Modified Duty on (Date): From:   To:    Restrictions Are:         No Sitting    No Standing    No Pulling Other:         No Bending at Waist     No Stooping     No Lifting        No Carrying     No Walking Lifting Restricted to (lbs.):          No Pushing        No Climbing     No Reaching Above Shoulders       Date of Next Visit:  7/21/2025 Date of this Exam: 7/14/2025 Physician/Chiropractic Physician Name: Lela Burrows P.A.-C. Physician/Chiropractic Physician Signature:  Humberto Weber DO MPH     [place]:  [address] [place] [address], [address] [place], [place] [address] - Telephone [phone] [place]:  [address] [place] [address], [address] [place], [place] [address] - Telephone [phone]    [email or web address]  D-39 (Rev. 10/24)

## 2025-07-21 ENCOUNTER — OCCUPATIONAL MEDICINE (OUTPATIENT)
Dept: URGENT CARE | Facility: CLINIC | Age: 40
End: 2025-07-21
Payer: COMMERCIAL

## 2025-07-21 VITALS
BODY MASS INDEX: 25.05 KG/M2 | OXYGEN SATURATION: 98 % | RESPIRATION RATE: 19 BRPM | DIASTOLIC BLOOD PRESSURE: 74 MMHG | HEART RATE: 56 BPM | WEIGHT: 127.6 LBS | SYSTOLIC BLOOD PRESSURE: 102 MMHG | TEMPERATURE: 98.8 F | HEIGHT: 60 IN

## 2025-07-21 DIAGNOSIS — T26.90XA CHEMICAL BURN OF EYE: Primary | ICD-10-CM

## 2025-07-21 DIAGNOSIS — H53.9 VISION CHANGES: ICD-10-CM

## 2025-07-21 PROCEDURE — 99213 OFFICE O/P EST LOW 20 MIN: CPT | Performed by: NURSE PRACTITIONER

## 2025-07-21 PROCEDURE — 3074F SYST BP LT 130 MM HG: CPT | Performed by: NURSE PRACTITIONER

## 2025-07-21 PROCEDURE — 3078F DIAST BP <80 MM HG: CPT | Performed by: NURSE PRACTITIONER

## 2025-07-21 ASSESSMENT — FIBROSIS 4 INDEX: FIB4 SCORE: 1.31

## 2025-07-21 NOTE — PROGRESS NOTES
Chief Complaint   Patient presents with    Follow-Up     Workers comp both eyes the vision is not the same.          History of Present Illness  The patient is a 40-year-old female presenting to the clinic for her third workmen's compensation visit. She had a chemical abrasion to her eye, was seen in clinic, referred to the ER, and evaluated by ophthalmology outpatient. Ophthalmology reported a small abrasion, and she did not need to follow up. She is here for her third follow-up with continued concerns about vision changes.    She experienced a chemical injury to both eyes, which resulted in an abrasion in her left eye. This was confirmed during an ER visit and subsequent evaluation by an ophthalmologist. The ophthalmologist did not deem further follow-up necessary. However, she sought another consultation on 07/17/2025, where it was determined that the abrasion had healed. She was diagnosed with dry eyes and prescribed FreshKote, which she can purchase over the counter once her current supply runs out. She has discontinued the use of erythromycin. She reports no light sensitivity or pain but continues to experience dryness and changes in vision.    She has not been placed on any work restrictions and feels capable of performing her duties safely. She has a mild prescription for glasses, which she has been using for the past 3 to 4 years, but they do not seem to improve her vision clarity. It has been several years since her last visit to an optometrist. Despite this, she reports that her vision has not fully returned to its pre-accident state, with some difficulty in reading due to slight blurriness. She was advised to consult an optometrist but did not receive a referral.    Social History:  Occupations: Works in [employer]       ROS:  As otherwise stated in HPI    Medical/SX/ Social History:  Reviewed per chart    Pertinent Medications:    Medications Ordered Prior to Encounter[1]     Allergies:    Macrobid  [nitrofurantoin]     Problem list, medications, and allergies reviewed by myself today in Epic     Physical Exam:    Vitals:    07/21/25 0904   BP: 102/74   Pulse: (!) 56   Resp: 19   Temp: 37.1 °C (98.8 °F)   SpO2: 98%             Physical Exam  Constitutional:       Appearance: Normal appearance.   HENT:      Head: Normocephalic and atraumatic.      Mouth/Throat:      Lips: Pink.      Mouth: Mucous membranes are moist.   Eyes:      General: Lids are normal. Visual field deficit present. No allergic shiner or scleral icterus.     Extraocular Movements: Extraocular movements intact.      Conjunctiva/sclera: Conjunctivae normal.      Left eye: Left conjunctiva is not injected. No chemosis, exudate or hemorrhage.     Pupils: Pupils are equal, round, and reactive to light.   Neurological:      Mental Status: She is alert.        Medical Decision making and plan :  I personally reviewed prior external notes and test results pertinent to today's visit. Pt is clinically stable at today's acute urgent care visit.  Patient appears nontoxic with no acute distress noted. Appropriate for outpatient care at this time.    Pleasant 40 y.o. female presented clinic with:     Assessment & Plan  1. Chemical abrasion to the eye.  Continued vision changes and dry eyes following a chemical abrasion. Ophthalmology confirmed the abrasion has healed but noted dry eyes.  Ophthalmology reported vision is at baseline according to patient's eyeglasses prescription.  Visual acuity: right eye 20/50, left eye 20/40, binocular 20/30. Visual acuity without glasses.  Referral to optometry recommended by ophthalmology. Referral to occupational medicine or Dr. Tia Steel at [facility] to facilitate further referrals. Advised to use over-the-counter artificial tears as needed for dryness. If condition worsens, experiences pain, or new symptoms arise, return immediately. In case of acute deterioration, an ER visit is recommended.    Follow-up: A  follow-up visit is scheduled for 07/30/2025.      Shared decision-making was utilized with patient for treatment plan. Medication discussed included indication for use and the potential benefits and side effects. Education was provided regarding the aforementioned assessments.  Differential Diagnosis, natural history, and supportive care discussed. All of the patient's questions were answered to their satisfaction at the time of discharge. Patient was encouraged to monitor symptoms closely. Those signs and symptoms which would warrant concern and mandate seeking a higher level of service through the emergency department discussed at length.  Patient stated agreement and understanding of this plan of care.    Disposition:  Home in stable condition     Voice Recognition Disclaimer:  Portions of this document were created using voice recognition software and simfy technology provided by [facility].  Patient was informed of [email or web address] technology being used.  The software does have a chance of producing errors of grammar and possibly content. I have made every reasonable attempt to correct obvious errors, but there could be errors of grammar and possibly content that I did not discover before finalizing the documentation.    Magali Melendez, OWEN.P.R.N.        [1]   Current Outpatient Medications on File Prior to Visit   Medication Sig Dispense Refill    magnesium oxide (MAG-OX) 400 MG Tab tablet Take 400 mg by mouth every day.      Multiple Vitamins-Minerals (MULTIVITAMIN PO) Take  by mouth.      calcium polycarbophil (FIBERCON) 625 MG Tab Take 625 mg by mouth every day. (Patient not taking: Reported on 7/21/2025)       No current facility-administered medications on file prior to visit.

## 2025-07-21 NOTE — LETTER
"PHYSICIAN’S AND CHIROPRACTIC PHYSICIAN'S   PROGRESS REPORT   CERTIFICATION OF DISABILITY Claim Number:     Social Security Number:    Patient’s Name: Emili Awad Date of Injury: 7/8/2025   Employer:   [employer] Name of O (if applicable):      Patient’s Job Description/Occupation: Mammographer/ Ultrasound Tech       Previous Injuries/Diseases/Surgeries Contributing to the Condition:  None. Wore glasses prior, \"light prescription\"       Diagnosis: (T26.90XA) Chemical burn of eye  (primary encounter diagnosis)  (H53.9) Vision changes      Related to the Industrial Injury? Yes     Explain: Happened at work       Objective Medical Findings: Ulcer Resolved per opthalmology and last visit. No injection or drainage. Visual acuity without glasses OS: 20/40 OD: 20/50 OU: 20/30.          None - Discharged                         Stable  No                 Ratable  No        Generally Improved                         Condition Worsened               X   Condition Same  May Have Suffered a Permanent Disability No     Treatment Plan:    Transfer of care to [facility]. Referral to optometry as recommended by ophthalmology. .          No Change in Therapy                  PT/OT Prescribed                      Medication May be Used While Working        Case Management                          PT/OT Discontinued  X  Consultation    Further Diagnostic Studies:    Prescription(s) Optometry              X  Released to FULL DUTY /No Restrictions on (Date):  7/21/2025    Certified TOTALLY TEMPORARILY DISABLED (Indicate Dates) From:   To:      Released to RESTRICTED/Modified Duty on (Date): From:   To:    Restrictions Are:  Temporary      No Sitting    No Standing    No Pulling Other:         No Bending at Waist     No Stooping     No Lifting        No Carrying     No Walking Lifting Restricted to (lbs.):          No Pushing        No Climbing     No Reaching Above Shoulders       Date of Next Visit:  7/30/2025 Date of this " Exam: 7/21/2025 Physician/Chiropractic Physician Name: OLIVIA Foote Physician/Chiropractic Physician Signature:  Humberto Weber DO MPH     [place]:  [facility]  [facility], [address] [place], [place] [address] - Telephone [phone] [place]:  [address] [place] [address], [address] [place], [place] [address] - Telephone [phone]    [email or web address]  D-39 (Rev. 10/24)

## 2025-07-23 NOTE — Clinical Note
REFERRAL APPROVAL NOTICE         Sent on July 23, 2025                   Emili Awad  [address]  [place] [address]                   Dear Ms. Awad,    After a careful review of the medical information and benefit coverage, [facility] has processed your referral. See below for additional details.    If applicable, you must be actively enrolled with your insurance for coverage of the authorized service. If you have any questions regarding your coverage, please contact your insurance directly.    REFERRAL INFORMATION   Referral #:  [id number]  Referred-To Provider    Referred-By Provider:  Optometry    OLIVIA Foote   [facility]      [address] [facility] [address]  [place] [address]  [phone] [address]  [place] [address]  [phone]    Referral Start Date:  07/21/2025  Referral End Date:   07/21/2026             SCHEDULING  If you do not already have an appointment, please call [phone] to make an appointment.     MORE INFORMATION  If you do not already have a D[employer] account, sign up at: [email or web address]  You can access your medical information, make appointments, see lab results, billing information, and more.  If you have questions regarding this referral, please contact  the [facility] Referrals department at:             [phone]. Monday - Friday 8:00AM - 5:00PM.     Sincerely,    [facility]     No

## 2025-08-05 ENCOUNTER — APPOINTMENT (OUTPATIENT)
Dept: OCCUPATIONAL MEDICINE | Facility: CLINIC | Age: 40
End: 2025-08-05
Payer: COMMERCIAL

## 2025-08-05 ENCOUNTER — TELEPHONE (OUTPATIENT)
Dept: OCCUPATIONAL MEDICINE | Facility: CLINIC | Age: 40
End: 2025-08-05

## 2025-08-06 ENCOUNTER — OCCUPATIONAL MEDICINE (OUTPATIENT)
Dept: OCCUPATIONAL MEDICINE | Facility: CLINIC | Age: 40
End: 2025-08-06
Payer: COMMERCIAL

## 2025-08-06 VITALS
DIASTOLIC BLOOD PRESSURE: 74 MMHG | HEIGHT: 66 IN | TEMPERATURE: 97.4 F | WEIGHT: 125 LBS | BODY MASS INDEX: 20.09 KG/M2 | HEART RATE: 59 BPM | SYSTOLIC BLOOD PRESSURE: 118 MMHG | RESPIRATION RATE: 14 BRPM | OXYGEN SATURATION: 94 %

## 2025-08-06 DIAGNOSIS — H53.9 VISION CHANGES: ICD-10-CM

## 2025-08-06 DIAGNOSIS — T26.90XA CHEMICAL BURN OF EYE: Primary | ICD-10-CM

## 2025-08-06 PROCEDURE — 99214 OFFICE O/P EST MOD 30 MIN: CPT | Performed by: NURSE PRACTITIONER

## 2025-08-06 ASSESSMENT — FIBROSIS 4 INDEX: FIB4 SCORE: 1.31

## 2025-08-26 ENCOUNTER — TELEPHONE (OUTPATIENT)
Dept: OCCUPATIONAL MEDICINE | Facility: CLINIC | Age: 40
End: 2025-08-26
Payer: COMMERCIAL

## 2025-08-27 ENCOUNTER — OCCUPATIONAL MEDICINE (OUTPATIENT)
Dept: OCCUPATIONAL MEDICINE | Facility: CLINIC | Age: 40
End: 2025-08-27
Payer: COMMERCIAL

## 2025-08-27 VITALS
RESPIRATION RATE: 16 BRPM | DIASTOLIC BLOOD PRESSURE: 84 MMHG | BODY MASS INDEX: 20.09 KG/M2 | OXYGEN SATURATION: 100 % | SYSTOLIC BLOOD PRESSURE: 118 MMHG | WEIGHT: 125 LBS | TEMPERATURE: 98.6 F | HEIGHT: 66 IN | HEART RATE: 50 BPM

## 2025-08-27 DIAGNOSIS — H53.9 VISION CHANGES: ICD-10-CM

## 2025-08-27 DIAGNOSIS — T26.90XA CHEMICAL BURN OF EYE: Primary | ICD-10-CM

## 2025-08-27 ASSESSMENT — ENCOUNTER SYMPTOMS
DOUBLE VISION: 0
BLURRED VISION: 1
EYE DISCHARGE: 0
EYE PAIN: 0
PHOTOPHOBIA: 0
NEUROLOGICAL NEGATIVE: 1
CARDIOVASCULAR NEGATIVE: 1
MUSCULOSKELETAL NEGATIVE: 1
EYE REDNESS: 0
CHILLS: 0
RESPIRATORY NEGATIVE: 1
FEVER: 0
PSYCHIATRIC NEGATIVE: 1

## 2025-08-27 ASSESSMENT — FIBROSIS 4 INDEX: FIB4 SCORE: 1.31
